# Patient Record
Sex: FEMALE | Race: WHITE | NOT HISPANIC OR LATINO | URBAN - METROPOLITAN AREA
[De-identification: names, ages, dates, MRNs, and addresses within clinical notes are randomized per-mention and may not be internally consistent; named-entity substitution may affect disease eponyms.]

---

## 2018-01-29 PROBLEM — Z00.00 ENCOUNTER FOR PREVENTIVE HEALTH EXAMINATION: Status: ACTIVE | Noted: 2018-01-29

## 2019-09-27 ENCOUNTER — INPATIENT (INPATIENT)
Facility: HOSPITAL | Age: 69
LOS: 3 days | Discharge: HOME | DRG: 71 | End: 2019-10-01
Attending: PSYCHIATRY & NEUROLOGY | Admitting: PSYCHIATRY & NEUROLOGY
Payer: MEDICARE

## 2019-09-27 VITALS
HEART RATE: 71 BPM | SYSTOLIC BLOOD PRESSURE: 126 MMHG | RESPIRATION RATE: 19 BRPM | DIASTOLIC BLOOD PRESSURE: 78 MMHG | OXYGEN SATURATION: 96 %

## 2019-09-27 DIAGNOSIS — I63.9 CEREBRAL INFARCTION, UNSPECIFIED: ICD-10-CM

## 2019-09-27 LAB
ALBUMIN SERPL ELPH-MCNC: 3.8 G/DL — SIGNIFICANT CHANGE UP (ref 3.3–5)
ALP SERPL-CCNC: 41 U/L — SIGNIFICANT CHANGE UP (ref 40–120)
ALT FLD-CCNC: 16 U/L — SIGNIFICANT CHANGE UP (ref 10–45)
ANION GAP SERPL CALC-SCNC: 13 MMOL/L — SIGNIFICANT CHANGE UP (ref 5–17)
APTT BLD: 29.5 SEC — SIGNIFICANT CHANGE UP (ref 27.5–36.3)
AST SERPL-CCNC: 19 U/L — SIGNIFICANT CHANGE UP (ref 10–40)
BASOPHILS # BLD AUTO: 0 K/UL — SIGNIFICANT CHANGE UP (ref 0–0.2)
BASOPHILS NFR BLD AUTO: 0.2 % — SIGNIFICANT CHANGE UP (ref 0–2)
BILIRUB SERPL-MCNC: 0.4 MG/DL — SIGNIFICANT CHANGE UP (ref 0.2–1.2)
BUN SERPL-MCNC: 20 MG/DL — SIGNIFICANT CHANGE UP (ref 7–23)
CALCIUM SERPL-MCNC: 10.5 MG/DL — SIGNIFICANT CHANGE UP (ref 8.4–10.5)
CHLORIDE SERPL-SCNC: 102 MMOL/L — SIGNIFICANT CHANGE UP (ref 96–108)
CO2 SERPL-SCNC: 23 MMOL/L — SIGNIFICANT CHANGE UP (ref 22–31)
CREAT SERPL-MCNC: 0.74 MG/DL — SIGNIFICANT CHANGE UP (ref 0.5–1.3)
EOSINOPHIL # BLD AUTO: 0.3 K/UL — SIGNIFICANT CHANGE UP (ref 0–0.5)
EOSINOPHIL NFR BLD AUTO: 3.6 % — SIGNIFICANT CHANGE UP (ref 0–6)
GLUCOSE SERPL-MCNC: 120 MG/DL — HIGH (ref 70–99)
HCT VFR BLD CALC: 41.4 % — SIGNIFICANT CHANGE UP (ref 34.5–45)
HGB BLD-MCNC: 13.3 G/DL — SIGNIFICANT CHANGE UP (ref 11.5–15.5)
INR BLD: 1.03 RATIO — SIGNIFICANT CHANGE UP (ref 0.88–1.16)
LYMPHOCYTES # BLD AUTO: 2.2 K/UL — SIGNIFICANT CHANGE UP (ref 1–3.3)
LYMPHOCYTES # BLD AUTO: 28.3 % — SIGNIFICANT CHANGE UP (ref 13–44)
MCHC RBC-ENTMCNC: 32.2 GM/DL — SIGNIFICANT CHANGE UP (ref 32–36)
MCHC RBC-ENTMCNC: 32.9 PG — SIGNIFICANT CHANGE UP (ref 27–34)
MCV RBC AUTO: 102 FL — HIGH (ref 80–100)
MONOCYTES # BLD AUTO: 1.1 K/UL — HIGH (ref 0–0.9)
MONOCYTES NFR BLD AUTO: 14.8 % — HIGH (ref 2–14)
NEUTROPHILS # BLD AUTO: 4 K/UL — SIGNIFICANT CHANGE UP (ref 1.8–7.4)
NEUTROPHILS NFR BLD AUTO: 53 % — SIGNIFICANT CHANGE UP (ref 43–77)
PLATELET # BLD AUTO: 225 K/UL — SIGNIFICANT CHANGE UP (ref 150–400)
POTASSIUM SERPL-MCNC: 4.7 MMOL/L — SIGNIFICANT CHANGE UP (ref 3.5–5.3)
POTASSIUM SERPL-SCNC: 4.7 MMOL/L — SIGNIFICANT CHANGE UP (ref 3.5–5.3)
PROT SERPL-MCNC: 6.9 G/DL — SIGNIFICANT CHANGE UP (ref 6–8.3)
PROTHROM AB SERPL-ACNC: 11.8 SEC — SIGNIFICANT CHANGE UP (ref 10–12.9)
RBC # BLD: 4.05 M/UL — SIGNIFICANT CHANGE UP (ref 3.8–5.2)
RBC # FLD: 11.9 % — SIGNIFICANT CHANGE UP (ref 10.3–14.5)
SODIUM SERPL-SCNC: 138 MMOL/L — SIGNIFICANT CHANGE UP (ref 135–145)
WBC # BLD: 7.6 K/UL — SIGNIFICANT CHANGE UP (ref 3.8–10.5)
WBC # FLD AUTO: 7.6 K/UL — SIGNIFICANT CHANGE UP (ref 3.8–10.5)

## 2019-09-27 PROCEDURE — 70498 CT ANGIOGRAPHY NECK: CPT | Mod: 26

## 2019-09-27 PROCEDURE — 99291 CRITICAL CARE FIRST HOUR: CPT

## 2019-09-27 PROCEDURE — 70496 CT ANGIOGRAPHY HEAD: CPT | Mod: 26

## 2019-09-27 PROCEDURE — 93010 ELECTROCARDIOGRAM REPORT: CPT

## 2019-09-27 RX ORDER — ZOLPIDEM TARTRATE 10 MG/1
5 TABLET ORAL AT BEDTIME
Refills: 0 | Status: DISCONTINUED | OUTPATIENT
Start: 2019-09-27 | End: 2019-10-01

## 2019-09-27 RX ORDER — ASPIRIN/CALCIUM CARB/MAGNESIUM 324 MG
300 TABLET ORAL DAILY
Refills: 0 | Status: DISCONTINUED | OUTPATIENT
Start: 2019-09-27 | End: 2019-09-27

## 2019-09-27 RX ORDER — DEXTROSE 50 % IN WATER 50 %
15 SYRINGE (ML) INTRAVENOUS ONCE
Refills: 0 | Status: DISCONTINUED | OUTPATIENT
Start: 2019-09-27 | End: 2019-10-01

## 2019-09-27 RX ORDER — RISPERIDONE 4 MG/1
0.25 TABLET ORAL ONCE
Refills: 0 | Status: COMPLETED | OUTPATIENT
Start: 2019-09-27 | End: 2019-09-27

## 2019-09-27 RX ORDER — FLUTICASONE PROPIONATE 50 MCG
1 SPRAY, SUSPENSION NASAL ONCE
Refills: 0 | Status: DISCONTINUED | OUTPATIENT
Start: 2019-09-27 | End: 2019-09-27

## 2019-09-27 RX ORDER — ATORVASTATIN CALCIUM 80 MG/1
80 TABLET, FILM COATED ORAL AT BEDTIME
Refills: 0 | Status: DISCONTINUED | OUTPATIENT
Start: 2019-09-27 | End: 2019-10-01

## 2019-09-27 RX ORDER — SIMVASTATIN 20 MG/1
40 TABLET, FILM COATED ORAL AT BEDTIME
Refills: 0 | Status: DISCONTINUED | OUTPATIENT
Start: 2019-09-27 | End: 2019-09-30

## 2019-09-27 RX ORDER — ENOXAPARIN SODIUM 100 MG/ML
40 INJECTION SUBCUTANEOUS ONCE
Refills: 0 | Status: COMPLETED | OUTPATIENT
Start: 2019-09-27 | End: 2019-09-27

## 2019-09-27 RX ORDER — CLOPIDOGREL BISULFATE 75 MG/1
75 TABLET, FILM COATED ORAL ONCE
Refills: 0 | Status: COMPLETED | OUTPATIENT
Start: 2019-09-27 | End: 2019-09-27

## 2019-09-27 RX ORDER — DOCUSATE SODIUM 100 MG
100 CAPSULE ORAL DAILY
Refills: 0 | Status: DISCONTINUED | OUTPATIENT
Start: 2019-09-27 | End: 2019-10-01

## 2019-09-27 RX ORDER — TRAZODONE HCL 50 MG
2 TABLET ORAL
Qty: 0 | Refills: 0 | DISCHARGE

## 2019-09-27 RX ORDER — ASPIRIN/CALCIUM CARB/MAGNESIUM 324 MG
81 TABLET ORAL DAILY
Refills: 0 | Status: DISCONTINUED | OUTPATIENT
Start: 2019-09-27 | End: 2019-09-27

## 2019-09-27 RX ORDER — SENNA PLUS 8.6 MG/1
1 TABLET ORAL
Qty: 0 | Refills: 0 | DISCHARGE

## 2019-09-27 RX ORDER — ASPIRIN/CALCIUM CARB/MAGNESIUM 324 MG
81 TABLET ORAL DAILY
Refills: 0 | Status: DISCONTINUED | OUTPATIENT
Start: 2019-09-27 | End: 2019-10-01

## 2019-09-27 RX ORDER — TRAZODONE HCL 50 MG
100 TABLET ORAL ONCE
Refills: 0 | Status: COMPLETED | OUTPATIENT
Start: 2019-09-27 | End: 2019-09-27

## 2019-09-27 RX ORDER — GLUCAGON INJECTION, SOLUTION 0.5 MG/.1ML
1 INJECTION, SOLUTION SUBCUTANEOUS ONCE
Refills: 0 | Status: DISCONTINUED | OUTPATIENT
Start: 2019-09-27 | End: 2019-10-01

## 2019-09-27 RX ORDER — DEXTROSE 50 % IN WATER 50 %
12.5 SYRINGE (ML) INTRAVENOUS ONCE
Refills: 0 | Status: DISCONTINUED | OUTPATIENT
Start: 2019-09-27 | End: 2019-10-01

## 2019-09-27 RX ORDER — FLUTICASONE PROPIONATE 50 MCG
1 SPRAY, SUSPENSION NASAL ONCE
Refills: 0 | Status: COMPLETED | OUTPATIENT
Start: 2019-09-27 | End: 2019-09-27

## 2019-09-27 RX ORDER — SENNA PLUS 8.6 MG/1
1 TABLET ORAL ONCE
Refills: 0 | Status: COMPLETED | OUTPATIENT
Start: 2019-09-27 | End: 2019-09-27

## 2019-09-27 RX ORDER — DIVALPROEX SODIUM 500 MG/1
125 TABLET, DELAYED RELEASE ORAL ONCE
Refills: 0 | Status: COMPLETED | OUTPATIENT
Start: 2019-09-27 | End: 2019-09-27

## 2019-09-27 RX ORDER — DOCUSATE SODIUM 100 MG
100 CAPSULE ORAL DAILY
Refills: 0 | Status: DISCONTINUED | OUTPATIENT
Start: 2019-09-27 | End: 2019-09-27

## 2019-09-27 RX ORDER — SODIUM CHLORIDE 9 MG/ML
1000 INJECTION, SOLUTION INTRAVENOUS
Refills: 0 | Status: DISCONTINUED | OUTPATIENT
Start: 2019-09-27 | End: 2019-10-01

## 2019-09-27 RX ADMIN — Medication 0.5 MILLIGRAM(S): at 22:32

## 2019-09-27 RX ADMIN — CLOPIDOGREL BISULFATE 75 MILLIGRAM(S): 75 TABLET, FILM COATED ORAL at 22:33

## 2019-09-27 RX ADMIN — DIVALPROEX SODIUM 125 MILLIGRAM(S): 500 TABLET, DELAYED RELEASE ORAL at 22:37

## 2019-09-27 RX ADMIN — ATORVASTATIN CALCIUM 80 MILLIGRAM(S): 80 TABLET, FILM COATED ORAL at 22:47

## 2019-09-27 RX ADMIN — RISPERIDONE 0.25 MILLIGRAM(S): 4 TABLET ORAL at 22:37

## 2019-09-27 RX ADMIN — Medication 100 MILLIGRAM(S): at 22:37

## 2019-09-27 RX ADMIN — SIMVASTATIN 40 MILLIGRAM(S): 20 TABLET, FILM COATED ORAL at 22:33

## 2019-09-27 RX ADMIN — Medication 81 MILLIGRAM(S): at 22:47

## 2019-09-27 RX ADMIN — ZOLPIDEM TARTRATE 5 MILLIGRAM(S): 10 TABLET ORAL at 22:34

## 2019-09-27 RX ADMIN — ENOXAPARIN SODIUM 40 MILLIGRAM(S): 100 INJECTION SUBCUTANEOUS at 22:34

## 2019-09-27 NOTE — ED ADULT TRIAGE NOTE - CHIEF COMPLAINT QUOTE
rt sided weakness, slurred speech last seen normal from 1230pm. Pt transferred from [facility] rt sided weakness, slurred speech last seen normal @ 1230pm. Pt transferred from [facility]

## 2019-09-27 NOTE — ED ADULT NURSE NOTE - CHPI ED NUR SYMPTOMS NEG
no vomiting/no loss of consciousness/no nausea/no numbness/no blurred vision/no dizziness/no fever/no confusion/no change in level of consciousness

## 2019-09-27 NOTE — ED ADULT NURSE NOTE - NSIMPLEMENTINTERV_GEN_ALL_ED
Implemented All Fall Risk Interventions:  [place] to call system. Call bell, personal items and telephone within reach. Instruct patient to call for assistance. Room bathroom lighting operational. Non-slip footwear when patient is off stretcher. Physically safe environment: no spills, clutter or unnecessary equipment. Stretcher in lowest position, wheels locked, appropriate side rails in place. Provide visual cue, wrist band, yellow gown, etc. Monitor gait and stability. Monitor for mental status changes and reorient to person, place, and time. Review medications for side effects contributing to fall risk. Reinforce activity limits and safety measures with patient and family.

## 2019-09-27 NOTE — ED PROVIDER NOTE - PROGRESS NOTE DETAILS
Elizabeth Goldberger PGY-3: neuro called that disks from OSH only w non-con CTH so request another CTA head and neck

## 2019-09-27 NOTE — H&P ADULT - HISTORY OF PRESENT ILLNESS
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 hemiparesis, HTN, HLD, Alcoholism c/b Korsakoff's Dementia, presents with complaints of R. hemiplegia. Patient was noted to have hemiplegia of RUE RLE today at 12pm, LKN. At that time, CTH showed NAD, CTA shows L. M1 stenosis unchanged from prior exam. Patient transfered to ICU before being transfered to [facility] for higher level of stroke management.   At [facility] ED, patient presents with R. nasolabial flattening only, NIHSS 1. VSS, labs grossly WNL.   Patient currently denies fevers, chills, ns, cp, sob, abd pain, n/v/d/c, or changes to weight or senses.

## 2019-09-27 NOTE — ED ADULT NURSE NOTE - CHIEF COMPLAINT QUOTE
rt sided weakness, slurred speech last seen normal @ 1230pm. Pt transferred from [facility]

## 2019-09-27 NOTE — ED PROVIDER NOTE - CRITICAL CARE PROVIDED
conducted a detailed discussion of DNR status/interpretation of diagnostic studies/consultation with other physicians/documentation/additional history taking/direct patient care (not related to procedure)

## 2019-09-27 NOTE — ED PROVIDER NOTE - OBJECTIVE STATEMENT
69f w hx HTN, HLD, ETOH abuse, Korsakoff's, L MCA stroke on 9/17 treated w tPA w improved sx and only slight residual r-sided weakness, xfer'd today from [facility] for recurrence of severe right-sided weakness and slurred speech. Pt was admitted to [facility], in MICU; no intervention given for these new sx. Decision was made to xfer to [facility] for higher level neuro. She is currently AOx2 (does not know year). Feels her speech is still abnormal but significantly improved from earlier in addition to weakness on right. She denies h/a, visual changes or other complaints.

## 2019-09-27 NOTE — ED PROVIDER NOTE - CLINICAL SUMMARY MEDICAL DECISION MAKING FREE TEXT BOX
69f w hx HTN, HLD, ETOH abuse, Korsakoff's, L MCA stroke on 9/17 treated w tPA w improved sx and only slight residual r-sided weakness, xfer'd today from [facility] for recurrence of severe right-sided weakness and slurred speech. Pt appears well NAD. Exam w slight RUE weakness and slurred speech, otherwise nonfocal. Will recheck labs, d/w neuro, likely admit

## 2019-09-27 NOTE — ED PROVIDER NOTE - ATTENDING CONTRIBUTION TO CARE
69 y F hx of HTN, HLD, alcohol dependence, Korsakoff's, CVA on 9/17 transfer for stroke like sx earlier today. Per paperwork from outside hospital, pt presented with stroke like sx on 9/17 which included R hemiplegia, received tPA and was DC to rehab 9/23 w 4/5 weakness throughout, no other deficit. Then today around 1230 noted to have flaccid paralysis RUE and RLE and R facial droop. Was called as code stroke and taken to CT where her sx began to resolve. Pt tnr to ICU and decsion to TNR to [facility] for more advanced stroke care. Code stroke activate don pt arrival. Pt with no discernable focal weakness. A&O. Speech clear and fluent. Mild loss of R nasolabial fold. NIH1. Pt arrived with disc w CT/CTA hH/N from today which showed no change from prior imaging, does have M1/2 stenosis. Neuro states not tPA candidate at present, no need for further imaging currently. Continue to monitor for neuro changes. BP monitoring, Dispo TBD by neuro.

## 2019-09-27 NOTE — ED PROVIDER NOTE - NEUROLOGICAL MOTOR DORSIFLEXION RIGHT
r  strength 4/5 relative to 5/5 on left, full strength LEs r  strength 4/5 relative to 5/5 on left, however no pronoar drift BL  full strength LEs

## 2019-09-27 NOTE — ED ADULT NURSE NOTE - OBJECTIVE STATEMENT
69F comes to ED transfer from [facility] for stroke eval. Patient had CVA (left MCA) on 9/17 for which she received TPA. She went to rehab and has residual right sided weakness. Today at rehab, she had acute episode of worsening right sided weakness with aphasia. She did not get TPA at [facility]. Patient is a&ox2 - disoriented to time. States "I felt that my weakness got worse". Code stroke was called for patient but neurology MD states he does not need further imaging, and patient is now outside window for TPA. Patient alert, in no distress. Denies SOB/chest pain/N/V/D/dizziness/abdominal pain/fever/chills/numbness/tingling. On exam, right facial droop noted, no drift in any extremities, all 4 extremities strong, abdomen soft, sensation intact all 4 extremities. To remain on cardiac monitor. Bedrails up for safety .

## 2019-09-27 NOTE — H&P ADULT - ATTENDING COMMENTS
I agree with the above stated with  the following modifications:  Patient seen and examined today no focal deficit on examination.  CT of the head done at [facility] reviewed by me and no major area of hypodensity identified other that bilateral basal ganglia and left insular chronic ischemic changes. Changes of small vessel disease along with age related atrophy evident. Head and neck angiography pertinent for multiple intracranial atherosclerosis. Left M2 superior division area of stenosis noticed.    Impression:  Symptomatic intracranial atherosclerosis in the form of hypoperfusion vs artery to artery embolism.     Plan:  1. Unclear if patient on ASA and Plavix?? Needs ASA and Plavix for at least 3 months then discontinue Plavix and keep ASA. If she becomes again symptomatic considerations for intracranial stent can be discuss.  2. PRU upon 48 hrs of Plavix therapy  3. Lipid panel LDL 82 continue home dose statin  4. HgA1C 6.3 keep tight glycemic control  5. Pharmacological DVT prophylaxis  6. Permissive HTN systolic up to 220 then after 48-72 hrs gradual normotension with cautious observation for recurrence of symptoms  7. No need for further imaging   8. Dysphagia passed  9. PT/OT evaluation  10. Anticipated prompt discharge

## 2019-09-27 NOTE — CONSULT NOTE ADULT - SUBJECTIVE AND OBJECTIVE BOX
Neurology  Consult Note  09-27-19    Name:  LEANNA BRAMBILA; 69y (1950)    Reason for Admission:     Chief Complaint:  R. 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 hemiparesis, HTN, HLD, Alcoholism c/b Korsakoff's Dementia, presents with complaints of R. hemiplegia. Patient was noted to have hemiplegia of RUE RLE today at 12pm, LKN. At that time, CTH showed NAD, CTA shows L. M1 stenosis unchanged from prior exam. Patient transfered to ICU before being transfered to [facility] for higher level of stroke management.   At [facility] ED, patient presents with R. nasolabial flattening only, NIHSS 1. VSS, labs grossly WNL.   Patient currently denies fevers, chills, ns, cp, sob, abd pain, n/v/d/c, or changes to weight or senses.     Review of Systems:  As states in HPI.    PMHx:   Alcohol dependence  Hyperlipemia  HTN (hypertension)  Depression    PSuHx:   Depression  Hyperlipemia  HTN (hypertension)    Medications:  MEDICATIONS  (STANDING):    MEDICATIONS  (PRN):    Vitals:  T(C): --  HR: 71 (09-27-19 @ 16:41) (71 - 71)  BP: 126/78 (09-27-19 @ 16:41) (126/78 - 126/78)  RR: 19 (09-27-19 @ 16:41) (19 - 19)  SpO2: 96% (09-27-19 @ 16:41) (96% - 96%)    Labs:                        13.3   7.6   )-----------( 225      ( 27 Sep 2019 17:01 )             41.4     09-27    138  |  102  |  20  ----------------------------<  120<H>  4.7   |  23  |  0.74    Ca    10.5      27 Sep 2019 17:01    TPro  6.9  /  Alb  3.8  /  TBili  0.4  /  DBili  x   /  AST  19  /  ALT  16  /  AlkPhos  41  09-27    CAPILLARY BLOOD GLUCOSE      POCT Blood Glucose.: 111 mg/dL (27 Sep 2019 16:40)    LIVER FUNCTIONS - ( 27 Sep 2019 17:01 )  Alb: 3.8 g/dL / Pro: 6.9 g/dL / ALK PHOS: 41 U/L / ALT: 16 U/L / AST: 19 U/L / GGT: x             PT/INR - ( 27 Sep 2019 17:01 )   PT: 11.8 sec;   INR: 1.03 ratio         PTT - ( 27 Sep 2019 17:01 )  PTT:29.5 sec    PHYSICAL EXAMINATION:  General: Well-developed, well nourished, in no acute distress.  Eyes: Conjunctiva and sclera clear.  Neurologic:  - Mental Status:  Alert, awake, oriented to person, place, and time; Speech is fluent with intact naming, repetition, and comprehension;   - Cranial Nerves II-XII:    II:  Visual fields are full to confrontation; Pupils are equal, round, and reactive to light;  III, IV, VI:  Extraocular movements are intact without nystagmus.  V:  Facial sensation is intact in the V1-V3 distribution bilaterally.  VII: R UMN lower facial palsy w/o correction on smile  VIII:  Hearing is intact to finger rub.  IX, X:  Uvula is midline and soft palate rises symmetrically  XI:  Head turning and shoulder shrug are intact.  XII:  Tongue protudes in the midline.  - Motor:  Strength is 5/5 throughout.  Bilateral tremulousness. There is no pronator drift.  Normal muscle bulk and tone throughout.  - Reflexes:  2+ and symmetric at the biceps, triceps, brachioradialis, knees, and ankles.  Plantar responses flexor.  - Sensory:  Intact throughout to vibration, and joint-position, light touch, pin prick.  - Coordination:  Finger-nose-finger and heel-knee-shin intact without dysmetria.  Rapid alternating hand movements intact.    Radiology:  CTA Pending

## 2019-09-27 NOTE — H&P ADULT - NSHPPHYSICALEXAM_GEN_ALL_CORE
PHYSICAL EXAMINATION:  General: Well-developed, well nourished, in no acute distress.  Eyes: Conjunctiva and sclera clear.  Neurologic:  - Mental Status:  Alert, awake, oriented to person, place, and time; Speech is fluent with intact naming, repetition, and comprehension;   - Cranial Nerves II-XII:    II:  Visual fields are full to confrontation; Pupils are equal, round, and reactive to light;  III, IV, VI:  Extraocular movements are intact without nystagmus.  V:  Facial sensation is intact in the V1-V3 distribution bilaterally.  VII: R UMN lower facial palsy w/o correction on smile  VIII:  Hearing is intact to finger rub.  IX, X:  Uvula is midline and soft palate rises symmetrically  XI:  Head turning and shoulder shrug are intact.  XII:  Tongue protrudes in the midline.  - Motor:  Strength is 5/5 throughout.  Bilateral tremulousness. There is no pronator drift.  Normal muscle bulk and tone throughout.  - Reflexes:  2+ and symmetric at the biceps, triceps, brachioradialis, knees, and ankles.  Plantar responses flexor.  - Sensory:  Intact throughout to vibration, and joint-position, light touch, pin prick.  - Coordination:  Finger-nose-finger and heel-knee-shin intact without dysmetria.  Rapid alternating hand movements intact.

## 2019-09-28 LAB
CHOLEST SERPL-MCNC: 164 MG/DL — SIGNIFICANT CHANGE UP (ref 10–199)
ESTIMATED AVERAGE GLUCOSE: 134 MG/DL — HIGH (ref 68–114)
GLUCOSE BLDC GLUCOMTR-MCNC: 116 MG/DL — HIGH (ref 70–99)
GLUCOSE BLDC GLUCOMTR-MCNC: 131 MG/DL — HIGH (ref 70–99)
GLUCOSE BLDC GLUCOMTR-MCNC: 133 MG/DL — HIGH (ref 70–99)
GLUCOSE BLDC GLUCOMTR-MCNC: 141 MG/DL — HIGH (ref 70–99)
HBA1C BLD-MCNC: 6.3 % — HIGH (ref 4–5.6)
HBA1C BLD-MCNC: 6.3 % — HIGH (ref 4–5.6)
HDLC SERPL-MCNC: 58 MG/DL — SIGNIFICANT CHANGE UP
LIPID PNL WITH DIRECT LDL SERPL: 82 MG/DL — SIGNIFICANT CHANGE UP
TOTAL CHOLESTEROL/HDL RATIO MEASUREMENT: 2.8 RATIO — LOW (ref 3.3–7.1)
TRIGL SERPL-MCNC: 119 MG/DL — SIGNIFICANT CHANGE UP (ref 10–149)

## 2019-09-28 PROCEDURE — 99223 1ST HOSP IP/OBS HIGH 75: CPT

## 2019-09-28 RX ORDER — CLOPIDOGREL BISULFATE 75 MG/1
75 TABLET, FILM COATED ORAL DAILY
Refills: 0 | Status: DISCONTINUED | OUTPATIENT
Start: 2019-09-28 | End: 2019-10-01

## 2019-09-28 RX ORDER — ENOXAPARIN SODIUM 100 MG/ML
40 INJECTION SUBCUTANEOUS DAILY
Refills: 0 | Status: DISCONTINUED | OUTPATIENT
Start: 2019-09-28 | End: 2019-10-01

## 2019-09-28 RX ORDER — INSULIN LISPRO 100/ML
VIAL (ML) SUBCUTANEOUS
Refills: 0 | Status: DISCONTINUED | OUTPATIENT
Start: 2019-09-28 | End: 2019-10-01

## 2019-09-28 RX ORDER — ACETAMINOPHEN 500 MG
650 TABLET ORAL EVERY 6 HOURS
Refills: 0 | Status: DISCONTINUED | OUTPATIENT
Start: 2019-09-28 | End: 2019-10-01

## 2019-09-28 RX ORDER — INSULIN LISPRO 100/ML
VIAL (ML) SUBCUTANEOUS AT BEDTIME
Refills: 0 | Status: DISCONTINUED | OUTPATIENT
Start: 2019-09-28 | End: 2019-10-01

## 2019-09-28 RX ADMIN — ZOLPIDEM TARTRATE 5 MILLIGRAM(S): 10 TABLET ORAL at 21:02

## 2019-09-28 RX ADMIN — SIMVASTATIN 40 MILLIGRAM(S): 20 TABLET, FILM COATED ORAL at 21:02

## 2019-09-28 RX ADMIN — Medication 650 MILLIGRAM(S): at 17:14

## 2019-09-28 RX ADMIN — CLOPIDOGREL BISULFATE 75 MILLIGRAM(S): 75 TABLET, FILM COATED ORAL at 17:17

## 2019-09-28 RX ADMIN — ENOXAPARIN SODIUM 40 MILLIGRAM(S): 100 INJECTION SUBCUTANEOUS at 12:59

## 2019-09-28 RX ADMIN — Medication 650 MILLIGRAM(S): at 16:45

## 2019-09-28 RX ADMIN — ATORVASTATIN CALCIUM 80 MILLIGRAM(S): 80 TABLET, FILM COATED ORAL at 21:02

## 2019-09-28 RX ADMIN — Medication 81 MILLIGRAM(S): at 13:00

## 2019-09-28 NOTE — OCCUPATIONAL THERAPY INITIAL EVALUATION ADULT - PERTINENT HX OF CURRENT PROBLEM, REHAB EVAL
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 Pt noted to have hemiplegia of RUE/RLE today at 12pm, LKN. At that time, CTH showed NAD, CTA shows L M1 stenosis unchanged from prior exam. Pt transferred to ICU before being transferred to [facility] for higher level of stroke mgmt. (cont below)

## 2019-09-28 NOTE — PHYSICAL THERAPY INITIAL EVALUATION ADULT - PRECAUTIONS/LIMITATIONS, REHAB EVAL
CTH 9/27: No acute ICH, mass effect, or shift of the midline structures. Small age indeterminate infarct within the left corona radiata extending to the L internal capsule. Additional chronic R frontal lobe infarct and chronic microvascular disease. CTA BRAIN 9/27: Multifocal areas of moderate to severe stenosis involving the R PCA. Additional areas of stenoses involving the R CLIARE. Mild stenosis involving the origin of the L superior terminal branch of the L MCA at the MCA bifurcation. CTA NECK 9/27: No focal stenosis, major vessel occlusion, aneurysm or dissection of the bilateral vertebral, common carotid, & ICAs. Ascending aortic aneurysm measuring up to 4.7cm./fall precautions

## 2019-09-28 NOTE — PHYSICAL THERAPY INITIAL EVALUATION ADULT - PERTINENT HX OF CURRENT PROBLEM, REHAB EVAL
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 presents with c/o R hemiplegia. Pt noted to have hemiplegia of RUE/RLE today at 12pm, LKN, CTH: NAD, CTA shows L M1 stenosis unchanged from prior exam. Pt transfered to ICU & then to [facility]. At [facility] ED, NIHSS 1. +Recrudescence due to remote L MCA territory infarct. CTA demonstrates intracranial stenosis, but no intervention recs.

## 2019-09-28 NOTE — SPEECH LANGUAGE PATHOLOGY EVALUATION - SLP PERTINENT HISTORY OF CURRENT PROBLEM
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 hemiparesis, HTN, HLD, Alcoholism c/b Korsakoff's Dementia, presents with complaints of R. hemiplegia. Patient was noted to have hemiplegia of RUE RLE today at 12pm, LKN. At that time, CTH showed NAD, CTA shows L. M1 stenosis unchanged from prior exam. Patient transfered to ICU before being transfered to [facility] for higher level of stroke management.

## 2019-09-28 NOTE — SPEECH LANGUAGE PATHOLOGY EVALUATION - SLP DIAGNOSIS
Pt presents with evidence of a mild-mod dysarthria.  Speech marked by hoarse, raspy vocal quality, imprecise articulatory precision with reduced respiratory support with an average of 4-6 words per breath group. Overall fair speech intelligibility. Pt became somewhat resistant to evaluation half-way through and stated "I just don't want to do this anymore" accompanied by turning over in bed. Suspect higher level cognitive deficits present marked by reduced insight into deficits. Ongoing assessment suggested to evaluate cognitive skills.

## 2019-09-28 NOTE — SPEECH LANGUAGE PATHOLOGY EVALUATION - COMMENTS
Reason for Admission: L. MCA infarct recrudescence.  CTA Head 9/27: CTA BRAIN:Multifocal areas of moderate to severe stenosis involving the right   posterior cerebral artery. Additional areas of stenoses involving the right anterior cerebral   artery. Please see discussion the body of the report.Mild stenosis involving the origin of the left superior terminal branch of the left MCA at the MCA bifurcation.  CTA NECK:No focal stenosis, major vessel occlusion, aneurysm or dissection of the bilateral vertebral, common carotid, and internal carotid arteries.Ascending aortic aneurysm measuring up to 4.7 cm. t presents with evidence of a mild-mod dysarthria.  Speech marked by hoarse, raspy vocal quality, imprecise articulatory precision with reduced respiratory support with an average of 4-6 words per breath group. Overall fair speech intelligibility. OT for cognition Pt refused to participate Unable to fully assess cognition due to refusal to participate. Pt noted with poor pragmatic skills, evidenced by turning over in bed and stating "we're done - I don't want to do this" while SLP was talking. Pt refused

## 2019-09-28 NOTE — SPEECH LANGUAGE PATHOLOGY EVALUATION - SLP GENERAL OBSERVATIONS
Pt encountered bedside, AA&Ox4. Pt able to follow simple commands and answer yes/no questions. Pt reported "I just had a great lunch of tuna fish, cheese and a roll"

## 2019-09-28 NOTE — OCCUPATIONAL THERAPY INITIAL EVALUATION ADULT - PLANNED THERAPY INTERVENTIONS, OT EVAL
balance training/cognitive, visual perceptual/neuromuscular re-education/ADL retraining/transfer training/fine motor coordination training/parent/caregiver training.../strengthening Full Thickness Lip Wedge Repair (Flap) Text: Given the location of the defect and the proximity to free margins a full thickness wedge repair was deemed most appropriate.  Using a sterile surgical marker, the appropriate repair was drawn incorporating the defect and placing the expected incisions perpendicular to the vermilion border.  The vermilion border was also meticulously outlined to ensure appropriate reapproximation during the repair.  The area thus outlined was incised through and through with a #15 scalpel blade.  The muscularis and dermis were reaproximated with deep sutures following hemostasis. Care was taken to realign the vermilion border before proceeding with the superficial closure.  Once the vermilion was realigned the superfical and mucosal closure was finished.

## 2019-09-28 NOTE — OCCUPATIONAL THERAPY INITIAL EVALUATION ADULT - LEVEL OF INDEPENDENCE, REHAB EVAL
Pt being discharged at this time. Discharge instructions reviewed with mom. Reviewed to call MD regarding pts status. Reviewed to monitor for signs and symptoms of infection as well as intake and output. Reviewed how to care for gastrostomy as well as administering medications and feedings. Mom comfortable with gastrostomy and demonstrated understanding. Reviewed medication list, dose, route and frequency. Follow-up appointment scheduled. All questions answered, mom verbalized understanding. Awaiting carepoint delivery at this time. Safety maintained.    NT-patient received in chair

## 2019-09-28 NOTE — PHYSICAL THERAPY INITIAL EVALUATION ADULT - ADDITIONAL COMMENTS
lives in assisted living facility, no stairs, pt ambulated independent prior to last hospital admission (~1wk ago) and at the rehab center was using wheelchair. patient right hand dominant

## 2019-09-28 NOTE — OCCUPATIONAL THERAPY INITIAL EVALUATION ADULT - ADL RETRAINING, OT EVAL
Goals: Pt will perform LB dressing with supervision within 2 weeks. Pt will perform toileting with supervision within 2 weeks.

## 2019-09-28 NOTE — PHYSICAL THERAPY INITIAL EVALUATION ADULT - IMPAIRMENTS CONTRIBUTING TO GAIT DEVIATIONS, PT EVAL
decreased strength/required assistance with maneuvering rolling walker especially during turns/impaired balance

## 2019-09-28 NOTE — SPEECH LANGUAGE PATHOLOGY EVALUATION - YES/NO QUESTIONS: COMPLEX
no/Patient requested to terminate paragraph length comprehension questions and stated "I just really don't want to do this"

## 2019-09-28 NOTE — OCCUPATIONAL THERAPY INITIAL EVALUATION ADULT - ADDITIONAL COMMENTS
CTA Head 9/27: CTA BRAIN:Multifocal areas of moderate to severe stenosis involving the right   posterior cerebral artery. Additional areas of stenoses involving the right anterior cerebral   artery. Please see discussion the body of the report.Mild stenosis involving the origin of the left superior terminal branch of the left MCA at the MCA bifurcation.  CTA NECK:No focal stenosis, major vessel occlusion, aneurysm or dissection of the bilateral vertebral, common carotid, and internal carotid arteries.Ascending aortic aneurysm measuring up to 4.7 cm.

## 2019-09-29 LAB
ANION GAP SERPL CALC-SCNC: 12 MMOL/L — SIGNIFICANT CHANGE UP (ref 5–17)
BUN SERPL-MCNC: 14 MG/DL — SIGNIFICANT CHANGE UP (ref 7–23)
CALCIUM SERPL-MCNC: 10 MG/DL — SIGNIFICANT CHANGE UP (ref 8.4–10.5)
CHLORIDE SERPL-SCNC: 103 MMOL/L — SIGNIFICANT CHANGE UP (ref 96–108)
CO2 SERPL-SCNC: 24 MMOL/L — SIGNIFICANT CHANGE UP (ref 22–31)
CREAT SERPL-MCNC: 0.6 MG/DL — SIGNIFICANT CHANGE UP (ref 0.5–1.3)
GLUCOSE BLDC GLUCOMTR-MCNC: 138 MG/DL — HIGH (ref 70–99)
GLUCOSE BLDC GLUCOMTR-MCNC: 144 MG/DL — HIGH (ref 70–99)
GLUCOSE BLDC GLUCOMTR-MCNC: 150 MG/DL — HIGH (ref 70–99)
GLUCOSE BLDC GLUCOMTR-MCNC: 174 MG/DL — HIGH (ref 70–99)
GLUCOSE SERPL-MCNC: 154 MG/DL — HIGH (ref 70–99)
HBA1C BLD-MCNC: 6.3 % — HIGH (ref 4–5.6)
HCT VFR BLD CALC: 42.4 % — SIGNIFICANT CHANGE UP (ref 34.5–45)
HGB BLD-MCNC: 13.8 G/DL — SIGNIFICANT CHANGE UP (ref 11.5–15.5)
MCHC RBC-ENTMCNC: 32.5 GM/DL — SIGNIFICANT CHANGE UP (ref 32–36)
MCHC RBC-ENTMCNC: 32.9 PG — SIGNIFICANT CHANGE UP (ref 27–34)
MCV RBC AUTO: 101.2 FL — HIGH (ref 80–100)
PA ADP PRP-ACNC: 103 PRU — LOW (ref 194–417)
PLATELET # BLD AUTO: 217 K/UL — SIGNIFICANT CHANGE UP (ref 150–400)
POTASSIUM SERPL-MCNC: 4.2 MMOL/L — SIGNIFICANT CHANGE UP (ref 3.5–5.3)
POTASSIUM SERPL-SCNC: 4.2 MMOL/L — SIGNIFICANT CHANGE UP (ref 3.5–5.3)
RBC # BLD: 4.19 M/UL — SIGNIFICANT CHANGE UP (ref 3.8–5.2)
RBC # FLD: 12.5 % — SIGNIFICANT CHANGE UP (ref 10.3–14.5)
SODIUM SERPL-SCNC: 139 MMOL/L — SIGNIFICANT CHANGE UP (ref 135–145)
WBC # BLD: 6.99 K/UL — SIGNIFICANT CHANGE UP (ref 3.8–10.5)
WBC # FLD AUTO: 6.99 K/UL — SIGNIFICANT CHANGE UP (ref 3.8–10.5)

## 2019-09-29 PROCEDURE — 99233 SBSQ HOSP IP/OBS HIGH 50: CPT

## 2019-09-29 RX ORDER — LISINOPRIL 2.5 MG/1
20 TABLET ORAL DAILY
Refills: 0 | Status: DISCONTINUED | OUTPATIENT
Start: 2019-09-29 | End: 2019-09-30

## 2019-09-29 RX ORDER — TRAZODONE HCL 50 MG
100 TABLET ORAL AT BEDTIME
Refills: 0 | Status: DISCONTINUED | OUTPATIENT
Start: 2019-09-29 | End: 2019-10-01

## 2019-09-29 RX ORDER — RISPERIDONE 4 MG/1
0.25 TABLET ORAL DAILY
Refills: 0 | Status: DISCONTINUED | OUTPATIENT
Start: 2019-09-29 | End: 2019-10-01

## 2019-09-29 RX ORDER — DIVALPROEX SODIUM 500 MG/1
500 TABLET, DELAYED RELEASE ORAL
Refills: 0 | Status: DISCONTINUED | OUTPATIENT
Start: 2019-09-29 | End: 2019-10-01

## 2019-09-29 RX ADMIN — ENOXAPARIN SODIUM 40 MILLIGRAM(S): 100 INJECTION SUBCUTANEOUS at 12:49

## 2019-09-29 RX ADMIN — Medication 0.5 MILLIGRAM(S): at 18:40

## 2019-09-29 RX ADMIN — Medication 1: at 12:50

## 2019-09-29 RX ADMIN — LISINOPRIL 20 MILLIGRAM(S): 2.5 TABLET ORAL at 12:49

## 2019-09-29 RX ADMIN — RISPERIDONE 0.25 MILLIGRAM(S): 4 TABLET ORAL at 18:41

## 2019-09-29 RX ADMIN — DIVALPROEX SODIUM 500 MILLIGRAM(S): 500 TABLET, DELAYED RELEASE ORAL at 18:40

## 2019-09-29 RX ADMIN — Medication 100 MILLIGRAM(S): at 21:26

## 2019-09-29 RX ADMIN — Medication 81 MILLIGRAM(S): at 12:49

## 2019-09-29 RX ADMIN — Medication 100 MILLIGRAM(S): at 12:49

## 2019-09-29 RX ADMIN — SIMVASTATIN 40 MILLIGRAM(S): 20 TABLET, FILM COATED ORAL at 21:26

## 2019-09-29 RX ADMIN — ZOLPIDEM TARTRATE 5 MILLIGRAM(S): 10 TABLET ORAL at 21:26

## 2019-09-29 RX ADMIN — CLOPIDOGREL BISULFATE 75 MILLIGRAM(S): 75 TABLET, FILM COATED ORAL at 12:49

## 2019-09-29 RX ADMIN — ATORVASTATIN CALCIUM 80 MILLIGRAM(S): 80 TABLET, FILM COATED ORAL at 21:26

## 2019-09-29 NOTE — PROGRESS NOTE ADULT - SUBJECTIVE AND OBJECTIVE BOX
THE PATIENT WAS SEEN AND EXAMINED BY ME WITH THE HOUSESTAFF AND STROKE TEAM DURING MORNING ROUNDS.   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 hemiparesis, HTN, HLD, Alcoholism c/b Korsakoff's Dementia, presented to the ED with complaints of R. hemiplegia. Patient was noted to have hemiplegia of RUE RLE on the day of admission at 12pm, LKN. At that time, CTH showed NAD, CTA shows L. M1 stenosis unchanged from prior exam. Patient transferred to ICU before being transferred to [facility] for higher level of stroke management. At [facility] ED, patient presents with R. nasolabial flattening only, NIHSS 1. VSS, labs grossly WNL. Patient currently denies fevers, chills, ns, cp, sob, abd pain, n/v/d/c, or changes to weight or senses.    SUBJECTIVE: No events overnight. No new neurologic complaints.     acetaminophen   Tablet .. 650 milliGRAM(s) Oral every 6 hours PRN  aspirin  chewable 81 milliGRAM(s) Oral daily  atorvastatin 80 milliGRAM(s) Oral at bedtime  clopidogrel Tablet 75 milliGRAM(s) Oral daily  dextrose 40% Gel 15 Gram(s) Oral once PRN  dextrose 5%. 1000 milliLiter(s) IV Continuous <Continuous>  dextrose 50% Injectable 12.5 Gram(s) IV Push once  docusate sodium 100 milliGRAM(s) Oral daily  enoxaparin Injectable 40 milliGRAM(s) SubCutaneous daily  glucagon  Injectable 1 milliGRAM(s) IntraMuscular once PRN  insulin lispro (HumaLOG) corrective regimen sliding scale   SubCutaneous three times a day before meals  insulin lispro (HumaLOG) corrective regimen sliding scale   SubCutaneous at bedtime  simvastatin 40 milliGRAM(s) Oral at bedtime  zolpidem 5 milliGRAM(s) Oral at bedtime PRN  zolpidem 5 milliGRAM(s) Oral at bedtime PRN    PHYSICAL EXAM:   Vital Signs Last 24 Hrs  T(C): 36.7 (29 Sep 2019 04:45), Max: 36.7 (28 Sep 2019 08:39)  T(F): 98 (29 Sep 2019 04:45), Max: 98.1 (28 Sep 2019 08:39)  HR: 80 (29 Sep 2019 04:45) (71 - 94)  BP: 160/81 (29 Sep 2019 04:45) (140/72 - 174/78)  RR: 19 (29 Sep 2019 04:45) (19 - 20)  SpO2: 97% (29 Sep 2019 04:45) (94% - 97%)    General: No acute distress  HEENT: EOM intact  Abdomen: Soft, nontender, nondistended   Extremities: No edema    NEUROLOGICAL EXAM:  Mental status: Awake, alert, oriented to person, place, and time, speech is fluent with intact naming and repetition  Cranial Nerves: R UMN lower facial palsy w/o correction on smily, no nystagmus, no dysarthria, tongue midline  Motor exam: Normal tone, strength 5/5 throughout   Sensation: Intact to light touch   Coordination/ Gait: No dysmetria, gait deferred     LABS:                        13.3   7.6   )-----------( 225      ( 27 Sep 2019 17:01 )             41.4    09-29    139  |  103  |  14  ----------------------------<  154<H>  4.2   |  24  |  0.60    Ca    10.0      29 Sep 2019 06:07    TPro  6.9  /  Alb  3.8  /  TBili  0.4  /  DBili  x   /  AST  19  /  ALT  16  /  AlkPhos  41  09-27  PT/INR - ( 27 Sep 2019 17:01 )   PT: 11.8 sec;   INR: 1.03 ratio         PTT - ( 27 Sep 2019 17:01 )  PTT:29.5 sec  Hemoglobin A1C, Whole Blood: 6.3 % (09-28 @ 11:34)  Hemoglobin A1C, Whole Blood: 6.3 % (09-28 @ 11:10)      IMAGING: Reviewed by me.     CT head No Cont (9/27/19): No acute intracranial hemorrhage, mass effect, or shift of the midline structures.2. Small age indeterminate infarct within the left corona radiata extending to the left internal capsule.3. Additional chronic right frontal lobe infarct and chronic microvascular disease.    CTA BRAIN w/ IV Cont (9/27/19):Multifocal areas of moderate to severe stenosis involving the right posterior cerebral artery.2. Additional areas of stenoses involving the right anterior cerebral artery. Please see discussion the body of the report.3. Mild stenosis involving the origin of the left superior terminal branch of the left MCA at the MCA bifurcation.    CTA NECK w/ IV cont (9/27/19):No focal stenosis, major vessel occlusion, aneurysm or dissection of the bilateral vertebral, common carotid, and internal carotid arteries.2. Ascending aortic aneurysm measuring up to 4.7 cm. THE PATIENT WAS SEEN AND EXAMINED BY ME WITH THE HOUSESTAFF AND STROKE TEAM DURING MORNING ROUNDS.   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 hemiparesis, HTN, HLD, Alcoholism c/b Korsakoff's Dementia, presented to the ED with complaints of R. hemiplegia. Patient was noted to have hemiplegia of RUE RLE on the day of admission at 12pm, LKN. At that time, CTH showed NAD, CTA shows L. M1 stenosis unchanged from prior exam. Patient transferred to ICU before being transferred to [facility] for higher level of stroke management. At [facility] ED, patient presents with R. nasolabial flattening only, NIHSS 1. VSS, labs grossly WNL. Patient currently denies fevers, chills, ns, cp, sob, abd pain, n/v/d/c, or changes to weight or senses.    SUBJECTIVE: No events overnight. No new neurologic complaints.     acetaminophen   Tablet .. 650 milliGRAM(s) Oral every 6 hours PRN  aspirin  chewable 81 milliGRAM(s) Oral daily  atorvastatin 80 milliGRAM(s) Oral at bedtime  clopidogrel Tablet 75 milliGRAM(s) Oral daily  dextrose 40% Gel 15 Gram(s) Oral once PRN  dextrose 5%. 1000 milliLiter(s) IV Continuous <Continuous>  dextrose 50% Injectable 12.5 Gram(s) IV Push once  docusate sodium 100 milliGRAM(s) Oral daily  enoxaparin Injectable 40 milliGRAM(s) SubCutaneous daily  glucagon  Injectable 1 milliGRAM(s) IntraMuscular once PRN  insulin lispro (HumaLOG) corrective regimen sliding scale   SubCutaneous three times a day before meals  insulin lispro (HumaLOG) corrective regimen sliding scale   SubCutaneous at bedtime  simvastatin 40 milliGRAM(s) Oral at bedtime  zolpidem 5 milliGRAM(s) Oral at bedtime PRN  zolpidem 5 milliGRAM(s) Oral at bedtime PRN    PHYSICAL EXAM:   Vital Signs Last 24 Hrs  T(C): 36.7 (29 Sep 2019 04:45), Max: 36.7 (28 Sep 2019 08:39)  T(F): 98 (29 Sep 2019 04:45), Max: 98.1 (28 Sep 2019 08:39)  HR: 80 (29 Sep 2019 04:45) (71 - 94)  BP: 160/81 (29 Sep 2019 04:45) (140/72 - 174/78)  RR: 19 (29 Sep 2019 04:45) (19 - 20)  SpO2: 97% (29 Sep 2019 04:45) (94% - 97%)    General: No acute distress  HEENT: EOM intact  Abdomen: Soft, nontender, nondistended   Extremities: No edema    NEUROLOGICAL EXAM:  Mental status: Awake, alert, oriented to person, place, and time, speech is fluent with intact naming and repetition  Cranial Nerves: R UMN lower facial palsy w/o correction on smile, no nystagmus, no dysarthria, tongue midline  Motor exam: Normal tone, strength 5/5 throughout   Sensation: Intact to light touch   Coordination/ Gait: No dysmetria, gait deferred     LABS:                        13.3   7.6   )-----------( 225      ( 27 Sep 2019 17:01 )             41.4    09-29    139  |  103  |  14  ----------------------------<  154<H>  4.2   |  24  |  0.60    Ca    10.0      29 Sep 2019 06:07    TPro  6.9  /  Alb  3.8  /  TBili  0.4  /  DBili  x   /  AST  19  /  ALT  16  /  AlkPhos  41  09-27  PT/INR - ( 27 Sep 2019 17:01 )   PT: 11.8 sec;   INR: 1.03 ratio         PTT - ( 27 Sep 2019 17:01 )  PTT:29.5 sec  Hemoglobin A1C, Whole Blood: 6.3 % (09-28 @ 11:34)  Hemoglobin A1C, Whole Blood: 6.3 % (09-28 @ 11:10)      IMAGING: Reviewed by me.     CT head No Cont (9/27/19): No acute intracranial hemorrhage, mass effect, or shift of the midline structures.2. Small age indeterminate infarct within the left corona radiata extending to the left internal capsule.3. Additional chronic right frontal lobe infarct and chronic microvascular disease.    CTA BRAIN w/ IV Cont (9/27/19):Multifocal areas of moderate to severe stenosis involving the right posterior cerebral artery.2. Additional areas of stenoses involving the right anterior cerebral artery. Please see discussion the body of the report.3. Mild stenosis involving the origin of the left superior terminal branch of the left MCA at the MCA bifurcation.    CTA NECK w/ IV cont (9/27/19):No focal stenosis, major vessel occlusion, aneurysm or dissection of the bilateral vertebral, common carotid, and internal carotid arteries.2. Ascending aortic aneurysm measuring up to 4.7 cm.

## 2019-09-29 NOTE — PROGRESS NOTE ADULT - ASSESSMENT
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 hemiparesis, HTN, HLD, Alcoholism c/b Korsakoff's Dementia, presented to the ED with complaints of R. hemiplegia. Patient was noted to have hemiplegia of RUE RLE on the day of admission at 12pm, LKN. At that time, CTH showed NAD, CTA shows L. M1 stenosis unchanged from prior exam. Patient transferred to ICU before being transferred to [facility] for higher level of stroke management. At [facility] ED, patient presents with R. nasolabial flattening only, NIHSS 1. VSS, labs grossly WNL.     Impression: Symptomatic intracranial atherosclerosis in the form of hypoperfusion vs artery to artery embolism.    NEURO: Continue monitoring for neurologic deterioration, permissive HTN to gradual normotension, titrate statin to LDL goal less than 70, MRI Brain w/o, results as noted above. Physical therapy/OT/ recommend AR.     ANTITHROMBOTIC THERAPY: Aspirin and Plavix for 3 months and then aspirin alone     PULMONARY: Protecting airway, saturating well     CARDIOVASCULAR: check TTE, cardiac monitoring without any recorded events.   SBP goal: Permissive HTN to gradual normotension     GASTROINTESTINAL: dysphagia screen passed, tolerating diet.   Diet: Regular     RENAL: BUN/Cr without acute change  Na Goal: 145-150, avoid rapid fluctuations gradual titration   Fowler: No    HEMATOLOGY: H/H without acute change, Platelets 225, no signs or symptoms of bleeding.   DVT ppx: LMWH    ID: afebrile, no leukocytosis. No signs or symptoms of infection.     OTHER: All questions and concerns addressed to patient at bedside.     DISPOSITION: Acute rehab once stabilized and workup is complete.     CORE MEASURES:   Admission NIHSS: 1  TPA: [] YES [x] NO   LDL/HDL: 82/58  Depression Screen: 0   Statin Therapy: y   Dysphagia Screen: [x] PASS [] FAIL  Smoking [] YES [x] NO   Afib [] YES [x] NO  Stroke Education [x] YES [] NO    Obtain screening lower extremity venous ultrasound in patients who meet 1 or more of the following criteria as patient is high risk for DVT/PE on admission:   [] History of DVT/PE  []Hypercoagulable states (Factor V Leiden, Cancer, OCP, etc. 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 hemiparesis, HTN, HLD, Alcoholism c/b Korsakoff's Dementia, presented to the ED with complaints of R. hemiplegia. Patient was noted to have hemiplegia of RUE RLE on the day of admission at 12pm, LKN. At that time, CTH showed NAD, CTA shows L. M1 stenosis unchanged from prior exam. Patient transferred to ICU before being transferred to [facility] for higher level of stroke management. At [facility] ED, patient presents with R. nasolabial flattening only, NIHSS 1. VSS, labs grossly WNL.     Impression: Symptomatic intracranial atherosclerosis in the form of hypoperfusion vs artery to artery embolism.    NEURO: Neurologically stable since admission. Continue monitoring for neurologic deterioration, permissive HTN to gradual normotension, titrate statin to LDL goal less than 70. Physical therapy/OT/ recommend AR.     ANTITHROMBOTIC THERAPY: Aspirin and Plavix for 3 months and then aspirin alone     PULMONARY: Protecting airway, saturating well     CARDIOVASCULAR: check TTE, cardiac monitoring without any recorded events.   SBP goal: Permissive HTN to gradual normotension     GASTROINTESTINAL: dysphagia screen passed, tolerating diet.   Diet: Regular     RENAL: BUN/Cr without acute change  Na Goal: 145-150, avoid rapid fluctuations gradual titration   Fowler: No    HEMATOLOGY: H/H without acute change, Platelets 217, no signs or symptoms of bleeding.   DVT ppx: LMWH    ID: afebrile, no leukocytosis. No signs or symptoms of infection.     OTHER: All questions and concerns addressed to patient at bedside. Spoke with brotherMadhu over the phone and explained plan including keeping her BP permissive and slowly bringing it down with a goal of 110-140 systolic, all questions answered.     DISPOSITION: Acute rehab once stabilized and workup is complete.     CORE MEASURES:   Admission NIHSS: 1  TPA: [] YES [x] NO   LDL/HDL: 82/58  Depression Screen: 0   Statin Therapy: y   Dysphagia Screen: [x] PASS [] FAIL  Smoking [] YES [x] NO   Afib [] YES [x] NO  Stroke Education [x] YES [] NO    Obtain screening lower extremity venous ultrasound in patients who meet 1 or more of the following criteria as patient is high risk for DVT/PE on admission:   [] History of DVT/PE  []Hypercoagulable states (Factor V Leiden, Cancer, OCP, etc. 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 hemiparesis, HTN, HLD, Alcoholism c/b Korsakoff's Dementia, presented to the ED with complaints of R. hemiplegia. Patient was noted to have hemiplegia of RUE RLE on the day of admission at 12pm, LKN. At that time, CTH showed NAD, CTA shows L. M1 stenosis unchanged from prior exam. Patient transferred to ICU before being transferred to [facility] for higher level of stroke management. At [facility] ED, patient presents with R. nasolabial flattening only, NIHSS 1. VSS, labs grossly WNL.     Impression: Symptomatic intracranial atherosclerosis in the form of hypoperfusion vs artery to artery embolism.    NEURO: Neurologically stable since admission. Continue monitoring for neurologic deterioration, permissive HTN to gradual normotension, titrate statin to LDL goal less than 70. Physical therapy/OT/ recommend AR.     ANTITHROMBOTIC THERAPY: Aspirin and Plavix for 3 months and then aspirin alone;      PULMONARY: Protecting airway, saturating well     CARDIOVASCULAR: check TTE can be done outpatient, cardiac monitoring without any recorded events.   SBP goal: Permissive HTN to gradual normotension     GASTROINTESTINAL: dysphagia screen passed, tolerating diet.   Diet: Regular     RENAL: BUN/Cr without acute change  Na Goal: 145-150, avoid rapid fluctuations gradual titration   Fowler: No    HEMATOLOGY: H/H without acute change, Platelets 217, no signs or symptoms of bleeding.   DVT ppx: LMWH    ID: afebrile, no leukocytosis. No signs or symptoms of infection.     OTHER: All questions and concerns addressed to patient at bedside. Spoke with brotherMadhu over the phone and explained plan including keeping her BP permissive and slowly bringing it down with a goal of 110-140 systolic, all questions answered.     DISPOSITION: Acute rehab once stabilized and workup is complete.     CORE MEASURES:   Admission NIHSS: 1  TPA: [] YES [x] NO   LDL/HDL: 82/58  Depression Screen: 0   Statin Therapy: y   Dysphagia Screen: [x] PASS [] FAIL  Smoking [] YES [x] NO   Afib [] YES [x] NO  Stroke Education [x] YES [] NO    Obtain screening lower extremity venous ultrasound in patients who meet 1 or more of the following criteria as patient is high risk for DVT/PE on admission:   [] History of DVT/PE  []Hypercoagulable states (Factor V Leiden, Cancer, OCP, etc. )  []Prolonged immobility (hemiplegia/hemiparesis/post operative or any other extended immobilization)  [] Transferred from outside facility (Rehab or Long term care)  [] Age </= to 50

## 2019-09-29 NOTE — PROVIDER CONTACT NOTE (OTHER) - SITUATION
Patient refusing intermittent pneumatic compression device
no headache/no confusion/no syncope/no difficulty walking/no generalized seizures/no focal seizures/no loss of consciousness/no hemiparesis/no transient paralysis/no tremors/no loss of sensation

## 2019-09-30 ENCOUNTER — TRANSCRIPTION ENCOUNTER (OUTPATIENT)
Age: 69
End: 2019-09-30

## 2019-09-30 LAB
ANION GAP SERPL CALC-SCNC: 13 MMOL/L — SIGNIFICANT CHANGE UP (ref 5–17)
BUN SERPL-MCNC: 13 MG/DL — SIGNIFICANT CHANGE UP (ref 7–23)
CALCIUM SERPL-MCNC: 9.6 MG/DL — SIGNIFICANT CHANGE UP (ref 8.4–10.5)
CHLORIDE SERPL-SCNC: 105 MMOL/L — SIGNIFICANT CHANGE UP (ref 96–108)
CO2 SERPL-SCNC: 23 MMOL/L — SIGNIFICANT CHANGE UP (ref 22–31)
CREAT SERPL-MCNC: 0.65 MG/DL — SIGNIFICANT CHANGE UP (ref 0.5–1.3)
GLUCOSE BLDC GLUCOMTR-MCNC: 115 MG/DL — HIGH (ref 70–99)
GLUCOSE BLDC GLUCOMTR-MCNC: 133 MG/DL — HIGH (ref 70–99)
GLUCOSE BLDC GLUCOMTR-MCNC: 134 MG/DL — HIGH (ref 70–99)
GLUCOSE BLDC GLUCOMTR-MCNC: 230 MG/DL — HIGH (ref 70–99)
GLUCOSE SERPL-MCNC: 150 MG/DL — HIGH (ref 70–99)
HCT VFR BLD CALC: 42.4 % — SIGNIFICANT CHANGE UP (ref 34.5–45)
HGB BLD-MCNC: 13.9 G/DL — SIGNIFICANT CHANGE UP (ref 11.5–15.5)
MCHC RBC-ENTMCNC: 32.8 GM/DL — SIGNIFICANT CHANGE UP (ref 32–36)
MCHC RBC-ENTMCNC: 33 PG — SIGNIFICANT CHANGE UP (ref 27–34)
MCV RBC AUTO: 100.7 FL — HIGH (ref 80–100)
PLATELET # BLD AUTO: 232 K/UL — SIGNIFICANT CHANGE UP (ref 150–400)
POTASSIUM SERPL-MCNC: 4 MMOL/L — SIGNIFICANT CHANGE UP (ref 3.5–5.3)
POTASSIUM SERPL-SCNC: 4 MMOL/L — SIGNIFICANT CHANGE UP (ref 3.5–5.3)
RBC # BLD: 4.21 M/UL — SIGNIFICANT CHANGE UP (ref 3.8–5.2)
RBC # FLD: 12.7 % — SIGNIFICANT CHANGE UP (ref 10.3–14.5)
SODIUM SERPL-SCNC: 141 MMOL/L — SIGNIFICANT CHANGE UP (ref 135–145)
WBC # BLD: 7.24 K/UL — SIGNIFICANT CHANGE UP (ref 3.8–10.5)
WBC # FLD AUTO: 7.24 K/UL — SIGNIFICANT CHANGE UP (ref 3.8–10.5)

## 2019-09-30 PROCEDURE — 99222 1ST HOSP IP/OBS MODERATE 55: CPT | Mod: GC

## 2019-09-30 PROCEDURE — 99233 SBSQ HOSP IP/OBS HIGH 50: CPT

## 2019-09-30 RX ORDER — LISINOPRIL 2.5 MG/1
40 TABLET ORAL DAILY
Refills: 0 | Status: DISCONTINUED | OUTPATIENT
Start: 2019-10-01 | End: 2019-10-01

## 2019-09-30 RX ADMIN — DIVALPROEX SODIUM 500 MILLIGRAM(S): 500 TABLET, DELAYED RELEASE ORAL at 05:46

## 2019-09-30 RX ADMIN — ENOXAPARIN SODIUM 40 MILLIGRAM(S): 100 INJECTION SUBCUTANEOUS at 12:25

## 2019-09-30 RX ADMIN — Medication 0.5 MILLIGRAM(S): at 12:25

## 2019-09-30 RX ADMIN — Medication 0.5 MILLIGRAM(S): at 23:21

## 2019-09-30 RX ADMIN — ZOLPIDEM TARTRATE 5 MILLIGRAM(S): 10 TABLET ORAL at 21:45

## 2019-09-30 RX ADMIN — DIVALPROEX SODIUM 500 MILLIGRAM(S): 500 TABLET, DELAYED RELEASE ORAL at 17:44

## 2019-09-30 RX ADMIN — Medication 100 MILLIGRAM(S): at 21:50

## 2019-09-30 RX ADMIN — RISPERIDONE 0.25 MILLIGRAM(S): 4 TABLET ORAL at 12:25

## 2019-09-30 RX ADMIN — Medication 0.5 MILLIGRAM(S): at 17:44

## 2019-09-30 RX ADMIN — ATORVASTATIN CALCIUM 80 MILLIGRAM(S): 80 TABLET, FILM COATED ORAL at 21:45

## 2019-09-30 RX ADMIN — CLOPIDOGREL BISULFATE 75 MILLIGRAM(S): 75 TABLET, FILM COATED ORAL at 12:25

## 2019-09-30 RX ADMIN — Medication 0.5 MILLIGRAM(S): at 05:46

## 2019-09-30 RX ADMIN — LISINOPRIL 20 MILLIGRAM(S): 2.5 TABLET ORAL at 05:46

## 2019-09-30 RX ADMIN — Medication 81 MILLIGRAM(S): at 12:25

## 2019-09-30 NOTE — CONSULT NOTE ADULT - SUBJECTIVE AND OBJECTIVE BOX
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 Patient was then transferred to acute inpatient rehab at [facility]. While on rehab patient developed worsening right sided weakness and hypotension. She was transferred to the ICU and then to [facility] for higher level of stroke management. At [facility] ED, patient presented with R. nasolabial flattening only. NIHSS was 1. CT head was negative for any acute intracranial pathology, however did show age indeterminate infarct within the left corona radiata extending to the left internal capsule and chronic right frontal lobe infarct and chronic microvascular disease. CTA brain showed areas of moderate to severe stenosis of the right PCA and right CLAIRE and mild stenosis of the left superior terminal branch of the left MCA at the MCA bifurcation. Patient was admitted to the stroke unit for close monitoring and further management.   Further history obtained from brother at bedside.       REVIEW OF SYSTEMS  Constitutional - No fever, No weight loss, + fatigue  HEENT - No eye pain, No visual disturbances, No difficulty hearing, No tinnitus,   Respiratory - No cough, No wheezing, No shortness of breath  Cardiovascular - No chest pain, No palpitations  Gastrointestinal - No abdominal pain, No nausea, No vomiting, No diarrhea, No constipation  Genitourinary - No dysuria, No frequency, No incontinence  Neurological - No headaches, No memory loss, +weakness, No numbness, + tremors  Skin - No itching, No rashes, No lesions   Musculoskeletal - No joint pain, No joint swelling, No muscle pain  Psychiatric - No depression, No anxiety    PAST MEDICAL & SURGICAL HISTORY  Alcohol dependence  Hyperlipemia  HTN (hypertension)  Depression      SOCIAL HISTORY  Smoking - Denied  EtOH - prior history of etoh abuse quit 10 years ago   Drugs - Denied    PRIOR FUNCTIONAL HISTORY  Ambulation: independent with RW  ADLs: requires assistance with bathing, dressing, meals    Previous Home Equipment: RW    HOME ENVIRONMENT:  Type of Home: assisted living facility  Stairs: 0  Living With: alone   Caregiver's availability: yes    Special Needs or Precautions:  Weight bearing status: WBAT    FAMILY HISTORY   reviewed and non contributory    RECENT LABS/IMAGING  CBC Full  -  ( 30 Sep 2019 08:18 )  WBC Count : 7.24 K/uL  RBC Count : 4.21 M/uL  Hemoglobin : 13.9 g/dL  Hematocrit : 42.4 %  Platelet Count - Automated : 232 K/uL  Mean Cell Volume : 100.7 fl  Mean Cell Hemoglobin : 33.0 pg  Mean Cell Hemoglobin Concentration : 32.8 gm/dL  Auto Neutrophil # : x  Auto Lymphocyte # : x  Auto Monocyte # : x  Auto Eosinophil # : x  Auto Basophil # : x  Auto Neutrophil % : x  Auto Lymphocyte % : x  Auto Monocyte % : x  Auto Eosinophil % : x  Auto Basophil % : x    09-30    141  |  105  |  13  ----------------------------<  150<H>  4.0   |  23  |  0.65    Ca    9.6      30 Sep 2019 06:30      IMAGING  < from: CT Angio Head w/ IV Cont (09.27.19 @ 18:17) >  CT head:  1. No acute intracranial hemorrhage, mass effect, or shift of the midline structures.  2. Small age indeterminate infarct within the left corona radiata extending to the left internal capsule.  3. Additional chronic right frontal lobe infarct and chronic microvascular disease.    CTA BRAIN:  1. Multifocal areas of moderate to severe stenosis involving the right posterior cerebral artery.  2. Additional areas of stenoses involving the right anterior cerebral artery. Please see discussion the body of the report.  3. Mild stenosis involving the origin of the left superior terminal branch of the left MCA at the MCA bifurcation.    CTA NECK:  1. No focal stenosis, major vessel occlusion, aneurysm or dissection of the bilateral vertebral, common carotid, and internal carotid arteries.  2. Ascending aortic aneurysm measuring up to 4.7 cm.      VITALS  T(C): 36.8 (09-30-19 @ 08:14), Max: 36.9 (09-29-19 @ 12:08)  HR: 83 (09-30-19 @ 08:14) (83 - 97)  BP: 191/80 (09-30-19 @ 08:14) (147/83 - 191/80)  RR: 18 (09-30-19 @ 08:14) (18 - 18)  SpO2: 97% (09-30-19 @ 08:14) (94% - 97%)  Wt(kg): --    ALLERGIES  No Known Allergies      MEDICATIONS   acetaminophen   Tablet .. 650 milliGRAM(s) Oral every 6 hours PRN  aspirin  chewable 81 milliGRAM(s) Oral daily  atorvastatin 80 milliGRAM(s) Oral at bedtime  clopidogrel Tablet 75 milliGRAM(s) Oral daily  dextrose 40% Gel 15 Gram(s) Oral once PRN  dextrose 5%. 1000 milliLiter(s) IV Continuous <Continuous>  dextrose 50% Injectable 12.5 Gram(s) IV Push once  diVALproex Sprinkle 500 milliGRAM(s) Oral two times a day  docusate sodium 100 milliGRAM(s) Oral daily  enoxaparin Injectable 40 milliGRAM(s) SubCutaneous daily  glucagon  Injectable 1 milliGRAM(s) IntraMuscular once PRN  insulin lispro (HumaLOG) corrective regimen sliding scale   SubCutaneous three times a day before meals  insulin lispro (HumaLOG) corrective regimen sliding scale   SubCutaneous at bedtime  lisinopril 20 milliGRAM(s) Oral daily  LORazepam     Tablet 0.5 milliGRAM(s) Oral four times a day  risperiDONE   Tablet 0.25 milliGRAM(s) Oral daily  traZODone 100 milliGRAM(s) Oral at bedtime  zolpidem 5 milliGRAM(s) Oral at bedtime PRN  zolpidem 5 milliGRAM(s) Oral at bedtime PRN      ----------------------------------------------------------------------------------------  PHYSICAL EXAM  Constitutional - NAD, Comfortable, sitting in chair at bedside  HEENT - NCAT, EOMI  Neck - Supple, No limited ROM  Chest - CTA bilaterally, No wheezing  Cardiovascular - RRR, S1S2,  Abdomen - BS+, Soft, NT, ND  Extremities - No C/C/E, No calf tenderness   Neurologic Exam -                    Cognitive - AAOx2 (person & place), NAD, follows commands approprietly     Communication - Fluent, mild dysarthria     Cranial Nerves - EOMI, facial sensation intact, no facial droop appreciated, tongue midline, hearing intact, shoulder shrug intact     Motor - No focal deficits                    LEFT    UE - ShAB 5/5, EF 5/5, EE 5/5, WE 5/5,  5/5                    RIGHT UE - ShAB 4+/5, EF 4/5, EE 4/5, WE 4/5,  4/5                    LEFT    LE - HF 3/5 (poor pt. effort),, KE 5/5, DF 5/5, PF 5/5                    RIGHT LE - HF 3/5 (poor pt. effort), KE 5/5, DF 5/5, PF 5/5        Sensory - Intact to LT     Reflexes - DTR Intact and equal     Coordination - FTN fair bilaterally, +tremor      Psychiatric - Mood stable, Affect WNL    CURRENT FUNCTIONAL STATUS  PT 9/28  Bed Mobility: Supine to Sit:     · Level of Murray  minimum assist (75% patients effort)    · Physical Assist/Nonphysical Assist  1 person assist; nonverbal cues (demo/gestures); verbal cues    · Assistive Device  bed rails      Bed Mobility Analysis:     · Impairments Contributing to Impaired Bed Mobility  impaired balance; cognition; decreased strength      Transfer: Sit to Stand:     · Level of [place]  minimum assist (75% patients effort)    · Physical Assist/Nonphysical Assist  1 person assist; nonverbal cues (demo/gestures); verbal cues    · Weight-Bearing Restrictions  full weight-bearing    · Assistive Device  rolling walker      Transfer: Stand to Sit:     · Level of Murray  minimum assist (75% patients effort)    · Physical Assist/Nonphysical Assist  1 person assist; nonverbal cues (demo/gestures); verbal cues    · Weight-Bearing Restrictions  full weight-bearing    · Assistive Device  rolling walker      Sit/Stand Transfer Safety Analysis:     · Transfer Safety Concerns Noted  decreased safety awareness; started to sit when chair was not directly behind pt    · Impairments Contributing to Impaired Transfers  impaired balance; cognition; decreased strength      Gait Skills:     · Level of Murray  minimum assist (75% patients effort)    · Physical Assist/Nonphysical Assist  1 person assist; nonverbal cues (demo/gestures); verbal cues    · Weight-Bearing Restrictions  full weight-bearing    · Assistive Device  rolling walker    · Gait Distance  16ftx2      Gait Analysis:     · Gait Deviations Noted  decreased angelina; decreased step length; veer to L    · Impairments Contributing to Gait Deviations  impaired balance; decreased strength; required assistance with maneuvering rolling walker especially during turns      Balance Skills Assessment:     · Sitting Balance: Static  fair plus     · Sitting Balance: Dynamic  fair balance     · Sit-to-Stand Balance  fair minus  with rolling walker     · Standing Balance: Static  fair balance  with rolling walker     · Standing Balance: Dynamic  fair minus  with rolling walker       Fine Motor Coordination:   {[id number]} Fine Motor Coordination Examination:    Fine Motor Coordination:   · Left Hand, Finger to Nose  normal performance     · Right Hand, Finger to Nose  mild impairment     · Fine Motor Coordination Tests  LUE finger to finger intact, RUE finger to finger mild impairment        OT 9/28  Fine Motor Coordination:   · Left Hand, Finger to Nose  mild impairment     · Right Hand, Finger to Nose  mild impairment     · Left Hand Thumb/Finger Opposition Skills  mild impairment     · Right Hand Thumb/Finger Opposition Skills  mild impairment     · Left Hand, Manipulation of Objects  mild impairment  +tremor noted     · Right Hand, Manipulation of Objects  mild impairment       Upper Body Dressing Training:     · Level of Murray  minimum assist (75% patients effort)    · Physical Assist/Nonphysical Assist  1 person assist      Lower Body Dressing Training:     · Level of Murray  moderate assist (50% patients effort)    · Physical Assist/Nonphysical Assist  1 person assist; nonverbal cues (demo/gestures); verbal cues      Grooming Training:     · Level of Murray  supervision    · Physical Assist/Nonphysical Assist  supervision; set-up required      Eating/Self-Feeding Training:     · Level of Murray  supervision    · Physical Assist/Nonphysical Assist  set-up required; supervision      ----------------------------------------------------------------------------------------  ASSESSMENT/PLAN    68 yo Female with functional deficits after acute CVA and symptomatic intracranial atherosclerosis in the form of hypoperfusion vs artery to artery embolism.    CVA with right sided weakness - as per neurology management, plavix, aspirin  HLD - lipitor  Insomnia - ambien, trazodone  Dementia/Mood disorder - ativan, risperdal, depakote  Pain - Tylenol  DVT PPX - SCDs, Lovenox  Rehab -   Continue bedside therapy as well as OOB throughout the day with mobilization by staff to maintain cardiopulmonary function and prevention of secondary complications related to debility.   PT- ROM, Bed Mobility, transfers, amb w AD, GCEs  OT- ADLs, energy conservation  SLP- Dysphagia eval and tx    Recommend ACUTE inpatient rehabilitation for the functional deficits consisting of 3 hours of therapy/day & 24 hour RN/daily PMR physician for comorbid medical management. Patient will be able to tolerate 3 hours a day.    Will continue to follow for ongoing rehab needs and recommendations. . Functional progress will determine ongoing rehab dispo recommendations, which may change. 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 Patient was then transferred to acute inpatient rehab at [facility]. While on rehab patient developed worsening right sided weakness and hypotension. She was transferred to the ICU and then to [facility] for higher level of stroke management. At [facility] ED, patient presented with R. nasolabial flattening only. NIHSS was 1. CT head was negative for any acute intracranial pathology, however did show age indeterminate infarct within the left corona radiata extending to the left internal capsule and chronic right frontal lobe infarct and chronic microvascular disease. CTA brain showed areas of moderate to severe stenosis of the right PCA and right CLAIRE and mild stenosis of the left superior terminal branch of the left MCA at the MCA bifurcation. Patient was admitted to the stroke unit for close monitoring and further management.   Further history obtained from brother at bedside.       REVIEW OF SYSTEMS  Constitutional - No fever, No weight loss, + fatigue  HEENT - No eye pain, No visual disturbances, No difficulty hearing, No tinnitus,   Respiratory - No cough, No wheezing, No shortness of breath  Cardiovascular - No chest pain, No palpitations  Gastrointestinal - No abdominal pain, No nausea, No vomiting, No diarrhea, No constipation  Genitourinary - No dysuria, No frequency, No incontinence  Neurological - No headaches, No memory loss, +weakness, No numbness, + tremors  Skin - No itching, No rashes, No lesions   Musculoskeletal - No joint pain, No joint swelling, No muscle pain  Psychiatric - No depression, No anxiety    PAST MEDICAL & SURGICAL HISTORY  Alcohol dependence  Hyperlipemia  HTN (hypertension)  Depression      SOCIAL HISTORY  Smoking - Denied  EtOH - prior history of etoh abuse quit 10 years ago   Drugs - Denied    PRIOR FUNCTIONAL HISTORY  Ambulation: independent with RW  ADLs: requires assistance with bathing, dressing, meals    Previous Home Equipment: RW    HOME ENVIRONMENT:  Type of Home: assisted living facility  Stairs: 0  Living With: alone   Caregiver's availability: yes    Special Needs or Precautions:  Weight bearing status: WBAT    FAMILY HISTORY   reviewed and non contributory    RECENT LABS/IMAGING  CBC Full  -  ( 30 Sep 2019 08:18 )  WBC Count : 7.24 K/uL  RBC Count : 4.21 M/uL  Hemoglobin : 13.9 g/dL  Hematocrit : 42.4 %  Platelet Count - Automated : 232 K/uL  Mean Cell Volume : 100.7 fl  Mean Cell Hemoglobin : 33.0 pg  Mean Cell Hemoglobin Concentration : 32.8 gm/dL  Auto Neutrophil # : x  Auto Lymphocyte # : x  Auto Monocyte # : x  Auto Eosinophil # : x  Auto Basophil # : x  Auto Neutrophil % : x  Auto Lymphocyte % : x  Auto Monocyte % : x  Auto Eosinophil % : x  Auto Basophil % : x    09-30    141  |  105  |  13  ----------------------------<  150<H>  4.0   |  23  |  0.65    Ca    9.6      30 Sep 2019 06:30      IMAGING  < from: CT Angio Head w/ IV Cont (09.27.19 @ 18:17) >  CT head:  1. No acute intracranial hemorrhage, mass effect, or shift of the midline structures.  2. Small age indeterminate infarct within the left corona radiata extending to the left internal capsule.  3. Additional chronic right frontal lobe infarct and chronic microvascular disease.    CTA BRAIN:  1. Multifocal areas of moderate to severe stenosis involving the right posterior cerebral artery.  2. Additional areas of stenoses involving the right anterior cerebral artery. Please see discussion the body of the report.  3. Mild stenosis involving the origin of the left superior terminal branch of the left MCA at the MCA bifurcation.    CTA NECK:  1. No focal stenosis, major vessel occlusion, aneurysm or dissection of the bilateral vertebral, common carotid, and internal carotid arteries.  2. Ascending aortic aneurysm measuring up to 4.7 cm.      VITALS  T(C): 36.8 (09-30-19 @ 08:14), Max: 36.9 (09-29-19 @ 12:08)  HR: 83 (09-30-19 @ 08:14) (83 - 97)  BP: 191/80 (09-30-19 @ 08:14) (147/83 - 191/80)  RR: 18 (09-30-19 @ 08:14) (18 - 18)  SpO2: 97% (09-30-19 @ 08:14) (94% - 97%)  Wt(kg): --    ALLERGIES  No Known Allergies      MEDICATIONS   acetaminophen   Tablet .. 650 milliGRAM(s) Oral every 6 hours PRN  aspirin  chewable 81 milliGRAM(s) Oral daily  atorvastatin 80 milliGRAM(s) Oral at bedtime  clopidogrel Tablet 75 milliGRAM(s) Oral daily  dextrose 40% Gel 15 Gram(s) Oral once PRN  dextrose 5%. 1000 milliLiter(s) IV Continuous <Continuous>  dextrose 50% Injectable 12.5 Gram(s) IV Push once  diVALproex Sprinkle 500 milliGRAM(s) Oral two times a day  docusate sodium 100 milliGRAM(s) Oral daily  enoxaparin Injectable 40 milliGRAM(s) SubCutaneous daily  glucagon  Injectable 1 milliGRAM(s) IntraMuscular once PRN  insulin lispro (HumaLOG) corrective regimen sliding scale   SubCutaneous three times a day before meals  insulin lispro (HumaLOG) corrective regimen sliding scale   SubCutaneous at bedtime  lisinopril 20 milliGRAM(s) Oral daily  LORazepam     Tablet 0.5 milliGRAM(s) Oral four times a day  risperiDONE   Tablet 0.25 milliGRAM(s) Oral daily  traZODone 100 milliGRAM(s) Oral at bedtime  zolpidem 5 milliGRAM(s) Oral at bedtime PRN  zolpidem 5 milliGRAM(s) Oral at bedtime PRN      ----------------------------------------------------------------------------------------  PHYSICAL EXAM  Constitutional - NAD, Comfortable, sitting in chair at bedside  HEENT - NCAT, EOMI  Neck - Supple, No limited ROM  Chest - CTA bilaterally, No wheezing  Cardiovascular - RRR, S1S2,  Abdomen - BS+, Soft, NT, ND  Extremities - No C/C/E, No calf tenderness   Neurologic Exam -                    Cognitive - AAOx2 (person & place), NAD, follows commands approprietly     Communication - Fluent, mild dysarthria     Cranial Nerves - EOMI, facial sensation intact, no facial droop appreciated, tongue midline, hearing intact, shoulder shrug intact     Motor - No focal deficits                    LEFT    UE - ShAB 5/5, EF 5/5, EE 5/5, WE 5/5,  5/5                    RIGHT UE - ShAB 4+/5, EF 4/5, EE 4/5, WE 4/5,  4/5                    LEFT    LE - HF 3/5 (poor pt. effort),, KE 5/5, DF 5/5, PF 5/5                    RIGHT LE - HF 3/5 (poor pt. effort), KE 5/5, DF 5/5, PF 5/5        Sensory - Intact to LT     Reflexes - DTR Intact and equal     Coordination - FTN fair bilaterally, +tremor      Psychiatric - Mood stable, Affect WNL    CURRENT FUNCTIONAL STATUS  PT 9/28  Bed Mobility: Supine to Sit:     · Level of [place]  minimum assist (75% patients effort)    · Physical Assist/Nonphysical Assist  1 person assist; nonverbal cues (demo/gestures); verbal cues    · Assistive Device  bed rails      Bed Mobility Analysis:     · Impairments Contributing to Impaired Bed Mobility  impaired balance; cognition; decreased strength      Transfer: Sit to Stand:     · Level of [place]  minimum assist (75% patients effort)    · Physical Assist/Nonphysical Assist  1 person assist; nonverbal cues (demo/gestures); verbal cues    · Weight-Bearing Restrictions  full weight-bearing    · Assistive Device  rolling walker      Transfer: Stand to Sit:     · Level of [place]  minimum assist (75% patients effort)    · Physical Assist/Nonphysical Assist  1 person assist; nonverbal cues (demo/gestures); verbal cues    · Weight-Bearing Restrictions  full weight-bearing    · Assistive Device  rolling walker      Sit/Stand Transfer Safety Analysis:     · Transfer Safety Concerns Noted  decreased safety awareness; started to sit when chair was not directly behind pt    · Impairments Contributing to Impaired Transfers  impaired balance; cognition; decreased strength      Gait Skills:     · Level of [place]  minimum assist (75% patients effort)    · Physical Assist/Nonphysical Assist  1 person assist; nonverbal cues (demo/gestures); verbal cues    · Weight-Bearing Restrictions  full weight-bearing    · Assistive Device  rolling walker    · Gait Distance  16ftx2      Gait Analysis:     · Gait Deviations Noted  decreased angelina; decreased step length; veer to L    · Impairments Contributing to Gait Deviations  impaired balance; decreased strength; required assistance with maneuvering rolling walker especially during turns      Balance Skills Assessment:     · Sitting Balance: Static  fair plus     · Sitting Balance: Dynamic  fair balance     · Sit-to-Stand Balance  fair minus  with rolling walker     · Standing Balance: Static  fair balance  with rolling walker     · Standing Balance: Dynamic  fair minus  with rolling walker       Fine Motor Coordination:   {[id number]} Fine Motor Coordination Examination:    Fine Motor Coordination:   · Left Hand, Finger to Nose  normal performance     · Right Hand, Finger to Nose  mild impairment     · Fine Motor Coordination Tests  LUE finger to finger intact, RUE finger to finger mild impairment        OT 9/28  Fine Motor Coordination:   · Left Hand, Finger to Nose  mild impairment     · Right Hand, Finger to Nose  mild impairment     · Left Hand Thumb/Finger Opposition Skills  mild impairment     · Right Hand Thumb/Finger Opposition Skills  mild impairment     · Left Hand, Manipulation of Objects  mild impairment  +tremor noted     · Right Hand, Manipulation of Objects  mild impairment       Upper Body Dressing Training:     · Level of [place]  minimum assist (75% patients effort)    · Physical Assist/Nonphysical Assist  1 person assist      Lower Body Dressing Training:     · Level of [place]  moderate assist (50% patients effort)    · Physical Assist/Nonphysical Assist  1 person assist; nonverbal cues (demo/gestures); verbal cues      Grooming Training:     · Level of [place]  supervision    · Physical Assist/Nonphysical Assist  supervision; set-up required      Eating/Self-Feeding Training:     · Level of Oliver  supervision    · Physical Assist/Nonphysical Assist  set-up required; supervision      ----------------------------------------------------------------------------------------

## 2019-09-30 NOTE — PROGRESS NOTE ADULT - ASSESSMENT
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 hemiparesis, HTN, HLD, Alcoholism c/b Korsakoff's Dementia, presented to the ED with complaints of R. hemiplegia. Patient was noted to have hemiplegia of RUE RLE on the day of admission at 12pm, LKN. At that time, CT of the head done at [facility]   no major area of hypodensity identified other that bilateral basal ganglia and left insular chronic ischemic changes noted. Changes of small vessel disease along with age related atrophy evident. Head and neck angiography pertinent for multiple intracranial atherosclerosis. Left M2 superior division area of stenosis noticed.    Impression: Symptomatic intracranial atherosclerosis in the form of hypoperfusion vs artery to artery embolism.    NEURO: Neurologically stable since admission. Continue monitoring for neurologic deterioration, permissive HTN to gradual normotension as tolerated, titrate statin to LDL goal less than 70. Physical therapy/OT/ recommend AR.     ANTITHROMBOTIC THERAPY: Aspirin and Plavix for 3 months and then aspirin alone; P2Y12 103     PULMONARY: Protecting airway, saturating well     CARDIOVASCULAR: check TTE can be done outpatient, cardiac monitoring without any recorded events.   SBP goal: Permissive HTN to gradual normotension     GASTROINTESTINAL: dysphagia screen passed, tolerating diet.   Diet: Regular     RENAL: BUN/Cr without acute change, maintain adequate hydration   Na Goal: 145-150, avoid rapid fluctuations gradual titration   Fowler: No    HEMATOLOGY: H/H without acute change, Platelets 217, no signs or symptoms of bleeding.   DVT ppx: LMWH    ID: afebrile, no leukocytosis. No signs or symptoms of infection.     OTHER: All questions and concerns addressed to patient at bedside. Spoke with brother, Madhu over the phone and explained plan including keeping her BP permissive for now then slowly bringing it down with a goal of 110-140 systolic, all questions answered.     DISPOSITION: Acute rehab once stabilized and workup is complete.     CORE MEASURES:   Admission NIHSS: 1  TPA: [] YES [x] NO   LDL/HDL: 82/58  Depression Screen: 0   Statin Therapy: y   Dysphagia Screen: [x] PASS [] FAIL  Smoking [] YES [x] NO   Afib [] YES [x] NO  Stroke Education [x] YES [] NO    Obtain screening lower extremity venous ultrasound in patients who meet 1 or more of the following criteria as patient is high risk for DVT/PE on admission:   [] History of DVT/PE  []Hypercoagulable states (Factor V Leiden, Cancer, OCP, etc. 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 hemiparesis, HTN, HLD, Alcoholism c/b Korsakoff's Dementia, presented to the ED with complaints of R. hemiplegia. Patient was noted to have hemiplegia of RUE RLE on the day of admission at 12pm, LKN. At that time, CT of the head done at [facility]   no major area of hypodensity identified other that bilateral basal ganglia and left insular chronic ischemic changes noted. Changes of small vessel disease along with age related atrophy evident. Head and neck angiography pertinent for multiple intracranial atherosclerosis. Left M2 superior division area of stenosis noticed.    Impression: Symptomatic intracranial atherosclerosis in the form of hypoperfusion vs artery to artery embolism.    NEURO: Neurologically stable since admission. Continue monitoring for neurologic deterioration, permissive HTN to gradual normotension as tolerated, LDL 82 will discharge on home statin. Physical therapy/OT/ recommend AR.     ANTITHROMBOTIC THERAPY: Aspirin and Plavix for 3 months and then aspirin alone; P2Y12 103     PULMONARY: Protecting airway, saturating well on room air    CARDIOVASCULAR: check TTE can be done outpatient, cardiac monitoring without any recorded events.   SBP goal: Permissive HTN to gradual normotension     GASTROINTESTINAL: dysphagia screen passed, tolerating diet.   Diet: Regular     RENAL: BUN/Cr without acute change, maintain adequate hydration   Na Goal: 145-150, avoid rapid fluctuations gradual titration   Fowler: No    HEMATOLOGY: H/H without acute change, Platelets 232, no signs or symptoms of bleeding.   DVT ppx: LMWH    ID: afebrile, no leukocytosis. No signs or symptoms of infection.     OTHER: All questions and concerns addressed to patient at bedside. Spoke with brother in person today Madhu and explained plan including keeping her BP permissive for now then slowly bringing it down with a goal of 110-140 systolic, all questions answered.     DISPOSITION: Acute rehab once stabilized and workup is complete.     CORE MEASURES:   Admission NIHSS: 1  TPA: [] YES [x] NO   LDL/HDL: 82/58  Depression Screen: 0   Statin Therapy: y   Dysphagia Screen: [x] PASS [] FAIL  Smoking [] YES [x] NO   Afib [] YES [x] NO  Stroke Education [x] YES [] NO    Obtain screening lower extremity venous ultrasound in patients who meet 1 or more of the following criteria as patient is high risk for DVT/PE on admission:   [] History of DVT/PE  []Hypercoagulable states (Factor V Leiden, Cancer, OCP, etc. 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 hemiparesis, HTN, HLD, Alcoholism c/b Korsakoff's Dementia, presented to the ED with complaints of R. hemiplegia. Patient was noted to have hemiplegia of RUE RLE on the day of admission at 12pm, LKN. At that time, CT of the head done at [facility]   no major area of hypodensity identified other that bilateral basal ganglia and left insular chronic ischemic changes noted. Changes of small vessel disease along with age related atrophy evident. Head and neck angiography pertinent for multiple intracranial atherosclerosis. Left M2 superior division area of stenosis noticed.    Impression: Symptomatic intracranial atherosclerosis in the form of hypoperfusion vs artery to artery embolism.    NEURO: Neurologically stable since admission. Continue monitoring for neurologic deterioration, permissive HTN to gradual normotension as tolerated, LDL 82 will discharge on home statin. Physical therapy/OT/ recommend AR.     ANTITHROMBOTIC THERAPY: Aspirin and Plavix for 3 months and then aspirin alone; P2Y12 103     PULMONARY: Protecting airway, saturating well on room air    CARDIOVASCULAR: check TTE can be done outpatient, cardiac monitoring without any recorded events. Lisinopril 20mg restarted systolic 188 will continue medication titration  SBP goal: Permissive HTN to gradual normotension     GASTROINTESTINAL: dysphagia screen passed, tolerating diet.   Diet: Regular     RENAL: BUN/Cr without acute change, maintain adequate hydration   Na Goal: 145-150, avoid rapid fluctuations gradual titration   Fowler: No    HEMATOLOGY: H/H without acute change, Platelets 232, no signs or symptoms of bleeding.   DVT ppx: LMWH    ID: afebrile, no leukocytosis. No signs or symptoms of infection.     OTHER: All questions and concerns addressed to patient at bedside. Spoke with brother in person today Madhu and explained plan including keeping her BP permissive for now then slowly bringing it down with a goal of 110-140 systolic, all questions answered.     DISPOSITION: Acute rehab once stabilized and workup is complete.     CORE MEASURES:   Admission NIHSS: 1  TPA: [] YES [x] NO   LDL/HDL: 82/58  Depression Screen: 0   Statin Therapy: y   Dysphagia Screen: [x] PASS [] FAIL  Smoking [] YES [x] NO   Afib [] YES [x] NO  Stroke Education [x] YES [] NO    Obtain screening lower extremity venous ultrasound in patients who meet 1 or more of the following criteria as patient is high risk for DVT/PE on admission:   [] History of DVT/PE  []Hypercoagulable states (Factor V Leiden, Cancer, OCP, etc. )  []Prolonged immobility (hemiplegia/hemiparesis/post operative or any other extended immobilization)  [] Transferred from outside facility (Rehab or Long term care)  [] Age </= to 50

## 2019-09-30 NOTE — PROGRESS NOTE ADULT - SUBJECTIVE AND OBJECTIVE BOX
THE PATIENT WAS SEEN AND EXAMINED BY ME WITH THE HOUSESTAFF AND STROKE TEAM DURING MORNING ROUNDS.   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 hemiparesis, HTN, HLD, Alcoholism c/b Korsakoff's Dementia, presented to the ED with complaints of R. hemiplegia. Patient was noted to have hemiplegia of RUE RLE on the day of admission at 12pm, LKN. At that time, CTH showed NAD, CTA shows L. M1 stenosis unchanged from prior exam. Patient transferred to ICU before being transferred to [facility] for higher level of stroke management. At [facility] ED, patient presented with R. nasolabial flattening only, NIHSS 1. VSS, labs grossly WNL. Denied fevers, chills, ns, cp, sob, abd pain, n/v/d/c, or changes to weight or senses.    SUBJECTIVE: No events overnight.  No new neurologic complaints.      acetaminophen   Tablet .. 650 milliGRAM(s) Oral every 6 hours PRN  aspirin  chewable 81 milliGRAM(s) Oral daily  atorvastatin 80 milliGRAM(s) Oral at bedtime  clopidogrel Tablet 75 milliGRAM(s) Oral daily  dextrose 40% Gel 15 Gram(s) Oral once PRN  dextrose 5%. 1000 milliLiter(s) IV Continuous <Continuous>  dextrose 50% Injectable 12.5 Gram(s) IV Push once  diVALproex Sprinkle 500 milliGRAM(s) Oral two times a day  docusate sodium 100 milliGRAM(s) Oral daily  enoxaparin Injectable 40 milliGRAM(s) SubCutaneous daily  glucagon  Injectable 1 milliGRAM(s) IntraMuscular once PRN  insulin lispro (HumaLOG) corrective regimen sliding scale   SubCutaneous three times a day before meals  insulin lispro (HumaLOG) corrective regimen sliding scale   SubCutaneous at bedtime  lisinopril 20 milliGRAM(s) Oral daily  LORazepam     Tablet 0.5 milliGRAM(s) Oral four times a day  risperiDONE   Tablet 0.25 milliGRAM(s) Oral daily  simvastatin 40 milliGRAM(s) Oral at bedtime  traZODone 100 milliGRAM(s) Oral at bedtime  zolpidem 5 milliGRAM(s) Oral at bedtime PRN  zolpidem 5 milliGRAM(s) Oral at bedtime PRN      PHYSICAL EXAM:   Vital Signs Last 24 Hrs  T(C): 36.8 (30 Sep 2019 04:52), Max: 36.9 (29 Sep 2019 12:08)  T(F): 98.2 (30 Sep 2019 04:52), Max: 98.5 (29 Sep 2019 12:08)  HR: 84 (30 Sep 2019 04:52) (84 - 97)  BP: 147/83 (30 Sep 2019 04:52) (147/83 - 188/81)  BP(mean): --  RR: 18 (30 Sep 2019 04:52) (18 - 18)  SpO2: 95% (30 Sep 2019 04:52) (94% - 97%)    General: No acute distress  HEENT: EOM intact  Abdomen: Soft, nontender, nondistended   Extremities: No edema    NEUROLOGICAL EXAM:  Mental status: Awake, alert, oriented to person, place, and time, speech is fluent with intact naming and repetition  Cranial Nerves: R UMN lower facial palsy w/o correction on smile, no nystagmus, no dysarthria, tongue midline  Motor exam: Normal tone, strength 5/5 throughout   Sensation: Intact to light touch   Coordination/ Gait: No dysmetria, gait deferred   LABS:                        13.8   6.99  )-----------( 217      ( 29 Sep 2019 09:38 )             42.4    09-29    139  |  103  |  14  ----------------------------<  154<H>  4.2   |  24  |  0.60    Ca    10.0      29 Sep 2019 06:07      Hemoglobin A1C, Whole Blood: 6.3 % (09-29 @ 09:38)  Hemoglobin A1C, Whole Blood: 6.3 % (09-28 @ 11:34)  Hemoglobin A1C, Whole Blood: 6.3 % (09-28 @ 11:10)      IMAGING: Reviewed by me.   CT head No Cont (9/27/19): No acute intracranial hemorrhage, mass effect, or shift of the midline structures.2. Small age indeterminate infarct within the left corona radiata extending to the left internal capsule.3. Additional chronic right frontal lobe infarct and chronic microvascular disease.    CTA BRAIN w/ IV Cont (9/27/19):Multifocal areas of moderate to severe stenosis involving the right posterior cerebral artery.2. Additional areas of stenoses involving the right anterior cerebral artery. Please see discussion the body of the report.3. Mild stenosis involving the origin of the left superior terminal branch of the left MCA at the MCA bifurcation.    CTA NECK w/ IV cont (9/27/19):No focal stenosis, major vessel occlusion, aneurysm or dissection of the bilateral vertebral, common carotid, and internal carotid arteries.2. Ascending aortic aneurysm measuring up to 4.7 cm. THE PATIENT WAS SEEN AND EXAMINED BY ME WITH THE HOUSESTAFF AND STROKE TEAM DURING MORNING ROUNDS.   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 hemiparesis, HTN, HLD, Alcoholism c/b Korsakoff's Dementia, presented to the ED with complaints of R. hemiplegia. Patient was noted to have hemiplegia of RUE RLE on the day of admission at 12pm, LKN. At that time, CTH showed NAD, CTA shows L. M1 stenosis unchanged from prior exam. Patient transferred to ICU before being transferred to [facility] for higher level of stroke management. At [facility] ED, patient presented with R. nasolabial flattening only, NIHSS 1. VSS, labs grossly WNL. Denied fevers, chills, ns, cp, sob, abd pain, n/v/d/c, or changes to weight or senses.    SUBJECTIVE: No events overnight.  No new neurologic complaints.      acetaminophen   Tablet .. 650 milliGRAM(s) Oral every 6 hours PRN  aspirin  chewable 81 milliGRAM(s) Oral daily  atorvastatin 80 milliGRAM(s) Oral at bedtime  clopidogrel Tablet 75 milliGRAM(s) Oral daily  dextrose 40% Gel 15 Gram(s) Oral once PRN  dextrose 5%. 1000 milliLiter(s) IV Continuous <Continuous>  dextrose 50% Injectable 12.5 Gram(s) IV Push once  diVALproex Sprinkle 500 milliGRAM(s) Oral two times a day  docusate sodium 100 milliGRAM(s) Oral daily  enoxaparin Injectable 40 milliGRAM(s) SubCutaneous daily  glucagon  Injectable 1 milliGRAM(s) IntraMuscular once PRN  insulin lispro (HumaLOG) corrective regimen sliding scale   SubCutaneous three times a day before meals  insulin lispro (HumaLOG) corrective regimen sliding scale   SubCutaneous at bedtime  lisinopril 20 milliGRAM(s) Oral daily  LORazepam     Tablet 0.5 milliGRAM(s) Oral four times a day  risperiDONE   Tablet 0.25 milliGRAM(s) Oral daily  simvastatin 40 milliGRAM(s) Oral at bedtime  traZODone 100 milliGRAM(s) Oral at bedtime  zolpidem 5 milliGRAM(s) Oral at bedtime PRN  zolpidem 5 milliGRAM(s) Oral at bedtime PRN      PHYSICAL EXAM:   Vital Signs Last 24 Hrs  T(C): 36.8 (30 Sep 2019 04:52), Max: 36.9 (29 Sep 2019 12:08)  T(F): 98.2 (30 Sep 2019 04:52), Max: 98.5 (29 Sep 2019 12:08)  HR: 84 (30 Sep 2019 04:52) (84 - 97)  BP: 147/83 (30 Sep 2019 04:52) (147/83 - 188/81)  BP(mean): --  RR: 18 (30 Sep 2019 04:52) (18 - 18)  SpO2: 95% (30 Sep 2019 04:52) (94% - 97%)        General: No acute distress  HEENT: EOM intact  Abdomen: Soft, nontender, nondistended   Extremities: No edema    NEUROLOGICAL EXAM:  Mental status: Awake, alert, oriented to person, place, and time, speech is fluent with intact naming and repetition  Cranial Nerves: minimal Right nasolabial fold flattening, no nystagmus, no dysarthria, tongue midline  Motor exam: Normal tone, RUE/RLE hemiparesis strength 4/5, LUE/LLE spontaneous movement 5/5 strength  Sensation: Intact to light touch   Coordination/ Gait: No dysmetria, gait deferred   LABS:                        13.9   7.24  )-----------( 232      ( 30 Sep 2019 08:18 )             42.4    09-30    141  |  105  |  13  ----------------------------<  133<H>  4.0   |  23  |  0.65    Ca    9.6      30 Sep 2019 06:30      Hemoglobin A1C, Whole Blood: 6.3 % (09-29 @ 09:38)  Hemoglobin A1C, Whole Blood: 6.3 % (09-28 @ 11:34)  Hemoglobin A1C, Whole Blood: 6.3 % (09-28 @ 11:10)      IMAGING: Reviewed by me.   CT head No Cont (9/27/19): No acute intracranial hemorrhage, mass effect, or shift of the midline structures.2. Small age indeterminate infarct within the left corona radiata extending to the left internal capsule.3. Additional chronic right frontal lobe infarct and chronic microvascular disease.    CTA BRAIN w/ IV Cont (9/27/19):Multifocal areas of moderate to severe stenosis involving the right posterior cerebral artery.2. Additional areas of stenoses involving the right anterior cerebral artery. Please see discussion the body of the report.3. Mild stenosis involving the origin of the left superior terminal branch of the left MCA at the MCA bifurcation.    CTA NECK w/ IV cont (9/27/19):No focal stenosis, major vessel occlusion, aneurysm or dissection of the bilateral vertebral, common carotid, and internal carotid arteries.2. Ascending aortic aneurysm measuring up to 4.7 cm.

## 2019-09-30 NOTE — DISCHARGE NOTE PROVIDER - NSDCQMSTROKERISK_NEU_ALL_CORE
High blood pressure/History of a stroke or TIA High blood pressure/History of a stroke or TIA/Diabetes

## 2019-09-30 NOTE — CONSULT NOTE ADULT - ASSESSMENT
Possible recrudescence. L. M1 stenosis; however, may be symptomatic. Must evaluate for current ischemia.     Impression:    Recrudescence vs. L. MCA territory ischemia.     Plan:  [] Aggressively monitor for hypotension. permissive HTN for 24 hours up to 220/110  [] IVF to maintain BP  [] telemetry monitoring    Medications  [] ASA 81 qD  [] Atorvastatin 80 qD  [] DVT ppx  [] Continue home medications    Imaging   [] CTA H/N Now to evaluate stenosis.  [] STAT repeat CTH if change in neuro status    Labs   [] Lipid panel  [] HbA1C    Follow-up  [] f/u outpatient with Екатерина Schneider Stroke NP, within 1 week of discharge.    - Please email patient contact information to [email or web address].    - [facility] [place] @ [address] [place] [address], [address] [place]; [phone]
ASSESSMENT    70 yo Female with functional deficits after acute CVA and symptomatic intracranial atherosclerosis in the form of hypoperfusion vs artery to artery embolism.    CVA with right sided weakness - as per neurology management, plavix, aspirin  HLD - lipitor  Insomnia - ambien, trazodone  Dementia/Mood disorder - ativan, risperdal, depakote  Pain - Tylenol  DVT PPX - SCDs, Lovenox  Rehab -   Continue bedside therapy as well as OOB throughout the day with mobilization by staff to maintain cardiopulmonary function and prevention of secondary complications related to debility.   PT- ROM, Bed Mobility, transfers, amb w AD, GCEs  OT- ADLs, energy conservation  SLP- Dysphagia eval and tx    Recommend ACUTE inpatient rehabilitation for the functional deficits consisting of 3 hours of therapy/day & 24 hour RN/daily PMR physician for comorbid medical management. Patient will be able to tolerate 3 hours a day.    Will continue to follow for ongoing rehab needs and recommendations. . Functional progress will determine ongoing rehab dispo recommendations, which may change.

## 2019-09-30 NOTE — CONSULT NOTE ADULT - ATTENDING COMMENTS
Pt can return to [facility], as per pt preference, to resume acute rehab once she is medically stable.

## 2019-09-30 NOTE — DISCHARGE NOTE PROVIDER - HOSPITAL COURSE
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 hemiparesis, HTN, HLD, Alcoholism c/b Korsakoff's Dementia, presented with complaints of R. hemiplegia. Patient was noted to have hemiplegia of RUE RLE on the day of admission. At that time, CTH showed NAD, CTA shows L. M1 stenosis unchanged from prior exam. Patient transferred to ICU before being transferred to [facility] for higher level of stroke management.     At [facility] ED, patient presented with R. nasolabial flattening only, NIHSS 1. VSS, labs grossly WNL.         Impression:    Symptomatic intracranial atherosclerosis in the form of hypoperfusion vs artery to artery embolism.         CT head No Cont (9/27/19): No acute intracranial hemorrhage, mass effect, or shift of the midline structures.2. Small age indeterminate infarct within the left corona radiata extending to the left internal capsule.3. Additional chronic right frontal lobe infarct and chronic microvascular disease.    CTA BRAIN w/ IV Cont (9/27/19):Multifocal areas of moderate to severe stenosis involving the right posterior cerebral artery.2. Additional areas of stenoses involving the right anterior cerebral artery. Please see discussion the body of the report.3. Mild stenosis involving the origin of the left superior terminal branch of the left MCA at the MCA bifurcation.    CTA NECK w/ IV cont (9/27/19):No focal stenosis, major vessel occlusion, aneurysm or dissection of the bilateral vertebral, common carotid, and internal carotid arteries.2. Ascending aortic aneurysm measuring up to 4.7 cm.            Discharged on ASA and Plavix for at least 3 months then discontinue Plavix and keep ASA. If she becomes again symptomatic considerations for intracranial stent can be discussed.        Lipid panel LDL 82 continue statin.        HgA1C 6.3 keep tight glycemic control.        Gradual normotension with cautious observation for recurrence of symptoms, goal -140.        PT/OT recommended AR, patient is stable for discharge.  Will follow up with stroke neruologist in 1 week. 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 hemiparesis, HTN, HLD, Alcoholism c/b Korsakoff's Dementia, presented with complaints of R. hemiplegia. Patient was noted to have hemiplegia of RUE RLE on the day of admission. At that time, CTH showed NAD, CTA shows L. M1 stenosis unchanged from prior exam. Patient transferred to ICU before being transferred to [facility] for higher level of stroke management.     At [facility] ED, patient presented with R. nasolabial flattening only, NIHSS 1. VSS, labs grossly WNL.         Impression:    Symptomatic intracranial atherosclerosis in the form of hypoperfusion vs artery to artery embolism.         CT head No Cont (9/27/19): No acute intracranial hemorrhage, mass effect, or shift of the midline structures.2. Small age indeterminate infarct within the left corona radiata extending to the left internal capsule.3. Additional chronic right frontal lobe infarct and chronic microvascular disease.    CTA BRAIN w/ IV Cont (9/27/19):Multifocal areas of moderate to severe stenosis involving the right posterior cerebral artery.2. Additional areas of stenoses involving the right anterior cerebral artery. Please see discussion the body of the report.3. Mild stenosis involving the origin of the left superior terminal branch of the left MCA at the MCA bifurcation.    CTA NECK w/ IV cont (9/27/19):No focal stenosis, major vessel occlusion, aneurysm or dissection of the bilateral vertebral, common carotid, and internal carotid arteries.2. Ascending aortic aneurysm measuring up to 4.7 cm.        Discharged on ASA and Plavix for 3 months then discontinue Plavix and keep ASA. If she becomes again symptomatic considerations for intracranial stent can be discussed.        Lipid panel LDL 82 continue statin.        HgA1C 6.3 keep tight glycemic control.        Gradual normotension with cautious observation for recurrence of symptoms, goal -140.        PT/OT recommended AR, patient is stable for discharge.  Will follow up with stroke neruologist in 1 week.

## 2019-09-30 NOTE — DISCHARGE NOTE PROVIDER - NSDCCPCAREPLAN_GEN_ALL_CORE_FT
PRINCIPAL DISCHARGE DIAGNOSIS  Diagnosis: Stroke  Assessment and Plan of Treatment: Please follow up with neurologist in 1 week. Continue taking medications as prescribed. Monitor your blood pressure. Reduce fat, cholesterol and salt in your diet. Increase intake of fruits and vegetables. Limit alcohol to minimum and do not smoke. You may be at risk for falling, make changes to your home to help you walk easier. Keep up to date on vaccinations.  If you experience any symptoms of facial drooping, slurred speech, arm or leg weakness, severe headache, vision changes or any worsening symptoms, notify provider immediatley and return to ER. PRINCIPAL DISCHARGE DIAGNOSIS  Diagnosis: Stroke  Assessment and Plan of Treatment: Please follow up with neurologist in 1 week. Continue taking medications as prescribed. Monitor your blood pressure. Reduce fat, cholesterol and salt in your diet. Increase intake of fruits and vegetables. Limit alcohol to minimum and do not smoke. You may be at risk for falling, make changes to your home to help you walk easier. Keep up to date on vaccinations.  If you experience any symptoms of facial drooping, slurred speech, arm or leg weakness, severe headache, vision changes or any worsening symptoms, notify provider immediatley and return to ER.      SECONDARY DISCHARGE DIAGNOSES  Diagnosis: Diabetes mellitus  Assessment and Plan of Treatment:

## 2019-09-30 NOTE — DISCHARGE NOTE PROVIDER - CARE PROVIDER_API CALL
Ragini Maloney)  Neurology; Vascular Neurology  [address] [place] [id number]  Phone: [phone]  Fax: [phone]  Follow Up Time:

## 2019-10-01 ENCOUNTER — TRANSCRIPTION ENCOUNTER (OUTPATIENT)
Age: 69
End: 2019-10-01

## 2019-10-01 VITALS
HEART RATE: 96 BPM | TEMPERATURE: 98 F | SYSTOLIC BLOOD PRESSURE: 166 MMHG | RESPIRATION RATE: 18 BRPM | OXYGEN SATURATION: 98 % | DIASTOLIC BLOOD PRESSURE: 90 MMHG

## 2019-10-01 LAB
ANION GAP SERPL CALC-SCNC: 12 MMOL/L — SIGNIFICANT CHANGE UP (ref 5–17)
BUN SERPL-MCNC: 16 MG/DL — SIGNIFICANT CHANGE UP (ref 7–23)
CALCIUM SERPL-MCNC: 9.3 MG/DL — SIGNIFICANT CHANGE UP (ref 8.4–10.5)
CHLORIDE SERPL-SCNC: 104 MMOL/L — SIGNIFICANT CHANGE UP (ref 96–108)
CO2 SERPL-SCNC: 22 MMOL/L — SIGNIFICANT CHANGE UP (ref 22–31)
CREAT SERPL-MCNC: 0.58 MG/DL — SIGNIFICANT CHANGE UP (ref 0.5–1.3)
GLUCOSE BLDC GLUCOMTR-MCNC: 152 MG/DL — HIGH (ref 70–99)
GLUCOSE BLDC GLUCOMTR-MCNC: 179 MG/DL — HIGH (ref 70–99)
GLUCOSE BLDC GLUCOMTR-MCNC: 239 MG/DL — HIGH (ref 70–99)
GLUCOSE SERPL-MCNC: 145 MG/DL — HIGH (ref 70–99)
HCT VFR BLD CALC: 41.7 % — SIGNIFICANT CHANGE UP (ref 34.5–45)
HGB BLD-MCNC: 13.4 G/DL — SIGNIFICANT CHANGE UP (ref 11.5–15.5)
MCHC RBC-ENTMCNC: 32.1 GM/DL — SIGNIFICANT CHANGE UP (ref 32–36)
MCHC RBC-ENTMCNC: 32.4 PG — SIGNIFICANT CHANGE UP (ref 27–34)
MCV RBC AUTO: 101 FL — HIGH (ref 80–100)
PLATELET # BLD AUTO: 226 K/UL — SIGNIFICANT CHANGE UP (ref 150–400)
POTASSIUM SERPL-MCNC: 3.9 MMOL/L — SIGNIFICANT CHANGE UP (ref 3.5–5.3)
POTASSIUM SERPL-SCNC: 3.9 MMOL/L — SIGNIFICANT CHANGE UP (ref 3.5–5.3)
RBC # BLD: 4.13 M/UL — SIGNIFICANT CHANGE UP (ref 3.8–5.2)
RBC # FLD: 12.7 % — SIGNIFICANT CHANGE UP (ref 10.3–14.5)
SODIUM SERPL-SCNC: 138 MMOL/L — SIGNIFICANT CHANGE UP (ref 135–145)
WBC # BLD: 6.72 K/UL — SIGNIFICANT CHANGE UP (ref 3.8–10.5)
WBC # FLD AUTO: 6.72 K/UL — SIGNIFICANT CHANGE UP (ref 3.8–10.5)

## 2019-10-01 PROCEDURE — 99233 SBSQ HOSP IP/OBS HIGH 50: CPT

## 2019-10-01 PROCEDURE — 99238 HOSP IP/OBS DSCHRG MGMT 30/<: CPT

## 2019-10-01 RX ORDER — ACETAMINOPHEN 500 MG
2 TABLET ORAL
Qty: 0 | Refills: 0 | DISCHARGE

## 2019-10-01 RX ORDER — ATORVASTATIN CALCIUM 80 MG/1
1 TABLET, FILM COATED ORAL
Qty: 0 | Refills: 0 | DISCHARGE
Start: 2019-10-01

## 2019-10-01 RX ORDER — HYDRALAZINE HCL 50 MG
1 TABLET ORAL
Qty: 0 | Refills: 0 | DISCHARGE
Start: 2019-10-01

## 2019-10-01 RX ORDER — HYDRALAZINE HCL 50 MG
10 TABLET ORAL
Refills: 0 | Status: DISCONTINUED | OUTPATIENT
Start: 2019-10-01 | End: 2019-10-01

## 2019-10-01 RX ORDER — LISINOPRIL 2.5 MG/1
1 TABLET ORAL
Qty: 0 | Refills: 0 | DISCHARGE
Start: 2019-10-01

## 2019-10-01 RX ORDER — CLOPIDOGREL BISULFATE 75 MG/1
1 TABLET, FILM COATED ORAL
Qty: 0 | Refills: 0 | DISCHARGE
Start: 2019-10-01

## 2019-10-01 RX ORDER — ASPIRIN/CALCIUM CARB/MAGNESIUM 324 MG
1 TABLET ORAL
Qty: 0 | Refills: 0 | DISCHARGE

## 2019-10-01 RX ORDER — LISINOPRIL 2.5 MG/1
1 TABLET ORAL
Qty: 0 | Refills: 0 | DISCHARGE

## 2019-10-01 RX ORDER — SIMVASTATIN 20 MG/1
1 TABLET, FILM COATED ORAL
Qty: 0 | Refills: 0 | DISCHARGE

## 2019-10-01 RX ADMIN — Medication 2: at 13:15

## 2019-10-01 RX ADMIN — DIVALPROEX SODIUM 500 MILLIGRAM(S): 500 TABLET, DELAYED RELEASE ORAL at 05:34

## 2019-10-01 RX ADMIN — CLOPIDOGREL BISULFATE 75 MILLIGRAM(S): 75 TABLET, FILM COATED ORAL at 11:22

## 2019-10-01 RX ADMIN — ENOXAPARIN SODIUM 40 MILLIGRAM(S): 100 INJECTION SUBCUTANEOUS at 11:22

## 2019-10-01 RX ADMIN — Medication 100 MILLIGRAM(S): at 11:22

## 2019-10-01 RX ADMIN — RISPERIDONE 0.25 MILLIGRAM(S): 4 TABLET ORAL at 11:22

## 2019-10-01 RX ADMIN — LISINOPRIL 40 MILLIGRAM(S): 2.5 TABLET ORAL at 05:34

## 2019-10-01 RX ADMIN — Medication 0.5 MILLIGRAM(S): at 05:34

## 2019-10-01 RX ADMIN — Medication 81 MILLIGRAM(S): at 11:22

## 2019-10-01 RX ADMIN — Medication 1: at 06:22

## 2019-10-01 RX ADMIN — Medication 0.5 MILLIGRAM(S): at 11:22

## 2019-10-01 NOTE — PROGRESS NOTE ADULT - SUBJECTIVE AND OBJECTIVE BOX
THE PATIENT WAS SEEN AND EXAMINED BY ME WITH THE HOUSESTAFF AND STROKE TEAM DURING MORNING ROUNDS.   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 hemiparesis, HTN, HLD, Alcoholism c/b Korsakoff's Dementia, presented to the ED with complaints of R. hemiplegia. Patient was noted to have hemiplegia of RUE RLE on the day of admission at 12pm, LKN. At that time, CTH showed NAD, CTA shows L. M1 stenosis unchanged from prior exam. Patient transferred to ICU before being transferred to [facility] for higher level of stroke management. At [facility] ED, patient presented with R. nasolabial flattening only, NIHSS 1. VSS, labs grossly WNL. Denied fevers, chills, ns, cp, sob, abd pain, n/v/d/c, or changes to weight or senses.      SUBJECTIVE: No events overnight.  No new neurologic complaints.      acetaminophen   Tablet .. 650 milliGRAM(s) Oral every 6 hours PRN  aspirin  chewable 81 milliGRAM(s) Oral daily  atorvastatin 80 milliGRAM(s) Oral at bedtime  clopidogrel Tablet 75 milliGRAM(s) Oral daily  dextrose 40% Gel 15 Gram(s) Oral once PRN  dextrose 5%. 1000 milliLiter(s) IV Continuous <Continuous>  dextrose 50% Injectable 12.5 Gram(s) IV Push once  diVALproex Sprinkle 500 milliGRAM(s) Oral two times a day  docusate sodium 100 milliGRAM(s) Oral daily  enoxaparin Injectable 40 milliGRAM(s) SubCutaneous daily  glucagon  Injectable 1 milliGRAM(s) IntraMuscular once PRN  insulin lispro (HumaLOG) corrective regimen sliding scale   SubCutaneous three times a day before meals  insulin lispro (HumaLOG) corrective regimen sliding scale   SubCutaneous at bedtime  lisinopril 40 milliGRAM(s) Oral daily  LORazepam     Tablet 0.5 milliGRAM(s) Oral four times a day  risperiDONE   Tablet 0.25 milliGRAM(s) Oral daily  traZODone 100 milliGRAM(s) Oral at bedtime  zolpidem 5 milliGRAM(s) Oral at bedtime PRN  zolpidem 5 milliGRAM(s) Oral at bedtime PRN      PHYSICAL EXAM:   Vital Signs Last 24 Hrs  T(C): 36.7 (01 Oct 2019 07:36), Max: 37 (30 Sep 2019 12:57)  T(F): 98 (01 Oct 2019 07:36), Max: 98.6 (30 Sep 2019 12:57)  HR: 91 (01 Oct 2019 07:36) (78 - 102)  BP: 173/91 (01 Oct 2019 07:36) (149/76 - 188/81)  BP(mean): --  RR: 20 (01 Oct 2019 07:36) (18 - 20)  SpO2: 98% (01 Oct 2019 07:36) (95% - 98%)    General: No acute distress  HEENT: EOM intact  Abdomen: Soft, nontender, nondistended   Extremities: No edema    NEUROLOGICAL EXAM:  Mental status: Awake, alert, oriented to person, place, and time, speech is fluent with intact naming and repetition  Cranial Nerves: minimal Right nasolabial fold flattening, no nystagmus, no dysarthria, tongue midline  Motor exam: Normal tone, RUE/RLE hemiparesis strength 4/5, LUE/LLE spontaneous movement 5/5 strength  Sensation: Intact to light touch   Coordination/ Gait: No dysmetria, gait deferred     LABS:                        13.9   7.24  )-----------( 232      ( 30 Sep 2019 08:18 )             42.4    10-01    138  |  104  |  16  ----------------------------<  145<H>  3.9   |  22  |  0.58    Ca    9.3      01 Oct 2019 06:33      Hemoglobin A1C, Whole Blood: 6.3 % (09-29 @ 09:38)  Hemoglobin A1C, Whole Blood: 6.3 % (09-28 @ 11:34)  Hemoglobin A1C, Whole Blood: 6.3 % (09-28 @ 11:10)      IMAGING: Reviewed by me.   CT head No Cont (9/27/19): No acute intracranial hemorrhage, mass effect, or shift of the midline structures.2. Small age indeterminate infarct within the left corona radiata extending to the left internal capsule.3. Additional chronic right frontal lobe infarct and chronic microvascular disease.    CTA BRAIN w/ IV Cont (9/27/19):Multifocal areas of moderate to severe stenosis involving the right posterior cerebral artery.2. Additional areas of stenoses involving the right anterior cerebral artery. Please see discussion the body of the report.3. Mild stenosis involving the origin of the left superior terminal branch of the left MCA at the MCA bifurcation.    CTA NECK w/ IV cont (9/27/19):No focal stenosis, major vessel occlusion, aneurysm or dissection of the bilateral vertebral, common carotid, and internal carotid arteries.2. Ascending aortic aneurysm measuring up to 4.7 cm. THE PATIENT WAS SEEN AND EXAMINED BY ME WITH THE HOUSESTAFF AND STROKE TEAM DURING MORNING ROUNDS.   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 hemiparesis, HTN, HLD, Alcoholism c/b Korsakoff's Dementia, presented to the ED with complaints of R. hemiplegia. Patient was noted to have hemiplegia of RUE RLE on the day of admission at 12pm, LKN. At that time, CTH showed NAD, CTA shows L. M1 stenosis unchanged from prior exam. Patient transferred to ICU before being transferred to [facility] for higher level of stroke management. At [facility] ED, patient presented with R. nasolabial flattening only, NIHSS 1. VSS, labs grossly WNL. Denied fevers, chills, ns, cp, sob, abd pain, n/v/d/c, or changes to weight or senses.      SUBJECTIVE: No events overnight.  No new neurologic complaints.      acetaminophen   Tablet .. 650 milliGRAM(s) Oral every 6 hours PRN  aspirin  chewable 81 milliGRAM(s) Oral daily  atorvastatin 80 milliGRAM(s) Oral at bedtime  clopidogrel Tablet 75 milliGRAM(s) Oral daily  dextrose 40% Gel 15 Gram(s) Oral once PRN  dextrose 5%. 1000 milliLiter(s) IV Continuous <Continuous>  dextrose 50% Injectable 12.5 Gram(s) IV Push once  diVALproex Sprinkle 500 milliGRAM(s) Oral two times a day  docusate sodium 100 milliGRAM(s) Oral daily  enoxaparin Injectable 40 milliGRAM(s) SubCutaneous daily  glucagon  Injectable 1 milliGRAM(s) IntraMuscular once PRN  insulin lispro (HumaLOG) corrective regimen sliding scale   SubCutaneous three times a day before meals  insulin lispro (HumaLOG) corrective regimen sliding scale   SubCutaneous at bedtime  lisinopril 40 milliGRAM(s) Oral daily  LORazepam     Tablet 0.5 milliGRAM(s) Oral four times a day  risperiDONE   Tablet 0.25 milliGRAM(s) Oral daily  traZODone 100 milliGRAM(s) Oral at bedtime  zolpidem 5 milliGRAM(s) Oral at bedtime PRN  zolpidem 5 milliGRAM(s) Oral at bedtime PRN      PHYSICAL EXAM:   Vital Signs Last 24 Hrs  T(C): 36.7 (01 Oct 2019 07:36), Max: 37 (30 Sep 2019 12:57)  T(F): 98 (01 Oct 2019 07:36), Max: 98.6 (30 Sep 2019 12:57)  HR: 91 (01 Oct 2019 07:36) (78 - 102)  BP: 173/91 (01 Oct 2019 07:36) (149/76 - 188/81)  BP(mean): --  RR: 20 (01 Oct 2019 07:36) (18 - 20)  SpO2: 98% (01 Oct 2019 07:36) (95% - 98%)    General: No acute distress  HEENT: EOM intact  Abdomen: Soft, nontender, nondistended   Extremities: No edema    NEUROLOGICAL EXAM:  Mental status: Awake, alert, oriented to person, place, and time, speech is fluent with intact naming and repetition, follows cross command  Cranial Nerves: no facial palsy, mild dysarthria, no nystagmus, tongue midline  Motor exam: Normal tone, no RUE drift, mild-moderate postural tremor LUE/RUE, RLE subtle drift, RUE/RLE hemiparesis, strength 4/5 LUE/LLE spontaneous movement 5/5 strength  Sensation: Intact to light touch   Coordination/ Gait: Kinetic tremor RUE/LUE, No dysmetria, gait deferred     LABS:                        13.9   7.24  )-----------( 232      ( 30 Sep 2019 08:18 )             42.4    10-01    138  |  104  |  16  ----------------------------<  145<H>  3.9   |  22  |  0.58    Ca    9.3      01 Oct 2019 06:33      Hemoglobin A1C, Whole Blood: 6.3 % (09-29 @ 09:38)  Hemoglobin A1C, Whole Blood: 6.3 % (09-28 @ 11:34)  Hemoglobin A1C, Whole Blood: 6.3 % (09-28 @ 11:10)      IMAGING: Reviewed by me.   CT head No Cont (9/27/19): No acute intracranial hemorrhage, mass effect, or shift of the midline structures.2. Small age indeterminate infarct within the left corona radiata extending to the left internal capsule.3. Additional chronic right frontal lobe infarct and chronic microvascular disease.    CTA BRAIN w/ IV Cont (9/27/19):Multifocal areas of moderate to severe stenosis involving the right posterior cerebral artery.2. Additional areas of stenoses involving the right anterior cerebral artery. Please see discussion the body of the report.3. Mild stenosis involving the origin of the left superior terminal branch of the left MCA at the MCA bifurcation.    CTA NECK w/ IV cont (9/27/19):No focal stenosis, major vessel occlusion, aneurysm or dissection of the bilateral vertebral, common carotid, and internal carotid arteries.2. Ascending aortic aneurysm measuring up to 4.7 cm. THE PATIENT WAS SEEN AND EXAMINED BY ME WITH THE HOUSESTAFF AND STROKE TEAM DURING MORNING ROUNDS.   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 from 9/23-9/27 w/ residual R. hemiparesis, HTN, HLD, Alcoholism c/b Korsakoff's Dementia, presented to the ED with complaints of R. hemiplegia. Patient was noted to have hemiplegia of RUE RLE on the day of admission at 12pm, LKN. At that time, CTH showed NAD, CTA shows L. M1 stenosis unchanged from prior exam. Patient transferred to ICU before being transferred to [facility] for higher level of stroke management. At [facility] ED, patient presented with R. nasolabial flattening only, NIHSS 1. VSS, labs grossly WNL. Denied fevers, chills, ns, cp, sob, abd pain, n/v/d/c, or changes to weight or senses.      SUBJECTIVE: No events overnight.  No new neurologic complaints.      acetaminophen   Tablet .. 650 milliGRAM(s) Oral every 6 hours PRN  aspirin  chewable 81 milliGRAM(s) Oral daily  atorvastatin 80 milliGRAM(s) Oral at bedtime  clopidogrel Tablet 75 milliGRAM(s) Oral daily  dextrose 40% Gel 15 Gram(s) Oral once PRN  dextrose 5%. 1000 milliLiter(s) IV Continuous <Continuous>  dextrose 50% Injectable 12.5 Gram(s) IV Push once  diVALproex Sprinkle 500 milliGRAM(s) Oral two times a day  docusate sodium 100 milliGRAM(s) Oral daily  enoxaparin Injectable 40 milliGRAM(s) SubCutaneous daily  glucagon  Injectable 1 milliGRAM(s) IntraMuscular once PRN  insulin lispro (HumaLOG) corrective regimen sliding scale   SubCutaneous three times a day before meals  insulin lispro (HumaLOG) corrective regimen sliding scale   SubCutaneous at bedtime  lisinopril 40 milliGRAM(s) Oral daily  LORazepam     Tablet 0.5 milliGRAM(s) Oral four times a day  risperiDONE   Tablet 0.25 milliGRAM(s) Oral daily  traZODone 100 milliGRAM(s) Oral at bedtime  zolpidem 5 milliGRAM(s) Oral at bedtime PRN  zolpidem 5 milliGRAM(s) Oral at bedtime PRN      PHYSICAL EXAM:   Vital Signs Last 24 Hrs  T(C): 36.7 (01 Oct 2019 07:36), Max: 37 (30 Sep 2019 12:57)  T(F): 98 (01 Oct 2019 07:36), Max: 98.6 (30 Sep 2019 12:57)  HR: 91 (01 Oct 2019 07:36) (78 - 102)  BP: 173/91 (01 Oct 2019 07:36) (149/76 - 188/81)  BP(mean): --  RR: 20 (01 Oct 2019 07:36) (18 - 20)  SpO2: 98% (01 Oct 2019 07:36) (95% - 98%)    General: No acute distress  HEENT: EOM intact  Abdomen: Soft, nontender, nondistended   Extremities: No edema    NEUROLOGICAL EXAM:  Mental status: Awake, alert, oriented to person, place, and time, speech is fluent with intact naming and repetition, follows cross command  Cranial Nerves: no facial palsy, mild dysarthria, no nystagmus, tongue midline  Motor exam: Normal tone, no RUE drift, mild-moderate postural tremor LUE/RUE, RLE subtle drift, RUE/RLE hemiparesis, strength 4/5 LUE/LLE spontaneous movement 5/5 strength  Sensation: Intact to light touch   Coordination/ Gait: Kinetic tremor RUE/LUE, No dysmetria, gait deferred     LABS:                        13.4  6.72  )-----------( 226      ( 01 Oct 2019 08:10 )             41.7    10-01    138  |  104  |  16  ----------------------------<  145<H>  3.9   |  22  |  0.58    Ca    9.3      01 Oct 2019 06:33      Hemoglobin A1C, Whole Blood: 6.3 % (09-29 @ 09:38)  Hemoglobin A1C, Whole Blood: 6.3 % (09-28 @ 11:34)  Hemoglobin A1C, Whole Blood: 6.3 % (09-28 @ 11:10)      IMAGING: Reviewed by me.   CT head No Cont (9/27/19): No acute intracranial hemorrhage, mass effect, or shift of the midline structures.2. Small age indeterminate infarct within the left corona radiata extending to the left internal capsule.3. Additional chronic right frontal lobe infarct and chronic microvascular disease.    CTA BRAIN w/ IV Cont (9/27/19):Multifocal areas of moderate to severe stenosis involving the right posterior cerebral artery.2. Additional areas of stenoses involving the right anterior cerebral artery. Please see discussion the body of the report.3. Mild stenosis involving the origin of the left superior terminal branch of the left MCA at the MCA bifurcation.    CTA NECK w/ IV cont (9/27/19):No focal stenosis, major vessel occlusion, aneurysm or dissection of the bilateral vertebral, common carotid, and internal carotid arteries.2. Ascending aortic aneurysm measuring up to 4.7 cm.

## 2019-10-01 NOTE — DISCHARGE NOTE NURSING/CASE MANAGEMENT/SOCIAL WORK - PATIENT PORTAL LINK FT
You can access the FollowMyHealth Patient Portal offered by [facility] by registering at the following website: [email or web address]. By joining [facility]’s FollowMyHealth portal, you will also be able to view your health information using other applications (apps) compatible with our system.

## 2019-10-01 NOTE — PROGRESS NOTE ADULT - ASSESSMENT
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 hemiparesis, HTN, HLD, Alcoholism c/b Korsakoff's Dementia, presented to the ED with complaints of R. hemiplegia. Patient was noted to have hemiplegia of RUE RLE on the day of admission at 12pm, LKN. At that time, CT of the head done at [facility]   no major area of hypodensity identified other that bilateral basal ganglia and left insular chronic ischemic changes noted. Changes of small vessel disease along with age related atrophy evident. Head and neck angiography pertinent for multiple intracranial atherosclerosis. Left M2 superior division area of stenosis noticed.    Impression: Symptomatic intracranial atherosclerosis in the form of hypoperfusion vs artery to artery embolism.    NEURO: Neurologically without acute change. Continue monitoring for neurologic deterioration, permissive HTN to gradual normotension as tolerated, LDL 82 will discharge on home statin. Physical therapy/OT/ recommend AR.   ANTITHROMBOTIC THERAPY: Aspirin and Plavix for 3 months and then aspirin alone; P2Y12 103     PULMONARY: Protecting airway, saturating well on room air    CARDIOVASCULAR: check TTE can be done outpatient, cardiac monitoring without any recorded events. Lisinopril 20mg restarted systolic 188 will continue medication titration  SBP goal:    140-170mmhg goal for now and gradual normotension there after     GASTROINTESTINAL: dysphagia screen passed, tolerating diet.   Diet: Regular     RENAL: BUN/Cr without acute change, maintain adequate hydration   Na Goal: 145-150, avoid rapid fluctuations gradual titration   Fowler: No    HEMATOLOGY: H/H without acute change, Platelets 232, no signs or symptoms of bleeding.   DVT ppx: LMWH    ID: afebrile, no leukocytosis. No signs or symptoms of infection.     OTHER: All questions and concerns addressed to patient at bedside. Spoke with brother in person   and explained plan including keeping her BP permissive for now then slowly bringing it down with a goal of 110-140 systolic over time, all questions answered and concerns addressed.     DISPOSITION: Acute rehab once stabilized and workup is complete.     CORE MEASURES:   Admission NIHSS: 1  TPA: [] YES [x] NO   LDL/HDL: 82/58  Depression Screen: 0   Statin Therapy: y   Dysphagia Screen: [x] PASS [] FAIL  Smoking [] YES [x] NO   Afib [] YES [x] NO  Stroke Education [x] YES [] NO    Obtain screening lower extremity venous ultrasound in patients who meet 1 or more of the following criteria as patient is high risk for DVT/PE on admission:   [] History of DVT/PE  []Hypercoagulable states (Factor V Leiden, Cancer, OCP, etc. 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 hemiparesis, HTN, HLD, Alcoholism c/b Korsakoff's Dementia, presented to the ED with complaints of R. hemiplegia. Patient was noted to have hemiplegia of RUE RLE on the day of admission at 12pm, LKN. At that time, CT of the head done at [facility]   no major area of hypodensity identified other that bilateral basal ganglia and left insular chronic ischemic changes noted. Changes of small vessel disease along with age related atrophy evident. Head and neck angiography pertinent for multiple intracranial atherosclerosis. Left M2 superior division area of stenosis noticed.    Impression: Symptomatic intracranial atherosclerosis in the form of hypoperfusion vs artery to artery embolism.    NEURO: Neurologically without acute change. Continue monitoring for neurologic deterioration, permissive HTN to gradual normotension as tolerated, LDL 82 will discharge on home statin. Physical therapy/OT/ recommend AR.   ANTITHROMBOTIC THERAPY: Aspirin and Plavix for 3 months and then aspirin alone; P2Y12 103     PULMONARY: Protecting airway, saturating well on room air    CARDIOVASCULAR: check TTE can be done outpatient, cardiac monitoring without any recorded events. Lisinopril 40mg restarted systolic 188 will continue medication titration  SBP goal: 140-170mmhg goal for now and gradual normotension there after     GASTROINTESTINAL: dysphagia screen passed, tolerating diet.   Diet: Regular     RENAL: BUN/Cr without acute change, maintain adequate hydration   Na Goal: 145-150, avoid rapid fluctuations gradual titration   Fowler: No    HEMATOLOGY: H/H without acute change, Platelets 232, no signs or symptoms of bleeding.   DVT ppx: LMWH    ID: afebrile, no leukocytosis. No signs or symptoms of infection.     OTHER: All questions and concerns addressed to patient at bedside. Spoke with brother in person   and explained plan including keeping her BP permissive for now then slowly bringing it down with a goal of 110-140 systolic over time, all questions answered and concerns addressed.     DISPOSITION: Acute rehab once stabilized and workup is complete.     CORE MEASURES:   Admission NIHSS: 1  TPA: [] YES [x] NO   LDL/HDL: 82/58  Depression Screen: 0   Statin Therapy: y   Dysphagia Screen: [x] PASS [] FAIL  Smoking [] YES [x] NO   Afib [] YES [x] NO  Stroke Education [x] YES [] NO    Obtain screening lower extremity venous ultrasound in patients who meet 1 or more of the following criteria as patient is high risk for DVT/PE on admission:   [] History of DVT/PE  []Hypercoagulable states (Factor V Leiden, Cancer, OCP, etc. 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 hemiparesis, HTN, HLD, Alcoholism c/b Korsakoff's Dementia, presented to the ED with complaints of R. hemiplegia. Patient was noted to have hemiplegia of RUE RLE on the day of admission at 12pm, LKN. At that time, CT of the head done at [facility]   no major area of hypodensity identified other that bilateral basal ganglia and left insular chronic ischemic changes noted. Changes of small vessel disease along with age related atrophy evident. Head and neck angiography pertinent for multiple intracranial atherosclerosis. Left M2 superior division area of stenosis noticed.    Impression: Symptomatic intracranial atherosclerosis in the form of hypoperfusion vs artery to artery embolism.    NEURO: Neurologically without acute change. Continue monitoring for neurologic deterioration, permissive HTN to gradual normotension as tolerated, LDL 82 will discharge on home statin. Physical therapy/OT/ recommend AR.   ANTITHROMBOTIC THERAPY: Aspirin and Plavix for 3 months and then aspirin alone; P2Y12 103     PULMONARY: Protecting airway, saturating well on room air    CARDIOVASCULAR: check TTE can be done outpatient, cardiac monitoring without any recorded events. Lisinopril 40mg restarted systolic 188 will continue medication titration  SBP goal: 140-170mmhg goal for now and gradual normotension there after     GASTROINTESTINAL: dysphagia screen passed, tolerating diet.   Diet: Regular     RENAL: BUN/Cr without acute change, maintain adequate hydration   Na Goal: 145-150, avoid rapid fluctuations gradual titration   Fowler: No    HEMATOLOGY: H/H without acute change, Platelets 226, no signs or symptoms of bleeding.   DVT ppx: LMWH    ID: afebrile, no leukocytosis. No signs or symptoms of infection.     OTHER: All questions and concerns addressed to patient at bedside. Spoke with brother in person   and explained plan including keeping her BP permissive for now then slowly bringing it down with a goal of 110-140 systolic over time, all questions answered and concerns addressed.     DISPOSITION: Acute rehab once stabilized and workup is complete.     CORE MEASURES:   Admission NIHSS: 1  TPA: [] YES [x] NO   LDL/HDL: 82/58  Depression Screen: 0   Statin Therapy: y   Dysphagia Screen: [x] PASS [] FAIL  Smoking [] YES [x] NO   Afib [] YES [x] NO  Stroke Education [x] YES [] NO    Obtain screening lower extremity venous ultrasound in patients who meet 1 or more of the following criteria as patient is high risk for DVT/PE on admission:   [] History of DVT/PE  []Hypercoagulable states (Factor V Leiden, Cancer, OCP, etc. )  []Prolonged immobility (hemiplegia/hemiparesis/post operative or any other extended immobilization)  [] Transferred from outside facility (Rehab or Long term care)  [] Age </= to 50

## 2019-10-01 NOTE — DISCHARGE NOTE NURSING/CASE MANAGEMENT/SOCIAL WORK - NSDCPEPTSTRK_GEN_ALL_CORE
Stroke education booklet/Stroke support groups for patients, families, and friends/Risk factors for stroke/Call 911 for stroke/Signs and symptoms of stroke/Prescribed medications/Stroke warning signs and symptoms/Need for follow up after discharge

## 2019-11-01 ENCOUNTER — EMERGENCY (EMERGENCY)
Facility: HOSPITAL | Age: 69
LOS: 1 days | Discharge: HOME | End: 2019-11-01
Attending: INTERNAL MEDICINE | Admitting: INTERNAL MEDICINE
Payer: MEDICARE

## 2019-11-01 VITALS
OXYGEN SATURATION: 96 % | HEART RATE: 104 BPM | TEMPERATURE: 98 F | DIASTOLIC BLOOD PRESSURE: 62 MMHG | SYSTOLIC BLOOD PRESSURE: 102 MMHG | RESPIRATION RATE: 16 BRPM

## 2019-11-01 VITALS
DIASTOLIC BLOOD PRESSURE: 87 MMHG | RESPIRATION RATE: 16 BRPM | OXYGEN SATURATION: 100 % | TEMPERATURE: 97 F | HEIGHT: 62 IN | HEART RATE: 108 BPM | WEIGHT: 138.89 LBS | SYSTOLIC BLOOD PRESSURE: 121 MMHG

## 2019-11-01 LAB
ALBUMIN SERPL ELPH-MCNC: 2.7 G/DL — LOW (ref 3.3–5)
ALP SERPL-CCNC: 44 U/L — SIGNIFICANT CHANGE UP (ref 40–120)
ALT FLD-CCNC: 24 U/L — SIGNIFICANT CHANGE UP (ref 10–45)
ANION GAP SERPL CALC-SCNC: 8 MMOL/L — SIGNIFICANT CHANGE UP (ref 5–17)
APTT BLD: 37.4 SEC — HIGH (ref 27.5–36.3)
AST SERPL-CCNC: 18 U/L — SIGNIFICANT CHANGE UP (ref 10–40)
BILIRUB SERPL-MCNC: 0.5 MG/DL — SIGNIFICANT CHANGE UP (ref 0.2–1.2)
BUN SERPL-MCNC: 15 MG/DL — SIGNIFICANT CHANGE UP (ref 7–23)
CALCIUM SERPL-MCNC: 8.8 MG/DL — SIGNIFICANT CHANGE UP (ref 8.4–10.5)
CHLORIDE SERPL-SCNC: 106 MMOL/L — SIGNIFICANT CHANGE UP (ref 96–108)
CO2 SERPL-SCNC: 27 MMOL/L — SIGNIFICANT CHANGE UP (ref 22–31)
CREAT SERPL-MCNC: 0.55 MG/DL — SIGNIFICANT CHANGE UP (ref 0.5–1.3)
GLUCOSE SERPL-MCNC: 163 MG/DL — HIGH (ref 70–99)
HCT VFR BLD CALC: 31.2 % — LOW (ref 34.5–45)
HGB BLD-MCNC: 10.3 G/DL — LOW (ref 11.5–15.5)
INR BLD: 1.07 RATIO — SIGNIFICANT CHANGE UP (ref 0.88–1.16)
MCHC RBC-ENTMCNC: 33 GM/DL — SIGNIFICANT CHANGE UP (ref 32–36)
MCHC RBC-ENTMCNC: 34.6 PG — HIGH (ref 27–34)
MCV RBC AUTO: 104.7 FL — HIGH (ref 80–100)
NRBC # BLD: 0 /100 WBCS — SIGNIFICANT CHANGE UP (ref 0–0)
PLATELET # BLD AUTO: 188 K/UL — SIGNIFICANT CHANGE UP (ref 150–400)
POTASSIUM SERPL-MCNC: 4 MMOL/L — SIGNIFICANT CHANGE UP (ref 3.5–5.3)
POTASSIUM SERPL-SCNC: 4 MMOL/L — SIGNIFICANT CHANGE UP (ref 3.5–5.3)
PROT SERPL-MCNC: 5.9 G/DL — LOW (ref 6–8.3)
PROTHROM AB SERPL-ACNC: 12 SEC — SIGNIFICANT CHANGE UP (ref 10–12.9)
RBC # BLD: 2.98 M/UL — LOW (ref 3.8–5.2)
RBC # FLD: 14.6 % — HIGH (ref 10.3–14.5)
SODIUM SERPL-SCNC: 141 MMOL/L — SIGNIFICANT CHANGE UP (ref 135–145)
TROPONIN I SERPL-MCNC: <.017 NG/ML — LOW (ref 0.02–0.06)
WBC # BLD: 8.64 K/UL — SIGNIFICANT CHANGE UP (ref 3.8–10.5)
WBC # FLD AUTO: 8.64 K/UL — SIGNIFICANT CHANGE UP (ref 3.8–10.5)

## 2019-11-01 PROCEDURE — 71045 X-RAY EXAM CHEST 1 VIEW: CPT | Mod: 26

## 2019-11-01 PROCEDURE — 71045 X-RAY EXAM CHEST 1 VIEW: CPT

## 2019-11-01 PROCEDURE — 80053 COMPREHEN METABOLIC PANEL: CPT

## 2019-11-01 PROCEDURE — 85027 COMPLETE CBC AUTOMATED: CPT

## 2019-11-01 PROCEDURE — 73552 X-RAY EXAM OF FEMUR 2/>: CPT | Mod: 26,RT

## 2019-11-01 PROCEDURE — 84484 ASSAY OF TROPONIN QUANT: CPT

## 2019-11-01 PROCEDURE — 85610 PROTHROMBIN TIME: CPT

## 2019-11-01 PROCEDURE — 93005 ELECTROCARDIOGRAM TRACING: CPT

## 2019-11-01 PROCEDURE — 93010 ELECTROCARDIOGRAM REPORT: CPT

## 2019-11-01 PROCEDURE — 85730 THROMBOPLASTIN TIME PARTIAL: CPT

## 2019-11-01 PROCEDURE — 72170 X-RAY EXAM OF PELVIS: CPT | Mod: 26

## 2019-11-01 PROCEDURE — 72170 X-RAY EXAM OF PELVIS: CPT

## 2019-11-01 PROCEDURE — 73552 X-RAY EXAM OF FEMUR 2/>: CPT

## 2019-11-01 PROCEDURE — 99284 EMERGENCY DEPT VISIT MOD MDM: CPT | Mod: 25

## 2019-11-01 PROCEDURE — 36415 COLL VENOUS BLD VENIPUNCTURE: CPT

## 2019-11-01 PROCEDURE — 99285 EMERGENCY DEPT VISIT HI MDM: CPT

## 2019-11-01 RX ORDER — SODIUM CHLORIDE 9 MG/ML
1000 INJECTION INTRAMUSCULAR; INTRAVENOUS; SUBCUTANEOUS
Refills: 0 | Status: DISCONTINUED | OUTPATIENT
Start: 2019-11-01 | End: 2019-11-16

## 2019-11-01 RX ADMIN — SODIUM CHLORIDE 1000 MILLILITER(S): 9 INJECTION INTRAMUSCULAR; INTRAVENOUS; SUBCUTANEOUS at 11:51

## 2019-11-01 NOTE — ED PROVIDER NOTE - PMH
Alcohol dependence    Aphasia    Atherosclerotic cardiovascular disease    Bipolar 1 disorder    Cerebral infarction    Dementia    Dementia    Diabetes type 2, controlled    Hyperlipidemia    Hypertension    Insomnia

## 2019-11-01 NOTE — ED PROVIDER NOTE - CARE PROVIDER_API CALL
Marvin Gibbs)  Orthopaedic Surgery  [address], [address]  [place], [place] [id number]  Phone: [phone]  Fax: [phone]  Follow Up Time:

## 2019-11-01 NOTE — ED PROVIDER NOTE - PHYSICAL EXAMINATION
+ ecchymosis , tenderness proximal thigh area , painless marry R hip neuro vs R LL intact + ecchymosis , tenderness proximal thigh area , painless rom R hip neuro vs R LL intact

## 2019-11-01 NOTE — ED PROVIDER NOTE - OBJECTIVE STATEMENT
fell pain swelling R proximal thigh fell pain swelling R proximal thigh on anticogulation for PE hx of stroke koroskoff psychosis . psychiatric condition

## 2019-11-01 NOTE — ED ADULT NURSE NOTE - CHIEF COMPLAINT QUOTE
Sent from [facility] for fall. As per staff, pt fell out of bed and helped herself back in, presents with bruising to right thigh and abdomen. Right leg splinted by EMS

## 2019-11-01 NOTE — ED ADULT NURSE NOTE - OBJECTIVE STATEMENT
Patient to the unit for Perjeta and Herceptin infusions  Tolerated both infusions without adverse reaction  Declined AVS   Aware of future appointment  Voices no questions or concerns at discharge  Pt arrives to Er brought in by ambulance from [facility]. As per ems, staff states pt fell out of bed earlier this morning and was able to put herself back into bed. Pt presents with hematoma to right thigh with a full right lower extremity splint placed by EMS and bruising to abdomen. Pt has old bruises to bilateral hands and lower abdomen. Pt is alert but disoriented, hx of dementia, denies pain at rest, c/o pain when right thigh is palpated. Pt denies any other injuries at this time.

## 2019-11-01 NOTE — ED PROVIDER NOTE - CARE PLAN
Dental abscess Principal Discharge DX:	Hematoma of thigh, initial encounter  Secondary Diagnosis:	Accidental fall, initial encounter

## 2019-11-01 NOTE — ED ADULT NURSE NOTE - NSIMPLEMENTINTERV_GEN_ALL_ED
Implemented All Fall Risk Interventions:  Peerless to call system. Call bell, personal items and telephone within reach. Instruct patient to call for assistance. Room bathroom lighting operational. Non-slip footwear when patient is off stretcher. Physically safe environment: no spills, clutter or unnecessary equipment. Stretcher in lowest position, wheels locked, appropriate side rails in place. Provide visual cue, wrist band, yellow gown, etc. Monitor gait and stability. Monitor for mental status changes and reorient to person, place, and time. Review medications for side effects contributing to fall risk. Reinforce activity limits and safety measures with patient and family.

## 2019-11-01 NOTE — ED PROVIDER NOTE - PATIENT PORTAL LINK FT
You can access the FollowMyHealth Patient Portal offered by [facility] by registering at the following website: [email or web address]. By joining [facility]’s FollowMyHealth portal, you will also be able to view your health information using other applications (apps) compatible with our system.

## 2019-11-01 NOTE — ED ADULT TRIAGE NOTE - CHIEF COMPLAINT QUOTE
Sent from [facility] for fall. As per staff, pt fell out of bed and helped herself back in, presents with bruising to right thigh and abdomen. Right leg splinted by EMS

## 2019-11-01 NOTE — ED PROVIDER NOTE - NSFOLLOWUPINSTRUCTIONS_ED_ALL_ED_FT
ice tylenol  Rest, drink plenty of fluids  Advance activity as tolerated  Continue all previously prescribed medications as directed  Follow up with your PMD 2-3 days- bring copies of your results  Return to the ER for worsening

## 2019-11-09 ENCOUNTER — INPATIENT (INPATIENT)
Facility: HOSPITAL | Age: 69
LOS: 4 days | Discharge: NURSING, REHAB OR LONG-TERM CARE | DRG: 604 | End: 2019-11-14
Attending: SURGERY | Admitting: SURGERY
Payer: MEDICARE

## 2019-11-09 VITALS
HEART RATE: 114 BPM | WEIGHT: 139.99 LBS | SYSTOLIC BLOOD PRESSURE: 140 MMHG | RESPIRATION RATE: 18 BRPM | OXYGEN SATURATION: 98 % | DIASTOLIC BLOOD PRESSURE: 82 MMHG | TEMPERATURE: 98 F | HEIGHT: 64 IN

## 2019-11-09 DIAGNOSIS — D62 ACUTE POSTHEMORRHAGIC ANEMIA: ICD-10-CM

## 2019-11-09 LAB
ACANTHOCYTES BLD QL SMEAR: SLIGHT — SIGNIFICANT CHANGE UP
ALBUMIN SERPL ELPH-MCNC: 3.5 G/DL — SIGNIFICANT CHANGE UP (ref 3.3–5)
ALP SERPL-CCNC: 40 U/L — SIGNIFICANT CHANGE UP (ref 40–120)
ALT FLD-CCNC: 16 U/L — SIGNIFICANT CHANGE UP (ref 10–45)
AMMONIA BLD-MCNC: 26 UMOL/L — SIGNIFICANT CHANGE UP (ref 11–55)
ANION GAP SERPL CALC-SCNC: 14 MMOL/L — SIGNIFICANT CHANGE UP (ref 5–17)
ANISOCYTOSIS BLD QL: SLIGHT — SIGNIFICANT CHANGE UP
APPEARANCE UR: CLEAR — SIGNIFICANT CHANGE UP
APTT BLD: 39.5 SEC — HIGH (ref 27.5–36.3)
AST SERPL-CCNC: 40 U/L — SIGNIFICANT CHANGE UP (ref 10–40)
BACTERIA # UR AUTO: NEGATIVE — SIGNIFICANT CHANGE UP
BASE EXCESS BLDV CALC-SCNC: 3.1 MMOL/L — HIGH (ref -2–2)
BASOPHILS # BLD AUTO: 0 K/UL — SIGNIFICANT CHANGE UP (ref 0–0.2)
BASOPHILS NFR BLD AUTO: 0 % — SIGNIFICANT CHANGE UP (ref 0–2)
BILIRUB SERPL-MCNC: 1.9 MG/DL — HIGH (ref 0.2–1.2)
BILIRUB UR-MCNC: NEGATIVE — SIGNIFICANT CHANGE UP
BLD GP AB SCN SERPL QL: NEGATIVE — SIGNIFICANT CHANGE UP
BUN SERPL-MCNC: 16 MG/DL — SIGNIFICANT CHANGE UP (ref 7–23)
CA-I SERPL-SCNC: 1.23 MMOL/L — SIGNIFICANT CHANGE UP (ref 1.12–1.3)
CALCIUM SERPL-MCNC: 9.8 MG/DL — SIGNIFICANT CHANGE UP (ref 8.4–10.5)
CHLORIDE BLDV-SCNC: 99 MMOL/L — SIGNIFICANT CHANGE UP (ref 96–108)
CHLORIDE SERPL-SCNC: 98 MMOL/L — SIGNIFICANT CHANGE UP (ref 96–108)
CO2 BLDV-SCNC: 30 MMOL/L — SIGNIFICANT CHANGE UP (ref 22–30)
CO2 SERPL-SCNC: 24 MMOL/L — SIGNIFICANT CHANGE UP (ref 22–31)
COLOR SPEC: YELLOW — SIGNIFICANT CHANGE UP
CREAT SERPL-MCNC: 0.46 MG/DL — LOW (ref 0.5–1.3)
DIFF PNL FLD: NEGATIVE — SIGNIFICANT CHANGE UP
ELLIPTOCYTES BLD QL SMEAR: SLIGHT — SIGNIFICANT CHANGE UP
EOSINOPHIL # BLD AUTO: 0.1 K/UL — SIGNIFICANT CHANGE UP (ref 0–0.5)
EOSINOPHIL NFR BLD AUTO: 1 % — SIGNIFICANT CHANGE UP (ref 0–6)
EPI CELLS # UR: 6 /HPF — HIGH
ETHANOL SERPL-MCNC: SIGNIFICANT CHANGE UP MG/DL (ref 0–10)
GAS PNL BLDV: 136 MMOL/L — SIGNIFICANT CHANGE UP (ref 135–145)
GAS PNL BLDV: SIGNIFICANT CHANGE UP
GLUCOSE BLDV-MCNC: 112 MG/DL — HIGH (ref 70–99)
GLUCOSE SERPL-MCNC: 132 MG/DL — HIGH (ref 70–99)
GLUCOSE UR QL: NEGATIVE — SIGNIFICANT CHANGE UP
HCO3 BLDV-SCNC: 28 MMOL/L — SIGNIFICANT CHANGE UP (ref 21–29)
HCT VFR BLD CALC: 21 % — CRITICAL LOW (ref 34.5–45)
HCT VFR BLD CALC: 24 % — LOW (ref 34.5–45)
HCT VFR BLD CALC: 24.4 % — LOW (ref 34.5–45)
HCT VFR BLD CALC: 25 % — LOW (ref 34.5–45)
HCT VFR BLDA CALC: 25 % — LOW (ref 39–50)
HGB BLD CALC-MCNC: 8.2 G/DL — LOW (ref 11.5–15.5)
HGB BLD-MCNC: 6.6 G/DL — CRITICAL LOW (ref 11.5–15.5)
HGB BLD-MCNC: 7.8 G/DL — LOW (ref 11.5–15.5)
HGB BLD-MCNC: 8 G/DL — LOW (ref 11.5–15.5)
HGB BLD-MCNC: 8.1 G/DL — LOW (ref 11.5–15.5)
HYALINE CASTS # UR AUTO: 2 /LPF — SIGNIFICANT CHANGE UP (ref 0–2)
HYPOCHROMIA BLD QL: SLIGHT — SIGNIFICANT CHANGE UP
INR BLD: 1 RATIO — SIGNIFICANT CHANGE UP (ref 0.88–1.16)
KETONES UR-MCNC: ABNORMAL
LACTATE BLDV-MCNC: 3.7 MMOL/L — HIGH (ref 0.7–2)
LEUKOCYTE ESTERASE UR-ACNC: NEGATIVE — SIGNIFICANT CHANGE UP
LIDOCAIN IGE QN: 23 U/L — SIGNIFICANT CHANGE UP (ref 7–60)
LYMPHOCYTES # BLD AUTO: 2.07 K/UL — SIGNIFICANT CHANGE UP (ref 1–3.3)
LYMPHOCYTES # BLD AUTO: 20 % — SIGNIFICANT CHANGE UP (ref 13–44)
MACROCYTES BLD QL: SIGNIFICANT CHANGE UP
MANUAL SMEAR VERIFICATION: SIGNIFICANT CHANGE UP
MCHC RBC-ENTMCNC: 31.4 GM/DL — LOW (ref 32–36)
MCHC RBC-ENTMCNC: 32 GM/DL — SIGNIFICANT CHANGE UP (ref 32–36)
MCHC RBC-ENTMCNC: 32.4 GM/DL — SIGNIFICANT CHANGE UP (ref 32–36)
MCHC RBC-ENTMCNC: 33.3 GM/DL — SIGNIFICANT CHANGE UP (ref 32–36)
MCHC RBC-ENTMCNC: 33.6 PG — SIGNIFICANT CHANGE UP (ref 27–34)
MCHC RBC-ENTMCNC: 34.5 PG — HIGH (ref 27–34)
MCHC RBC-ENTMCNC: 34.9 PG — HIGH (ref 27–34)
MCHC RBC-ENTMCNC: 35.5 PG — HIGH (ref 27–34)
MCV RBC AUTO: 104.8 FL — HIGH (ref 80–100)
MCV RBC AUTO: 105.2 FL — HIGH (ref 80–100)
MCV RBC AUTO: 106.4 FL — HIGH (ref 80–100)
MCV RBC AUTO: 112.9 FL — HIGH (ref 80–100)
METAMYELOCYTES # FLD: 1 % — HIGH (ref 0–0)
MONOCYTES # BLD AUTO: 1.04 K/UL — HIGH (ref 0–0.9)
MONOCYTES NFR BLD AUTO: 10 % — SIGNIFICANT CHANGE UP (ref 2–14)
NEUTROPHILS # BLD AUTO: 7.04 K/UL — SIGNIFICANT CHANGE UP (ref 1.8–7.4)
NEUTROPHILS NFR BLD AUTO: 64 % — SIGNIFICANT CHANGE UP (ref 43–77)
NEUTS BAND # BLD: 4 % — SIGNIFICANT CHANGE UP (ref 0–8)
NITRITE UR-MCNC: NEGATIVE — SIGNIFICANT CHANGE UP
NRBC # BLD: 1 /100 WBCS — HIGH (ref 0–0)
NRBC # BLD: 1 /100 — HIGH (ref 0–0)
NRBC # BLD: 2 /100 WBCS — HIGH (ref 0–0)
NRBC # BLD: 2 /100 WBCS — HIGH (ref 0–0)
PCO2 BLDV: 48 MMHG — SIGNIFICANT CHANGE UP (ref 35–50)
PH BLDV: 7.38 — SIGNIFICANT CHANGE UP (ref 7.35–7.45)
PH UR: 8.5 — HIGH (ref 5–8)
PLAT MORPH BLD: NORMAL — SIGNIFICANT CHANGE UP
PLATELET # BLD AUTO: 326 K/UL — SIGNIFICANT CHANGE UP (ref 150–400)
PLATELET # BLD AUTO: 330 K/UL — SIGNIFICANT CHANGE UP (ref 150–400)
PLATELET # BLD AUTO: 339 K/UL — SIGNIFICANT CHANGE UP (ref 150–400)
PLATELET # BLD AUTO: 365 K/UL — SIGNIFICANT CHANGE UP (ref 150–400)
PO2 BLDV: 37 MMHG — SIGNIFICANT CHANGE UP (ref 25–45)
POIKILOCYTOSIS BLD QL AUTO: SLIGHT — SIGNIFICANT CHANGE UP
POLYCHROMASIA BLD QL SMEAR: SIGNIFICANT CHANGE UP
POTASSIUM BLDV-SCNC: 3.7 MMOL/L — SIGNIFICANT CHANGE UP (ref 3.5–5.3)
POTASSIUM SERPL-MCNC: 4.4 MMOL/L — SIGNIFICANT CHANGE UP (ref 3.5–5.3)
POTASSIUM SERPL-SCNC: 4.4 MMOL/L — SIGNIFICANT CHANGE UP (ref 3.5–5.3)
PROT SERPL-MCNC: 6.5 G/DL — SIGNIFICANT CHANGE UP (ref 6–8.3)
PROT UR-MCNC: ABNORMAL
PROTHROM AB SERPL-ACNC: 11.4 SEC — SIGNIFICANT CHANGE UP (ref 10–12.9)
RBC # BLD: 1.86 M/UL — LOW (ref 3.8–5.2)
RBC # BLD: 2.29 M/UL — LOW (ref 3.8–5.2)
RBC # BLD: 2.32 M/UL — LOW (ref 3.8–5.2)
RBC # BLD: 2.35 M/UL — LOW (ref 3.8–5.2)
RBC # FLD: 19 % — HIGH (ref 10.3–14.5)
RBC # FLD: 21.4 % — HIGH (ref 10.3–14.5)
RBC # FLD: 21.5 % — HIGH (ref 10.3–14.5)
RBC # FLD: 21.9 % — HIGH (ref 10.3–14.5)
RBC BLD AUTO: ABNORMAL
RBC CASTS # UR COMP ASSIST: 5 /HPF — HIGH (ref 0–4)
RH IG SCN BLD-IMP: POSITIVE — SIGNIFICANT CHANGE UP
RH IG SCN BLD-IMP: POSITIVE — SIGNIFICANT CHANGE UP
SAO2 % BLDV: 64 % — LOW (ref 67–88)
SODIUM SERPL-SCNC: 136 MMOL/L — SIGNIFICANT CHANGE UP (ref 135–145)
SP GR SPEC: >1.05 (ref 1.01–1.02)
SPHEROCYTES BLD QL SMEAR: SLIGHT — SIGNIFICANT CHANGE UP
UROBILINOGEN FLD QL: ABNORMAL
VALPROATE SERPL-MCNC: 84 UG/ML — SIGNIFICANT CHANGE UP (ref 50–100)
WBC # BLD: 10.35 K/UL — SIGNIFICANT CHANGE UP (ref 3.8–10.5)
WBC # BLD: 10.36 K/UL — SIGNIFICANT CHANGE UP (ref 3.8–10.5)
WBC # BLD: 10.87 K/UL — HIGH (ref 3.8–10.5)
WBC # BLD: 9.96 K/UL — SIGNIFICANT CHANGE UP (ref 3.8–10.5)
WBC # FLD AUTO: 10.35 K/UL — SIGNIFICANT CHANGE UP (ref 3.8–10.5)
WBC # FLD AUTO: 10.36 K/UL — SIGNIFICANT CHANGE UP (ref 3.8–10.5)
WBC # FLD AUTO: 10.87 K/UL — HIGH (ref 3.8–10.5)
WBC # FLD AUTO: 9.96 K/UL — SIGNIFICANT CHANGE UP (ref 3.8–10.5)
WBC UR QL: 7 /HPF — HIGH (ref 0–5)

## 2019-11-09 PROCEDURE — 99291 CRITICAL CARE FIRST HOUR: CPT | Mod: GC

## 2019-11-09 PROCEDURE — 72170 X-RAY EXAM OF PELVIS: CPT | Mod: 26

## 2019-11-09 PROCEDURE — 71260 CT THORAX DX C+: CPT | Mod: 26

## 2019-11-09 PROCEDURE — 74177 CT ABD & PELVIS W/CONTRAST: CPT | Mod: 26

## 2019-11-09 PROCEDURE — 76705 ECHO EXAM OF ABDOMEN: CPT | Mod: 26

## 2019-11-09 PROCEDURE — 71045 X-RAY EXAM CHEST 1 VIEW: CPT | Mod: 26

## 2019-11-09 PROCEDURE — 73552 X-RAY EXAM OF FEMUR 2/>: CPT | Mod: 26,RT

## 2019-11-09 PROCEDURE — 70450 CT HEAD/BRAIN W/O DYE: CPT | Mod: 26

## 2019-11-09 PROCEDURE — 73706 CT ANGIO LWR EXTR W/O&W/DYE: CPT | Mod: 26,RT

## 2019-11-09 PROCEDURE — 93010 ELECTROCARDIOGRAM REPORT: CPT

## 2019-11-09 PROCEDURE — 72125 CT NECK SPINE W/O DYE: CPT | Mod: 26

## 2019-11-09 RX ORDER — PIPERACILLIN AND TAZOBACTAM 4; .5 G/20ML; G/20ML
3.38 INJECTION, POWDER, LYOPHILIZED, FOR SOLUTION INTRAVENOUS ONCE
Refills: 0 | Status: COMPLETED | OUTPATIENT
Start: 2019-11-09 | End: 2019-11-09

## 2019-11-09 RX ORDER — ACETAMINOPHEN 500 MG
2 TABLET ORAL
Qty: 0 | Refills: 0 | DISCHARGE

## 2019-11-09 RX ORDER — ATORVASTATIN CALCIUM 80 MG/1
80 TABLET, FILM COATED ORAL AT BEDTIME
Refills: 0 | Status: DISCONTINUED | OUTPATIENT
Start: 2019-11-09 | End: 2019-11-14

## 2019-11-09 RX ORDER — TETANUS TOXOID, REDUCED DIPHTHERIA TOXOID AND ACELLULAR PERTUSSIS VACCINE, ADSORBED 5; 2.5; 8; 8; 2.5 [IU]/.5ML; [IU]/.5ML; UG/.5ML; UG/.5ML; UG/.5ML
0.5 SUSPENSION INTRAMUSCULAR ONCE
Refills: 0 | Status: COMPLETED | OUTPATIENT
Start: 2019-11-09 | End: 2019-11-09

## 2019-11-09 RX ORDER — SITAGLIPTIN 50 MG/1
1 TABLET, FILM COATED ORAL
Qty: 0 | Refills: 0 | DISCHARGE

## 2019-11-09 RX ORDER — TETANUS TOXOID, REDUCED DIPHTHERIA TOXOID AND ACELLULAR PERTUSSIS VACCINE, ADSORBED 5; 2.5; 8; 8; 2.5 [IU]/.5ML; [IU]/.5ML; UG/.5ML; UG/.5ML; UG/.5ML
0.5 SUSPENSION INTRAMUSCULAR ONCE
Refills: 0 | Status: DISCONTINUED | OUTPATIENT
Start: 2019-11-09 | End: 2019-11-09

## 2019-11-09 RX ORDER — PROTAMINE SULFATE 10 MG/ML
25 AMPUL (ML) INTRAVENOUS ONCE
Refills: 0 | Status: COMPLETED | OUTPATIENT
Start: 2019-11-09 | End: 2019-11-09

## 2019-11-09 RX ORDER — DIVALPROEX SODIUM 500 MG/1
500 TABLET, DELAYED RELEASE ORAL DAILY
Refills: 0 | Status: DISCONTINUED | OUTPATIENT
Start: 2019-11-09 | End: 2019-11-09

## 2019-11-09 RX ORDER — ZOLPIDEM TARTRATE 10 MG/1
1 TABLET ORAL
Qty: 0 | Refills: 0 | DISCHARGE

## 2019-11-09 RX ORDER — ACETAMINOPHEN 500 MG
650 TABLET ORAL EVERY 6 HOURS
Refills: 0 | Status: DISCONTINUED | OUTPATIENT
Start: 2019-11-09 | End: 2019-11-14

## 2019-11-09 RX ORDER — LANOLIN ALCOHOL/MO/W.PET/CERES
1 CREAM (GRAM) TOPICAL
Qty: 0 | Refills: 0 | DISCHARGE

## 2019-11-09 RX ORDER — TRAZODONE HCL 50 MG
200 TABLET ORAL AT BEDTIME
Refills: 0 | Status: DISCONTINUED | OUTPATIENT
Start: 2019-11-09 | End: 2019-11-14

## 2019-11-09 RX ORDER — RISPERIDONE 4 MG/1
0.25 TABLET ORAL DAILY
Refills: 0 | Status: DISCONTINUED | OUTPATIENT
Start: 2019-11-09 | End: 2019-11-11

## 2019-11-09 RX ORDER — INFLUENZA VIRUS VACCINE 15; 15; 15; 15 UG/.5ML; UG/.5ML; UG/.5ML; UG/.5ML
0.5 SUSPENSION INTRAMUSCULAR ONCE
Refills: 0 | Status: DISCONTINUED | OUTPATIENT
Start: 2019-11-09 | End: 2019-11-14

## 2019-11-09 RX ORDER — DIVALPROEX SODIUM 500 MG/1
500 TABLET, DELAYED RELEASE ORAL DAILY
Refills: 0 | Status: DISCONTINUED | OUTPATIENT
Start: 2019-11-09 | End: 2019-11-14

## 2019-11-09 RX ORDER — SODIUM CHLORIDE 9 MG/ML
1000 INJECTION, SOLUTION INTRAVENOUS
Refills: 0 | Status: DISCONTINUED | OUTPATIENT
Start: 2019-11-09 | End: 2019-11-10

## 2019-11-09 RX ORDER — DIVALPROEX SODIUM 500 MG/1
4 TABLET, DELAYED RELEASE ORAL
Qty: 0 | Refills: 0 | DISCHARGE

## 2019-11-09 RX ORDER — SODIUM CHLORIDE 9 MG/ML
1000 INJECTION, SOLUTION INTRAVENOUS ONCE
Refills: 0 | Status: COMPLETED | OUTPATIENT
Start: 2019-11-09 | End: 2019-11-09

## 2019-11-09 RX ADMIN — SODIUM CHLORIDE 75 MILLILITER(S): 9 INJECTION, SOLUTION INTRAVENOUS at 22:08

## 2019-11-09 RX ADMIN — Medication 210 MILLIGRAM(S): at 17:08

## 2019-11-09 RX ADMIN — SODIUM CHLORIDE 1000 MILLILITER(S): 9 INJECTION, SOLUTION INTRAVENOUS at 17:04

## 2019-11-09 RX ADMIN — PIPERACILLIN AND TAZOBACTAM 200 GRAM(S): 4; .5 INJECTION, POWDER, LYOPHILIZED, FOR SOLUTION INTRAVENOUS at 17:05

## 2019-11-09 RX ADMIN — Medication 650 MILLIGRAM(S): at 22:17

## 2019-11-09 RX ADMIN — DIVALPROEX SODIUM 500 MILLIGRAM(S): 500 TABLET, DELAYED RELEASE ORAL at 22:07

## 2019-11-09 RX ADMIN — RISPERIDONE 0.25 MILLIGRAM(S): 4 TABLET ORAL at 22:07

## 2019-11-09 RX ADMIN — Medication 650 MILLIGRAM(S): at 23:00

## 2019-11-09 RX ADMIN — TETANUS TOXOID, REDUCED DIPHTHERIA TOXOID AND ACELLULAR PERTUSSIS VACCINE, ADSORBED 0.5 MILLILITER(S): 5; 2.5; 8; 8; 2.5 SUSPENSION INTRAMUSCULAR at 16:36

## 2019-11-09 RX ADMIN — Medication 200 MILLIGRAM(S): at 22:07

## 2019-11-09 NOTE — PATIENT PROFILE ADULT - NSPROHMSYMPCOND_GEN_A_NUR
+ splenic hemorrhage w/out blush on CTA, VSS, pt comfortable in stretcher, admitting to surgical service for monitoring and likely surgical management of recurrent splenic hemorrhage. cardiovascular/diabetes

## 2019-11-09 NOTE — CONSULT NOTE ADULT - ASSESSMENT
69F PMHx CVA on ASA, plavix, and Tlovenox BIBEMS from rehab facility after a suspected fall, initially brought to the main ED area but Level 2 called when she was hypoxic. Primary survey intact, GCS difficult to assess given baseline mental status of AXO x1. Secondary survey significant for left supraorbital edema and ecchymosis with superficial abrasion, RUQ abdominal tenderness, lower abdominal skin ecchymosis, RLE ecchymosis and swelling, RLE thigh firm, nontender, full ROM and full sensation 69F PMHx CVA on ASA, plavix, and Tlovenox BIBEMS from rehab facility after a suspected fall as level II trauma, imaging revealing right LE intramuscular hematoma     Neuro: AAOx1 at baseline   - monitor mental status   - Tylenol PRN for pain control  - c/w home Trazadone, divalproex, and risperidone     Resp:   - no active issues   - monitor O2 saturation     Cardiac:   - holding ASA and Plavix  - currently holding T. lovenox   - monitor vital signs     GI:   - NPO     Renal:   - monitor Is and Os  - IVF   - monitor electrolytes and replete     Heme:   - Q1 CBC for 3hrs   - trend lactate  - holding DVT ppx     ID:   - no active issues     Endo:   - FSG  - ISS     Dispo: SICU

## 2019-11-09 NOTE — H&P ADULT - HISTORY OF PRESENT ILLNESS
TRAUMA SURGERY - CONSULT NOTE  --------------------------------------------------------------------------------------------    TRAUMA ACTIVATION LEVEL: 2    MECHANISM OF INJURY:      [X] Blunt  	[] MVC	[X] Fall	[] Pedestrian Struck	[] Motorcycle accident      [] Penetrating  	[] Gun Shot Wound 		[] Stab Wound    GCS: 	E: 4	V: 4	M: 6    HPI:   69F PMHx CVA on ASA, plavix, and Tlovenox BIBEMS from rehab facility after a suspected fall, initially brought to the main ED area but Level 2 called when she was hypoxic. Primary survey intact, GCS difficult to assess given baseline mental status of AXO x1. Secondary survey significant for left supraorbital edema and ecchymosis with superficial abrasion, RUQ abdominal tenderness, lower abdominal skin ecchymosis, RLE ecchymosis and swelling, RLE thigh firm, nontender, full ROM and full sensation    Primary Survey:    A - airway intact  B - bilateral breath sounds and good chest rise  C - initial BP: 140/82 (11-09-19 @ 14:52) , HR: 114 (11-09-19 @ 14:52 , palpable pulses in all extremities  D - GCS 15 on arrival  Exposure obtained      Secondary Survey:   General: NAD  HEENT:  left supraorbital edema and ecchymosis, EOMI, PEERLA.  Neck: Soft, midline trachea, C collar in place  Chest: No chest wall tenderness.   Cardiac: S1, S2, RRR  Respiratory: Bilateral breath sounds, clear and equal bilaterally  Abdomen: Soft, non-distended, non-tender, no rebound, no guarding, no masses palpated  Hips/Groin: Normal appearing. Pelvis is stable, palpable femoral pulses bilaterally  Ext: palp radial b/l UE, RLE edema from ASIS to toes, RLE circumferential ecchymosis from ASIS to knee, nontender, no superifical abrasion, palp DP and PT in b/l LEs, motor and sensory grossly intact in all 4 extremities  Back: no TTP, no palpable runoff/stepoff/deformity  Rectal: No carlin blood     PAST MEDICAL & SURGICAL HISTORY:  Alcohol dependence  Hyperlipemia  HTN (hypertension)  Depression  Depression  Hyperlipemia  HTN (hypertension)    FAMILY HISTORY: not pertinent as reviewed with the patient and family    SOCIAL HISTORY: Came from Rehab center, Hx of Alcoholism    ALLERGIES: No Known Allergies      HOME MEDICATIONS: ASA, plavix, T lovenox, risperidone, trazadone, Insulin Kwikpen, Sitagliptin, melatonin

## 2019-11-09 NOTE — PROVIDER CONTACT NOTE (OTHER) - ASSESSMENT
Pt temp 100.4. Pt RLE warm to touch, firm, tender and has eechymosis. Pt abdomen distended, has eechymosis & soft to touch.

## 2019-11-09 NOTE — H&P ADULT - ASSESSMENT
ASSESSMENT  69F PMHx CVA on ASA, plavix, and Tlovenox BIBEMS from rehab facility after a suspected fall, initially brought to the main ED area but Level 2 called when she was hypoxic. Primary survey intact, GCS difficult to assess given baseline mental status of AXO x1. Secondary survey significant for left supraorbital edema and ecchymosis with superficial abrasion, RUQ abdominal tenderness, lower abdominal skin ecchymosis, RLE ecchymosis and swelling, RLE thigh firm, nontender, full ROM and full sensation  Found to have significant H/H drop from baseline 22 from 40 and lactic acidosis  Plan for CTA of RLE  Imaging showing:  No acute fractures  SubQ soft tissue contusion involving the right hip and proximal femur  Aneurysmal dilatation of ascending thoracic aorta, measuring up to 4.3 cm  Small complex cyst of Left ovary    PLAN  - Transfuse PRN  - Given drop in H/H, significant R thigh ecchymosis and edema, CTA of RLE  - Admit to ACS/Trauma service under Dr. Stewart   - Hold antiplatelets, anticoagulants  - SICU consultation  - Seen and examined with Dr. Stewart     Acute Care Surgery  [id number]

## 2019-11-09 NOTE — ED PROVIDER NOTE - OBJECTIVE STATEMENT
Attending Tresa Taylor: 70 y/o female with multiple medical issues including CVA, dementia, presenting after unwitnessed fall. reportedly found on the ground. per report normally oriented x1. is on lovenox. unclear history Attending Tresa Taylor: 68 y/o female with multiple medical issues including CVA, dementia, presenting after unwitnessed fall. reportedly found on the ground. per report normally oriented x1. is on lovenox for CVA as per paperwork. unclear history

## 2019-11-09 NOTE — CONSULT NOTE ADULT - SUBJECTIVE AND OBJECTIVE BOX
TRAUMA SURGERY - CONSULT NOTE  --------------------------------------------------------------------------------------------    TRAUMA ACTIVATION LEVEL: 2    MECHANISM OF INJURY:      [X] Blunt  	[] MVC	[X] Fall	[] Pedestrian Struck	[] Motorcycle accident      [] Penetrating  	[] Gun Shot Wound 		[] Stab Wound    GCS: 	E: 4	V: 5	M: 6    HPI:   69F PMHx CVA on ASA, plavix, and Tlovenox BIBEMS from rehab facility after a suspected fall, initially brought to the main ED area but Level 2 called when she was hypoxic. Primary survey intact, GCS difficult to assess given baseline mental status of AXO x1. Secondary survey significant for left supraorbital edema and ecchymosis with superficial abrasion, RUQ abdominal tenderness, lower abdominal skin ecchymosis, RLE ecchymosis and swelling, RLE thigh firm, nontender, full ROM and full sensation    Primary Survey:    A - airway intact  B - bilateral breath sounds and good chest rise  C - initial BP: 140/82 (11-09-19 @ 14:52) , HR: 114 (11-09-19 @ 14:52 , palpable pulses in all extremities  D - GCS 15 on arrival  Exposure obtained      Secondary Survey:   General: NAD  HEENT:  left supraorbital edema and ecchymosis, EOMI, PEERLA.  Neck: Soft, midline trachea, C collar in place  Chest: No chest wall tenderness.   Cardiac: S1, S2, RRR  Respiratory: Bilateral breath sounds, clear and equal bilaterally  Abdomen: Soft, non-distended, non-tender, no rebound, no guarding, no masses palpated  Hips/Groin: Normal appearing. Pelvis is stable, palpable femoral pulses bilaterally  Ext: palp radial b/l UE, RLE edema from ASIS to toes, RLE circumferential ecchymosis from ASIS to knee, nontender, no superifical abrasion, palp DP and PT in b/l LEs, motor and sensory grossly intact in all 4 extremities  Back: no TTP, no palpable runoff/stepoff/deformity  Rectal: No carlin blood     PAST MEDICAL & SURGICAL HISTORY:  Alcohol dependence  Hyperlipemia  HTN (hypertension)  Depression  Depression  Hyperlipemia  HTN (hypertension)    FAMILY HISTORY:    [] Family history not pertinent as reviewed with the patient and family    SOCIAL HISTORY: Came from     ALLERGIES: No Known Allergies      HOME MEDICATIONS: ASA, plavix, T lovenox    CURRENT MEDICATIONS  MEDICATIONS (STANDING):   MEDICATIONS (PRN):    --------------------------------------------------------------------------------------------    LABS                --------------------------------------------------------------------------------------------    MICROBIOLOGY      --------------------------------------------------------------------------------------------    IMAGING  ***    --------------------------------------------------------------------------------------------    ASSESSMENT: Patient is a 69y old f with ***    PLAN:  ***  -   -   -   -   - Patient seen/examined with attending.  - Plan to be discussed with Attending,      Acute [facility] Surgery  [id number] TRAUMA SURGERY - CONSULT NOTE  --------------------------------------------------------------------------------------------    TRAUMA ACTIVATION LEVEL: 2    MECHANISM OF INJURY:      [X] Blunt  	[] MVC	[X] Fall	[] Pedestrian Struck	[] Motorcycle accident      [] Penetrating  	[] Gun Shot Wound 		[] Stab Wound    GCS: 	E: 4	V: 4	M: 6    HPI:   69F PMHx CVA on ASA, plavix, and Tlovenox BIBEMS from rehab facility after a suspected fall, initially brought to the main ED area but Level 2 called when she was hypoxic. Primary survey intact, GCS difficult to assess given baseline mental status of AXO x1. Secondary survey significant for left supraorbital edema and ecchymosis with superficial abrasion, RUQ abdominal tenderness, lower abdominal skin ecchymosis, RLE ecchymosis and swelling, RLE thigh firm, nontender, full ROM and full sensation    Primary Survey:    A - airway intact  B - bilateral breath sounds and good chest rise  C - initial BP: 140/82 (11-09-19 @ 14:52) , HR: 114 (11-09-19 @ 14:52 , palpable pulses in all extremities  D - GCS 15 on arrival  Exposure obtained      Secondary Survey:   General: NAD  HEENT:  left supraorbital edema and ecchymosis, EOMI, PEERLA.  Neck: Soft, midline trachea, C collar in place  Chest: No chest wall tenderness.   Cardiac: S1, S2, RRR  Respiratory: Bilateral breath sounds, clear and equal bilaterally  Abdomen: Soft, non-distended, non-tender, no rebound, no guarding, no masses palpated  Hips/Groin: Normal appearing. Pelvis is stable, palpable femoral pulses bilaterally  Ext: palp radial b/l UE, RLE edema from ASIS to toes, RLE circumferential ecchymosis from ASIS to knee, nontender, no superifical abrasion, palp DP and PT in b/l LEs, motor and sensory grossly intact in all 4 extremities  Back: no TTP, no palpable runoff/stepoff/deformity  Rectal: No carlin blood     PAST MEDICAL & SURGICAL HISTORY:  Alcohol dependence  Hyperlipemia  HTN (hypertension)  Depression  Depression  Hyperlipemia  HTN (hypertension)    FAMILY HISTORY:    [] Family history not pertinent as reviewed with the patient and family    SOCIAL HISTORY: Came from     ALLERGIES: No Known Allergies      HOME MEDICATIONS: ASA, plavix, T lovenox    CURRENT MEDICATIONS  MEDICATIONS (STANDING):   MEDICATIONS (PRN):    --------------------------------------------------------------------------------------------    LABS                --------------------------------------------------------------------------------------------    MICROBIOLOGY      --------------------------------------------------------------------------------------------    IMAGING  ***    --------------------------------------------------------------------------------------------    ASSESSMENT: Patient is a 69y old f with ***    PLAN:  ***  -   -   -   -   - Patient seen/examined with attending.  - Plan to be discussed with Attending,      Acute [facility] Surgery  [id number] TRAUMA SURGERY - CONSULT NOTE  --------------------------------------------------------------------------------------------    TRAUMA ACTIVATION LEVEL: 2    MECHANISM OF INJURY:      [X] Blunt  	[] MVC	[X] Fall	[] Pedestrian Struck	[] Motorcycle accident      [] Penetrating  	[] Gun Shot Wound 		[] Stab Wound    GCS: 	E: 4	V: 4	M: 6    HPI:   69F PMHx CVA on ASA, plavix, and Tlovenox BIBEMS from rehab facility after a suspected fall, initially brought to the main ED area but Level 2 called when she was hypoxic. Primary survey intact, GCS difficult to assess given baseline mental status of AXO x1. Secondary survey significant for left supraorbital edema and ecchymosis with superficial abrasion, RUQ abdominal tenderness, lower abdominal skin ecchymosis, RLE ecchymosis and swelling, RLE thigh firm, nontender, full ROM and full sensation    Primary Survey:    A - airway intact  B - bilateral breath sounds and good chest rise  C - initial BP: 140/82 (11-09-19 @ 14:52) , HR: 114 (11-09-19 @ 14:52 , palpable pulses in all extremities  D - GCS 15 on arrival  Exposure obtained      Secondary Survey:   General: NAD  HEENT:  left supraorbital edema and ecchymosis, EOMI, PEERLA.  Neck: Soft, midline trachea, C collar in place  Chest: No chest wall tenderness.   Cardiac: S1, S2, RRR  Respiratory: Bilateral breath sounds, clear and equal bilaterally  Abdomen: Soft, non-distended, non-tender, no rebound, no guarding, no masses palpated  Hips/Groin: Normal appearing. Pelvis is stable, palpable femoral pulses bilaterally  Ext: palp radial b/l UE, RLE edema from ASIS to toes, RLE circumferential ecchymosis from ASIS to knee, nontender, no superifical abrasion, palp DP and PT in b/l LEs, motor and sensory grossly intact in all 4 extremities  Back: no TTP, no palpable runoff/stepoff/deformity  Rectal: No carlin blood     PAST MEDICAL & SURGICAL HISTORY:  Alcohol dependence  Hyperlipemia  HTN (hypertension)  Depression  Depression  Hyperlipemia  HTN (hypertension)    FAMILY HISTORY:    [] Family history not pertinent as reviewed with the patient and family    SOCIAL HISTORY: Came from     ALLERGIES: No Known Allergies      HOME MEDICATIONS: ASA, plavix, T lovenox    CURRENT MEDICATIONS  MEDICATIONS (STANDING):   MEDICATIONS (PRN):    --------------------------------------------------------------------------------------------    LABS                          6.6    10.35 )-----------( 365      ( 09 Nov 2019 15:35 )             21.0   11-09    136  |  98  |  16  ----------------------------<  132<H>  4.4   |  24  |  0.46<L>    Ca    9.8      09 Nov 2019 15:35    TPro  6.5  /  Alb  3.5  /  TBili  1.9<H>  /  DBili  x   /  AST  40  /  ALT  16  /  AlkPhos  40  11-09              --------------------------------------------------------------------------------------------  < from: CT Head No Cont (11.09.19 @ 16:05) >    FINDINGS:     CT BRAIN:     Limited evaluation secondary to motion and streak artifact.    No acute intracranial hemorrhage, mass effect or midline shift. No   displaced calvarial fracture.    The ventricles and sulci are within normal limits for the patient's age   without evidence of hydrocephalus. Age-indeterminate lacunar infarct   involving the left posterior thalamus, new since 9/27/2019 (series 2,   image 16). A chronic lacunar infarct within the left ventral thalamus   appears unchanged. Redemonstrated chronic infarcts inthe right frontal   lobe and left corona radiata.    Mild mucosal thickening of the bilateral maxillary sinuses. A few upper   molar teeth with roots extending into the inferior portion of the   bilateral maxillary sinuses. The visualized intraorbital compartments,   remaining paranasal sinuses and mastoid complexes are free of acute   disease.     A large left frontal scalp hematoma/laceration is noted.    CT CERVICAL SPINE:     Limited evaluation secondary to motion and streak artifact.    No acute fracture or subluxation. No prevertebral soft tissue swelling.    Straightening of the cervical lordosis. The vertebral body alignment is   grossly maintained.     There are moderate multilevel degenerative changes of the cervical spine,   represented by disc space narrowing, disc bulges, disc-osteophyte   complexes, ligamentum flavum hypertrophy and facet hypertrophy. These   findings contribute to multilevel neural foraminal and spinal canal   narrowing, the overall degree of which is not well-demonstrated on this   study.    The atlanto-dental and atlanto-occipital joints are maintained. Articular   facets and posterior elements alignment is maintained.     The partially imaged lung apices are clear.     IMPRESSION:     Limited evaluation secondary to motion and streak artifact.    CT BRAIN:     No acute intracranial hemorrhage, mass effect or midline shift.     No displaced calvarial fracture. Large left frontal scalp hematoma.    Age-indeterminate lacunar infarct involving the left thalamus, new since   9/27/2019. Redemonstrated chronic infarcts in the right frontal lobe and   left corona radiata.    CT CERVICAL SPINE:   No acute fracture or subluxation. Moderate multilevel cervical   spondylosis.            < end of copied text >  < from: CT Abdomen and Pelvis w/ IV Cont (11.09.19 @ 15:56) >  FINDINGS:    CHEST:     LUNGS AND LARGE AIRWAYS: Patent central airways. Mild bibasilar linear   and dependent atelectasis. Evaluationof the lung parenchyma is markedly   suboptimal due to persistent breathing artifacts.  PLEURA: No pleural effusion.  VESSELS: No evidence of vascular injury. There is dilatation of the   ascending thoracic aorta, measuring up to 4.3 cm in greatest diameter   (7:31).  HEART: Heart size is normal. No pericardial effusion. Aortic valvular and   mitral annular calcifications.  MEDIASTINUM AND SYBIL: No lymphadenopathy.  CHEST WALL AND LOWER NECK: Within normal limits.    ABDOMEN AND PELVIS:    LIVER:Within normal limits.  BILE DUCTS: Normal caliber.  GALLBLADDER: Within normal limits.  SPLEEN: Within normal limits.  PANCREAS: Within normal limits.  ADRENALS: Within normal limits.  KIDNEYS/URETERS: No hydronephrosis. Symmetric enhancement. Tiny left   renal hypodense focus that is too small to characterize.    BLADDER: Underdistended.  REPRODUCTIVE ORGANS: The uterus and the right ovary are normal for   patient's age. A small complex cyst in the left ovary. Bilateral   fat-containing inguinalhernias.    BOWEL: Small ampullary duodenal diverticulum. No bowel obstruction.   Sigmoid diverticulosis. Appendix is not visualized. No evidence of   inflammation in the pericecal region.  PERITONEUM: No ascites. No evidence of pneumoperitoneum.  VESSELS: No evidence of vascular injury. Atherosclerotic changes.  RETROPERITONEUM/LYMPH NODES: No lymphadenopathy.    ABDOMINAL WALL: Overall enlargement of the visualized right lower   extremity with subcutaneous fatty stranding at the level of right   proximal femur and hip.  BONES: No acute displaced fracture. Prior left rib deformities.    IMPRESSION:   Overall suboptimal evaluation due to persistent breathing artifacts.  No acute displaced fracture. Likely a subcutaneous soft tissue contusion   involving the right hip and proximal femur.  Aneurysmal dilatation of ascending thoracic aorta, measuring up to 4.3 cm.  Small complex cyst in the left ovary in this postmenopausal patient.   Pelvic ultrasound is advised for further evaluation on outpatient basis.

## 2019-11-09 NOTE — CHART NOTE - NSCHARTNOTEFT_GEN_A_CORE
tachycardia improved  lactic acidosis resolved  not requiring transfusion in SICU  -no evidence of ongoing bleeding from right thigh hematoma so will complete hemorrhage watch protocol

## 2019-11-09 NOTE — ED PROVIDER NOTE - PROGRESS NOTE DETAILS
Attending Tresa Taylor: pt found to be sig tachycardic and anemic. trauma scans neagtive reportedly for intra abdominal process. concern for possible bleeding to femur area. called for xray of femur. pt with low grade temp of 100. cultures sent and covered with abx. trauma consulted as pt made level 2 upon arrival Attending Tresa Taylor: d/w trauma, pt with sig anemia, will ct leg to further evaluate for active bleeding, ct and xr called to expedite will give blood products

## 2019-11-09 NOTE — ED ADULT NURSE NOTE - NSIMPLEMENTINTERV_GEN_ALL_ED
Implemented All Fall with Harm Risk Interventions:  [place] to call system. Call bell, personal items and telephone within reach. Instruct patient to call for assistance. Room bathroom lighting operational. Non-slip footwear when patient is off stretcher. Physically safe environment: no spills, clutter or unnecessary equipment. Stretcher in lowest position, wheels locked, appropriate side rails in place. Provide visual cue, wrist band, yellow gown, etc. Monitor gait and stability. Monitor for mental status changes and reorient to person, place, and time. Review medications for side effects contributing to fall risk. Reinforce activity limits and safety measures with patient and family. Provide visual clues: red socks.

## 2019-11-09 NOTE — CONSULT NOTE ADULT - ASSESSMENT
ASSESSMENT  69F PMHx CVA on ASA, plavix, and Tlovenox BIBEMS from rehab facility after a suspected fall, initially brought to the main ED area but Level 2 called when she was hypoxic. Primary survey intact, GCS difficult to assess given baseline mental status of AXO x1. Secondary survey significant for left supraorbital edema and ecchymosis with superficial abrasion, RUQ abdominal tenderness, lower abdominal skin ecchymosis, RLE ecchymosis and swelling, RLE thigh firm, nontender, full ROM and full sensation    PLAN  - CXR  - Trauma labs  - CT Head, Chest, C Spine, Abdomen and Pelvis  - Cont C collar  - Hold antiplatelets, anticoagulants  - Seen and examined with Dr. Douglas    Acute Care Surgery  [id number] ASSESSMENT  69F PMHx CVA on ASA, plavix, and Tlovenox BIBEMS from rehab facility after a suspected fall, initially brought to the main ED area but Level 2 called when she was hypoxic. Primary survey intact, GCS difficult to assess given baseline mental status of AXO x1. Secondary survey significant for left supraorbital edema and ecchymosis with superficial abrasion, RUQ abdominal tenderness, lower abdominal skin ecchymosis, RLE ecchymosis and swelling, RLE thigh firm, nontender, full ROM and full sensation    PLAN  - CXR  - Trauma labs  - CT Head, Chest, C Spine, Abdomen and Pelvis  - Cont C collar  - Xrays of pelvis and R femur  - Hold antiplatelets, anticoagulants  - Seen and examined with Dr. Douglas    Acute Care Surgery  [id number]

## 2019-11-09 NOTE — ED ADULT TRIAGE NOTE - HEART RATE (BEATS/MIN)
Throat Pain/Nasal Congestion





- HPI Summary


HPI Summary: 


Patient is a 22-year-old male who presents emergency department for ongoing 

sore throat.  Patient's mother notes that he was diagnosed with the flu about 2 

weeks ago started with a sore throat about a week ago.  Patient was seen at an 

outside facility and started on amoxicillin 2-3 days ago.  Patient's mother 

states that throat pain and swelling increased today patient presents for 

evaluation.  Patient is able to tolerate solids and liquids. No past medical 

hx. No associated fever, rash, Vomiting, diarrhea, urinary sxs. Symptoms are 

mild-moderate in severity. No current modifying factors.








- History of Current Complaint


Chief Complaint: EDThroatPain


Time Seen by Provider: 03/20/19 13:53


Hx Obtained From: Patient, Family/Caretaker





- Allergies/Home Medications


Allergies/Adverse Reactions: 


 Allergies











Allergy/AdvReac Type Severity Reaction Status Date / Time


 


No Known Allergies Allergy   Verified 03/21/19 07:47











Home Medications: 


 Home Medications





Amoxicillin 875 mg PO BID 03/20/19 [History Confirmed 03/20/19]


Venlafaxine HCl 75 mg PO DAILY 03/20/19 [History Confirmed 03/20/19]











PMH/Surg Hx/FS Hx/Imm Hx


Previously Healthy: Yes


Endocrine/Hematology History: 


   Denies: Hx Diabetes, Hx Thyroid Disease


Cardiovascular History: 


   Denies: Hx Hypertension


Respiratory History: 


   Denies: Hx Asthma, Hx Chronic Obstructive Pulmonary Disease (COPD)


GI History: 


   Denies: Hx Ulcer


Infectious Disease History: No


Infectious Disease History: 


   Denies: Hx Hepatitis, Hx Human Immunodeficiency Virus (HIV), Traveled 

Outside the [place] in Last 30 Days





- Family History


Known Family History: Positive: None, Non-Contributory





- Social History


Occupation: Employed Full-time


Lives: With Family


Alcohol Use: Occasionally


Substance Use Type: Reports: None


Smoking Status (MU): Never Smoked Tobacco


Have You Smoked in the Last Year: No





Review of Systems


Constitutional: Negative


Negative: Fever, Chills


Eyes: Negative


Positive: Sore Throat


Cardiovascular: Negative


Negative: Palpitations, Chest Pain


Respiratory: Negative


Negative: Shortness Of Breath, Cough


Positive: Abdominal Pain.  Negative: Vomiting, Diarrhea, Nausea


Genitourinary: Negative


Musculoskeletal: Negative


Skin: Negative


Neurological: Negative


All Other Systems Reviewed And Are Negative: Yes





Physical Exam


Triage Information Reviewed: Yes


Vital Signs On Initial Exam: 


 Initial Vitals











Temp Pulse Resp BP Pulse Ox


 


 99.6 F   84   16   130/88   98 


 


 03/20/19 11:23  03/20/19 11:23  03/20/19 11:23  03/20/19 11:23  03/20/19 11:23











Vital Signs Reviewed: Yes


Appearance: Positive: Well-Appearing - Pt. sitting up in bed in NAD. Appears 

anxious. Mother present.


Skin: Positive: Warm, Dry


Head/Face: Positive: Normal Head/Face Inspection


Eyes: Positive: Normal, EOMI


ENT: Positive: TMs normal, Other - Moderate, symmetric bilateral tonsilar edema 

with exudates. Uvula is midline without deviation or edema. Muffled voice 

noted. No drooling or pooling or secretions.


Neck: Positive: Supple, Nontender.  Negative: Nuchal Rigidity


Respiratory/Lung Sounds: Positive: Clear to Auscultation, Breath Sounds Present


Cardiovascular: Positive: Normal, RRR


Neurological: Positive: Normal, CN Intact II-III


Psychiatric: Positive: Affect/Mood Appropriate





Diagnostics





- Vital Signs


 Vital Signs











  Temp Pulse Resp BP Pulse Ox


 


 03/20/19 13:25  103.8 F  121  16  127/67  96


 


 03/20/19 11:23  99.6 F  84  16  130/88  98














- Laboratory


Result Diagrams: 


 03/20/19 14:16





 03/20/19 14:16


Lab Statement: Any lab studies that have been ordered have been reviewed, and 

results considered in the medical decision making process.





EENT Course/Dx





- Course


Course Of Treatment: Patient presenting for evaluation of ongoing sore throat 

and tonsillar swelling.  He is afebrile with stable vital signs.  Patient is 

tolerating secretions.  Based on his exam suspect mononucleosis.  Patient was 

started on IV fluids and given IV Toradol and Decadron.  Labs show a 

leukocytosis of 18.9 with elevated lymphocytes. CMP unremarkable other than 

elevated liver enzymes. Mono is positive. On re-exam pt. states he is feeling a 

bit better and feels it is easier to talk. Pt. attempted to drink water but 

states he is having a really hard time swallowing secondary to pain. Discussed 

with pt. and mother discharge home with some pain medication. Pt.'s mother is 

very concerned with pt. going home and is concerned he is not drinking much and 

is concerned swellng will get worse. Pt.'s mother requesting to consider 

admission. Hospitalist consulted on pt. and do not feel pt. meets admission 

criteria at this time. Pt. able to swallow ultram tab and is tolerating fluids. 

Will try magic swizzle. Plan to dc home with rx for magic swizzle, motrin and 

ultram. To dc amoxicillin. Close fu with PCP for re-eval and repeat lfts. To 

return to ER if sxs change or worsen.





- Differential Diagnoses


Differential Diagnoses: Epiglottitis, Laryngitis, Hilario's Angina, Otitis Media

, Periodontic Abscess, Pharyngitis





- Diagnoses


Provider Diagnoses: 


 CMV mononucleosis








Discharge





- Sign-Out/Discharge


Documenting (check all that apply): Patient Departure


Patient Received Moderate/Deep Sedation with Procedure: No





- Discharge Plan


Condition: Improved


Disposition: HOME


Prescriptions: 


Ibuprofen 800 mg PO TID #20 tablet


Magic Mouth Was-CORNELIUS/MAAL/LIDO* 5 ml SWISH SWAL QID #100 ml


traMADol TAB* [Ultram*] 50 mg PO Q6H PRN #12 tab MDD 4 tablets


 PRN Reason: Pain


Patient Education Materials:  Mononucleosis (ED)


Referrals: 


Pritesh Akhtar DO [Primary Care Provider] - 


Additional Instructions: 


Call PCP tomorrow for a close follow up appointment for recheck and repeat 

liver functions


Discontinue Amoxicillin 


Increase fluids


Take pain medication as directed


No contact sports until cleared by PCP


Return to ER if symptoms change or worsen





- Billing Disposition and Condition


Condition: IMPROVED


Disposition: Home





- Attestation Statements


Provider Attestation: 





I was available for consult. This patient was seen by the MICKEY. The patient was 

not presented to, seen by, or examined by me. -Reyna
[id number]

## 2019-11-09 NOTE — CONSULT NOTE ADULT - SUBJECTIVE AND OBJECTIVE BOX
SICU CONSULT NOTE    HPI  LEANNA BRAMBILA is a 69F PMHx CVA on ASA, plavix, and Tlovenox BIBEMS from rehab facility after a suspected fall, initially brought to the main ED area but Level 2 called when she was hypoxic. Primary survey intact, GCS difficult to assess given baseline mental status of AXO x1. Secondary survey significant for left supraorbital edema and ecchymosis with superficial abrasion, RUQ abdominal tenderness, lower abdominal skin ecchymosis, RLE ecchymosis and swelling, RLE thigh firm, nontender, full ROM and full sensation. Primary survey intact. Secondary survey revealing ecchymosis of right thigh, firm, denies any TTP. DP/PT pulses palpable. Patient found to have H/H 6.6/21 on arrival was given 1u pRBC in ED with appropriate response to 8.1/25. CTA demonstrating intramuscular hematoma, patient transferred to SICU for hemorrhage watch.     PAST MEDICAL HISTORY: Alcohol dependence  Hyperlipemia  HTN (hypertension)  Depression      PAST SURGICAL HISTORY: Depression  Hyperlipemia  HTN (hypertension)      FAMILY HISTORY: No pertinent family history in first degree relatives      SOCIAL HISTORY:  hx of ETOH abuse   CODE STATUS: full code     HOME MEDICATIONS:  · 	clopidogrel 75 mg oral tablet: 1 tab(s) orally once a day  · 	atorvastatin 80 mg oral tablet: 1 tab(s) orally once a day (at bedtime)  · 	Aspirin Enteric Coated 81 mg oral delayed release tablet: 2 tab(s) orally once a day  · 	divalproex sodium 250 mg oral tablet, extended release: 2 tab(s) orally once a day  · 	enoxaparin: 0.7 milliliter(s) injectable every 12 hours  · 	SITagliptin 100 mg oral tablet: 1 tab(s) orally once a day  · 	Melatonin 5 mg oral capsule: 1 cap(s) orally once a day (at bedtime)  · 	HumaLOG KwikPen 100 units/mL injectable solution:   · 	Colace 100 mg oral capsule: 1 cap(s) orally once a day  · 	Flonase 50 mcg/inh nasal spray: 1 spray(s) nasal once a day  · 	loratadine 10 mg oral tablet: 1 tab(s) orally once a day  · 	Metamucil: 1 packet(s) orally once a day  · 	risperiDONE 0.25 mg oral tablet: 1 tab(s) orally once a day  · 	Vitamin D3 5000 intl units oral capsule: 1 cap(s) orally once a day  · 	metFORMIN 1000 mg oral tablet: 1 tab(s) orally 2 times a day (with meals)  · 	LORazepam 0.5 mg oral tablet: 1 tab(s) orally 4 times a day  · 	Senna 8.6 mg oral tablet: 1 tab(s) orally once a day (at bedtime)  · 	traZODone 100 mg oral tablet: 2 tab(s) orally once a day (at bedtime)    ALLERGIES: No Known Allergies      VITAL SIGNS:  ICU Vital Signs Last 24 Hrs  T(C): 37.9 (09 Nov 2019 19:30), Max: 37.9 (09 Nov 2019 19:30)  T(F): 100.2 (09 Nov 2019 19:30), Max: 100.2 (09 Nov 2019 19:30)  HR: 111 (09 Nov 2019 19:30) (110 - 114)  BP: 161/82 (09 Nov 2019 19:30) (140/82 - 161/82)  BP(mean): 113 (09 Nov 2019 19:30) (113 - 115)  ABP: --  ABP(mean): --  RR: 35 (09 Nov 2019 19:30) (18 - 35)  SpO2: 94% (09 Nov 2019 19:30) (94% - 100%)      NEURO  Exam: AAOx1, alert, awake, following commands   acetaminophen   Tablet .. 650 milliGRAM(s) Oral every 6 hours PRN Mild Pain (1 - 3)      RESPIRATORY  Mechanical Ventilation:     Exam: CTABL, no chest wall tenderness       CARDIOVASCULAR  VBG - ( 09 Nov 2019 19:42 )  pH: 7.42  /  pCO2: 46    /  pO2: 24    / HCO3: 29    / Base Excess: 4.7   /  SaO2: 37     Lactate: 2.4              Exam: RRR  Cardiac Rhythm:  normal sinus rhythm     GI/NUTRITION  Exam: soft, NT, mildly distended, mid-abdomen with ecchymosis   Diet: NPO       GENITOURINARY/RENAL  lactated ringers. 1000 milliLiter(s) IV Continuous <Continuous>      Weight (kg): 69.7 (11-09 @ 19:15)  11-09    136  |  98  |  16  ----------------------------<  132<H>  4.4   |  24  |  0.46<L>    Ca    9.8      09 Nov 2019 15:35    TPro  6.5  /  Alb  3.5  /  TBili  1.9<H>  /  DBili  x   /  AST  40  /  ALT  16  /  AlkPhos  40  11-09    [ ] Fowler catheter, indication: urine output monitoring in critically ill patient    HEMATOLOGIC  [ ] VTE Prophylaxis:                          8.1    10.87 )-----------( 326      ( 09 Nov 2019 17:59 )             25.0     PT/INR - ( 09 Nov 2019 15:35 )   PT: 11.4 sec;   INR: 1.00 ratio         PTT - ( 09 Nov 2019 15:35 )  PTT:39.5 sec  Transfusion: [ ] PRBC	[ ] Platelets	[ ] FFP	[ ] Cryoprecipitate      INFECTIOUS DISEASES    RECENT CULTURES:      ENDOCRINE    CAPILLARY BLOOD GLUCOSE          PATIENT CARE ACCESS DEVICES:  [x ] Peripheral IV  [ ] Central Venous Line	[ ] R	[ ] L	[ ] IJ	[ ] Fem	[ ] SC	Placed:   [ ] Arterial Line		[ ] R	[ ] L	[ ] Fem	[ ] Rad	[ ] Ax	Placed:   [ ] PICC:					[ ] Mediport  [ ] Urinary Catheter, Date Placed:   [x] Necessity of urinary, arterial, and venous catheters discussed    OTHER MEDICATIONS:     IMAGING STUDIES:  < from: CT Head No Cont (11.09.19 @ 16:05) >    IMPRESSION:     Limited evaluation secondary to motion and streak artifact.    CT BRAIN:     No acute intracranial hemorrhage, mass effect or midline shift.     No displaced calvarial fracture. Large left frontal scalp hematoma.    Age-indeterminate lacunar infarct involving the left thalamus, new since   9/27/2019. Redemonstrated chronic infarcts in the right frontal lobe and   left corona radiata.    CT CERVICAL SPINE:   No acute fracture or subluxation. Moderate multilevel cervical   spondylosis.    < from: CT Abdomen and Pelvis w/ IV Cont (11.09.19 @ 15:56) >  IMPRESSION:   Overall suboptimal evaluation due to persistent breathing artifacts.  No acute displaced fracture. Likely a subcutaneous soft tissue contusion   involving the right hip and proximal femur.  Aneurysmal dilatation of ascending thoracic aorta, measuring up to 4.3 cm.  Small complex cyst in the left ovary in this postmenopausal patient.   Pelvic ultrasound is advised for further evaluation on outpatient basis.    < from: CT Angio Lower Extremity w/ IV Cont, Right (11.09.19 @ 18:35) >  PROCEDURE DATE:  11/09/2019      ******PRELIMINARY REPORT******    ******PRELIMINARY REPORT******              INTERPRETATION:  Intramuscular hematoma in the quadriceps measures   approximately 4 x 6 x 10 cm in AP, transverse and craniocaudad   dimensions.  No evidence of active arterial contrast extravasation.    The common femoral, superficial femoral and popliteal arteries are patent   without stenosis.    There is two-vessel runoff.  The anterior and posterior tibial arteries   are patent without stenosis.  The peroneal artery is small and only   visualized to level of the distal tibial metadiaphysis. SICU CONSULT NOTE    HPI  LEANNA BRAMBILA is a 69F PMHx CVA on ASA, plavix, and Tlovenox BIBEMS from rehab facility after a suspected fall, initially brought to the main ED area but Level 2 called when she was hypoxic. Primary survey intact, GCS difficult to assess given baseline mental status of AXO x1. Secondary survey significant for left supraorbital edema and ecchymosis with superficial abrasion, RUQ abdominal tenderness, lower abdominal skin ecchymosis, RLE ecchymosis and swelling, RLE thigh firm, nontender, full ROM and full sensation. Primary survey intact. Secondary survey revealing ecchymosis of right thigh, firm, denies any TTP. DP/PT pulses palpable. Patient found to have H/H 6.6/21 on arrival was given 1u pRBC in ED with appropriate response to 8.1/25. CTA of RLE demonstrating intramuscular hematoma, patient transferred to SICU for hemorrhage watch.     PAST MEDICAL HISTORY: Alcohol dependence  Hyperlipemia  HTN (hypertension)  Depression      PAST SURGICAL HISTORY: Depression  Hyperlipemia  HTN (hypertension)      FAMILY HISTORY: No pertinent family history in first degree relatives      SOCIAL HISTORY:  hx of ETOH abuse   CODE STATUS: full code     HOME MEDICATIONS:  · 	clopidogrel 75 mg oral tablet: 1 tab(s) orally once a day  · 	atorvastatin 80 mg oral tablet: 1 tab(s) orally once a day (at bedtime)  · 	Aspirin Enteric Coated 81 mg oral delayed release tablet: 2 tab(s) orally once a day  · 	divalproex sodium 250 mg oral tablet, extended release: 2 tab(s) orally once a day  · 	enoxaparin: 0.7 milliliter(s) injectable every 12 hours  · 	SITagliptin 100 mg oral tablet: 1 tab(s) orally once a day  · 	Melatonin 5 mg oral capsule: 1 cap(s) orally once a day (at bedtime)  · 	HumaLOG KwikPen 100 units/mL injectable solution:   · 	Colace 100 mg oral capsule: 1 cap(s) orally once a day  · 	Flonase 50 mcg/inh nasal spray: 1 spray(s) nasal once a day  · 	loratadine 10 mg oral tablet: 1 tab(s) orally once a day  · 	Metamucil: 1 packet(s) orally once a day  · 	risperiDONE 0.25 mg oral tablet: 1 tab(s) orally once a day  · 	Vitamin D3 5000 intl units oral capsule: 1 cap(s) orally once a day  · 	metFORMIN 1000 mg oral tablet: 1 tab(s) orally 2 times a day (with meals)  · 	LORazepam 0.5 mg oral tablet: 1 tab(s) orally 4 times a day  · 	Senna 8.6 mg oral tablet: 1 tab(s) orally once a day (at bedtime)  · 	traZODone 100 mg oral tablet: 2 tab(s) orally once a day (at bedtime)    ALLERGIES: No Known Allergies      VITAL SIGNS:  ICU Vital Signs Last 24 Hrs  T(C): 37.9 (09 Nov 2019 19:30), Max: 37.9 (09 Nov 2019 19:30)  T(F): 100.2 (09 Nov 2019 19:30), Max: 100.2 (09 Nov 2019 19:30)  HR: 111 (09 Nov 2019 19:30) (110 - 114)  BP: 161/82 (09 Nov 2019 19:30) (140/82 - 161/82)  BP(mean): 113 (09 Nov 2019 19:30) (113 - 115)  ABP: --  ABP(mean): --  RR: 35 (09 Nov 2019 19:30) (18 - 35)  SpO2: 94% (09 Nov 2019 19:30) (94% - 100%)      NEURO  Exam: AAOx1, alert, awake, following commands   acetaminophen   Tablet .. 650 milliGRAM(s) Oral every 6 hours PRN Mild Pain (1 - 3)      RESPIRATORY  Mechanical Ventilation:     Exam: CTABL, no chest wall tenderness       CARDIOVASCULAR  VBG - ( 09 Nov 2019 19:42 )  pH: 7.42  /  pCO2: 46    /  pO2: 24    / HCO3: 29    / Base Excess: 4.7   /  SaO2: 37     Lactate: 2.4              Exam: RRR  Cardiac Rhythm:  normal sinus rhythm     GI/NUTRITION  Exam: soft, NT, mildly distended, mid-abdomen with ecchymosis   Diet: NPO       GENITOURINARY/RENAL  lactated ringers. 1000 milliLiter(s) IV Continuous <Continuous>      Weight (kg): 69.7 (11-09 @ 19:15)  11-09    136  |  98  |  16  ----------------------------<  132<H>  4.4   |  24  |  0.46<L>    Ca    9.8      09 Nov 2019 15:35    TPro  6.5  /  Alb  3.5  /  TBili  1.9<H>  /  DBili  x   /  AST  40  /  ALT  16  /  AlkPhos  40  11-09    [ ] Fowler catheter, indication: urine output monitoring in critically ill patient    HEMATOLOGIC  [ ] VTE Prophylaxis:                          8.1    10.87 )-----------( 326      ( 09 Nov 2019 17:59 )             25.0     PT/INR - ( 09 Nov 2019 15:35 )   PT: 11.4 sec;   INR: 1.00 ratio         PTT - ( 09 Nov 2019 15:35 )  PTT:39.5 sec  Transfusion: [ ] PRBC	[ ] Platelets	[ ] FFP	[ ] Cryoprecipitate      INFECTIOUS DISEASES    RECENT CULTURES:      ENDOCRINE    CAPILLARY BLOOD GLUCOSE          PATIENT CARE ACCESS DEVICES:  [x ] Peripheral IV  [ ] Central Venous Line	[ ] R	[ ] L	[ ] IJ	[ ] Fem	[ ] SC	Placed:   [ ] Arterial Line		[ ] R	[ ] L	[ ] Fem	[ ] Rad	[ ] Ax	Placed:   [ ] PICC:					[ ] Mediport  [ ] Urinary Catheter, Date Placed:   [x] Necessity of urinary, arterial, and venous catheters discussed    OTHER MEDICATIONS:     IMAGING STUDIES:  < from: CT Head No Cont (11.09.19 @ 16:05) >    IMPRESSION:     Limited evaluation secondary to motion and streak artifact.    CT BRAIN:     No acute intracranial hemorrhage, mass effect or midline shift.     No displaced calvarial fracture. Large left frontal scalp hematoma.    Age-indeterminate lacunar infarct involving the left thalamus, new since   9/27/2019. Redemonstrated chronic infarcts in the right frontal lobe and   left corona radiata.    CT CERVICAL SPINE:   No acute fracture or subluxation. Moderate multilevel cervical   spondylosis.    < from: CT Abdomen and Pelvis w/ IV Cont (11.09.19 @ 15:56) >  IMPRESSION:   Overall suboptimal evaluation due to persistent breathing artifacts.  No acute displaced fracture. Likely a subcutaneous soft tissue contusion   involving the right hip and proximal femur.  Aneurysmal dilatation of ascending thoracic aorta, measuring up to 4.3 cm.  Small complex cyst in the left ovary in this postmenopausal patient.   Pelvic ultrasound is advised for further evaluation on outpatient basis.    < from: CT Angio Lower Extremity w/ IV Cont, Right (11.09.19 @ 18:35) >  PROCEDURE DATE:  11/09/2019      ******PRELIMINARY REPORT******    ******PRELIMINARY REPORT******              INTERPRETATION:  Intramuscular hematoma in the quadriceps measures   approximately 4 x 6 x 10 cm in AP, transverse and craniocaudad   dimensions.  No evidence of active arterial contrast extravasation.    The common femoral, superficial femoral and popliteal arteries are patent   without stenosis.    There is two-vessel runoff.  The anterior and posterior tibial arteries   are patent without stenosis.  The peroneal artery is small and only   visualized to level of the distal tibial metadiaphysis.

## 2019-11-09 NOTE — PROVIDER CONTACT NOTE (OTHER) - ACTION/TREATMENT ORDERED:
Pt assessed by SICU team. No intervention at this time. Continue to monitor temp, RLE, and abdomen. Notify provider for further changes.

## 2019-11-09 NOTE — ED PROVIDER NOTE - PHYSICAL EXAMINATION
Attending Tresa Taylor: Gen: ill appearing, heent: atrauamtic, eomi, perrla, mmm, op pink, conjucntiva paleuvula midline, neck; nttp, no nuchal rigidity, chest: nttp, no crepitus, cv: tachycardic, lungs: ctab, abd: soft,ecchymoes to abdomen with mild ttp, no peritoneal signs, +BS, no guarding, ext: sig ecchymoes to right leg neg homans, skin: paleh, neuro: awake and alert, following commands, speech clear, sensation and strength intact, no focal deficits

## 2019-11-09 NOTE — CHART NOTE - NSCHARTNOTEFT_GEN_A_CORE
admitted 9/27-10/1/2019 from [facility] rehab for left MCA CVA recrudescence. she had gotten TPA on 9/17/2019 at [facility] for left MCA embolic CVA.     BP stable, but tachycardic  she has fullness and a bruise of her right thigh. it is tender, but there is no deformity.  also has bruising across lower abdomen from therapeutic LMWH injections.  currently being transfused 2u RBC    HCO3 = 24  lactate = 3.7      hemorrhagic shock secondary to right thigh hematoma  -give protamine since she is on therapeutic LMWH  -CTA RLE  -admit to SICU for hemorrhage watch protocol    macrocytic anemia  -check B12, folate, iron studies    Critical Care Time - 35 minutes    Critical Care Services - ATLS re-evaluation, monitor vital signs, review radiologic studies upon acquisition, coordinate care with ER and SICU, documentation admitted 9/27-10/1/2019 from [facility] rehab for left MCA CVA recrudescence. she had gotten TPA on 9/17/2019 at [facility] for left MCA embolic CVA.     BP stable, but tachycardic  she has fullness and a bruise of her right thigh. it is tender, but there is no deformity.  also has bruising across lower abdomen from therapeutic LMWH injections.  currently being transfused 2u RBC    HCO3 = 24  lactate = 3.7      hemorrhagic shock secondary to right thigh hematoma  -give protamine since she is on therapeutic LMWH  -CTA RLE  -admit to SICU for hemorrhage watch protocol    macrocytic anemia  -check B12, folate, iron studies    I have personally provided 35 minutes of critical care time concurrently with the resident, excluding time spent on separate procedures and time spent teaching.  Critical Care Services - ATLS re-evaluation, monitor vital signs, review radiologic studies upon acquisition, coordinate care with ER and SICU, documentation admitted 9/27-10/1/2019 from [facility] rehab for left MCA CVA recrudescence. she had gotten TPA on 9/17/2019 at [facility] for left MCA embolic CVA.     BP stable, but tachycardic  she has fullness and a bruise of her right thigh. it is tender, but there is no deformity.  also has bruising across lower abdomen from therapeutic LMWH injections.  currently being transfused 2u RBC    HCO3 = 24  lactate = 3.7      hemorrhagic shock secondary to right thigh hematoma  -give protamine since she is on therapeutic LMWH  -CTA RLE  -admit to SICU for hemorrhage watch protocol    macrocytic anemia  -check B12, folate, iron studies    cervical spine clearance  -no injury on CT c-spine  -no c-spine tenderness or limit in full active ROM  -c-collar removed      I have personally provided 35 minutes of critical care time concurrently with the resident, excluding time spent on separate procedures and time spent teaching.  Critical Care Services - ATLS re-evaluation, monitor vital signs, review radiologic studies upon acquisition, coordinate care with ER and SICU, documentation

## 2019-11-09 NOTE — ED PROVIDER NOTE - CLINICAL SUMMARY MEDICAL DECISION MAKING FREE TEXT BOX
70 y/o female with multiple medical issues including CVA, dementia, presenting after unwitnessed fall with ecchymosis over lower abdomen (lovenox injections?) hematoma over L frontal area  (not actively bleeding) and R thigh ecchymosis. Level 2 trauma activated and patient was moved from room 35 to trauma D for further evaluation. Pan-scan, attempt lovenox reversal with protamine, maintain c-collar, labs, serial H/H. CTA of lower extremity. Re-assess. 70 y/o female with multiple medical issues including CVA, dementia, presenting after unwitnessed fall with ecchymosis over lower abdomen (lovenox injections?) hematoma over L frontal area  (not actively bleeding) and R thigh ecchymosis. Level 2 trauma activated and patient was moved from room 35 to trauma D for further evaluation. Pan-scan, attempt lovenox reversal with protamine, maintain c-collar, labs, serial H/H. CTA of lower extremity. Re-assess.  Attending Tresa Taylor: 70 y/o female with multiple medical issues on lovenox with h/o demential sent for report of unwitnissed fall. upon arrival pt found to be tachcyardic with stable BP. pale appearing. upon initial exam with diffuse ecchymoses to abdomen and swelling to right thigh pt moved to critical rooms for trauma evaluation. fast negative for free fluid. pt taken to CT scan which was negative for intra abdominal traumatic injury or head bleed. labs showed sig anemia. pt with swelling to right leg. on exam leg is swollen but soft. concern for active bleed. trauma notified and pt taken to cta. given PRBC. pt is on lovenox and given protamine for possible benefit of reversal. no evidence of femur fracture. plan for sicu for blood products and monitorig of leg to watch for developing compartment syndrome. plan to admit

## 2019-11-09 NOTE — ED ADULT NURSE REASSESSMENT NOTE - NS ED NURSE REASSESS COMMENT FT1
Straight cath performed as per MD order. 2 RN at bedside, sterile technique maintained, 50cc of carlie tinged urine output. Patient tolerated well.

## 2019-11-09 NOTE — H&P ADULT - NSHPLABSRESULTS_GEN_ALL_CORE
LABS                          6.6    10.35 )-----------( 365      ( 09 Nov 2019 15:35 )             21.0   11-09    136  |  98  |  16  ----------------------------<  132<H>  4.4   |  24  |  0.46<L>    Ca    9.8      09 Nov 2019 15:35    TPro  6.5  /  Alb  3.5  /  TBili  1.9<H>  /  DBili  x   /  AST  40  /  ALT  16  /  AlkPhos  40  11-09      CTH and C spine  IMPRESSION:     Limited evaluation secondary to motion and streak artifact.    CT BRAIN:     No acute intracranial hemorrhage, mass effect or midline shift.     No displaced calvarial fracture. Large left frontal scalp hematoma.    Age-indeterminate lacunar infarct involving the left thalamus, new since   9/27/2019. Redemonstrated chronic infarcts in the right frontal lobe and   left corona radiata.    CT CERVICAL SPINE:   No acute fracture or subluxation. Moderate multilevel cervical   spondylosis.    CTCAP  IMPRESSION:   Overall suboptimal evaluation due to persistent breathing artifacts.  No acute displaced fracture. Likely a subcutaneous soft tissue contusion   involving the right hip and proximal femur.  Aneurysmal dilatation of ascending thoracic aorta, measuring up to 4.3 cm.  Small complex cyst in the left ovary in this postmenopausal patient.   Pelvic ultrasound is advised for further evaluation on outpatient basis.

## 2019-11-10 LAB
ANION GAP SERPL CALC-SCNC: 9 MMOL/L — SIGNIFICANT CHANGE UP (ref 5–17)
APTT BLD: 26.5 SEC — LOW (ref 27.5–36.3)
BUN SERPL-MCNC: 10 MG/DL — SIGNIFICANT CHANGE UP (ref 7–23)
CALCIUM SERPL-MCNC: 9.1 MG/DL — SIGNIFICANT CHANGE UP (ref 8.4–10.5)
CHLORIDE SERPL-SCNC: 100 MMOL/L — SIGNIFICANT CHANGE UP (ref 96–108)
CO2 SERPL-SCNC: 25 MMOL/L — SIGNIFICANT CHANGE UP (ref 22–31)
CREAT SERPL-MCNC: 0.42 MG/DL — LOW (ref 0.5–1.3)
FOLATE SERPL-MCNC: >20 NG/ML — SIGNIFICANT CHANGE UP
GAS PNL BLDV: SIGNIFICANT CHANGE UP
GLUCOSE BLDC GLUCOMTR-MCNC: 113 MG/DL — HIGH (ref 70–99)
GLUCOSE BLDC GLUCOMTR-MCNC: 115 MG/DL — HIGH (ref 70–99)
GLUCOSE BLDC GLUCOMTR-MCNC: 130 MG/DL — HIGH (ref 70–99)
GLUCOSE BLDC GLUCOMTR-MCNC: 152 MG/DL — HIGH (ref 70–99)
GLUCOSE SERPL-MCNC: 87 MG/DL — SIGNIFICANT CHANGE UP (ref 70–99)
HCT VFR BLD CALC: 22.5 % — LOW (ref 34.5–45)
HCT VFR BLD CALC: 22.7 % — LOW (ref 34.5–45)
HCT VFR BLD CALC: 23.5 % — LOW (ref 34.5–45)
HGB BLD-MCNC: 7.4 G/DL — LOW (ref 11.5–15.5)
HGB BLD-MCNC: 7.5 G/DL — LOW (ref 11.5–15.5)
HGB BLD-MCNC: 7.7 G/DL — LOW (ref 11.5–15.5)
INR BLD: 1.02 RATIO — SIGNIFICANT CHANGE UP (ref 0.88–1.16)
IRON SATN MFR SERPL: 161 UG/DL — HIGH (ref 30–160)
IRON SATN MFR SERPL: 52 % — HIGH (ref 14–50)
MAGNESIUM SERPL-MCNC: 1.4 MG/DL — LOW (ref 1.6–2.6)
MCHC RBC-ENTMCNC: 32.8 GM/DL — SIGNIFICANT CHANGE UP (ref 32–36)
MCHC RBC-ENTMCNC: 32.9 GM/DL — SIGNIFICANT CHANGE UP (ref 32–36)
MCHC RBC-ENTMCNC: 33 GM/DL — SIGNIFICANT CHANGE UP (ref 32–36)
MCHC RBC-ENTMCNC: 34.2 PG — HIGH (ref 27–34)
MCHC RBC-ENTMCNC: 34.9 PG — HIGH (ref 27–34)
MCHC RBC-ENTMCNC: 35.1 PG — HIGH (ref 27–34)
MCV RBC AUTO: 104.4 FL — HIGH (ref 80–100)
MCV RBC AUTO: 105.6 FL — HIGH (ref 80–100)
MCV RBC AUTO: 106.6 FL — HIGH (ref 80–100)
NRBC # BLD: 1 /100 WBCS — HIGH (ref 0–0)
NRBC # BLD: 2 /100 WBCS — HIGH (ref 0–0)
NRBC # BLD: 2 /100 WBCS — HIGH (ref 0–0)
PHOSPHATE SERPL-MCNC: 3.7 MG/DL — SIGNIFICANT CHANGE UP (ref 2.5–4.5)
PLATELET # BLD AUTO: 309 K/UL — SIGNIFICANT CHANGE UP (ref 150–400)
PLATELET # BLD AUTO: 311 K/UL — SIGNIFICANT CHANGE UP (ref 150–400)
PLATELET # BLD AUTO: 319 K/UL — SIGNIFICANT CHANGE UP (ref 150–400)
POTASSIUM SERPL-MCNC: 4 MMOL/L — SIGNIFICANT CHANGE UP (ref 3.5–5.3)
POTASSIUM SERPL-SCNC: 4 MMOL/L — SIGNIFICANT CHANGE UP (ref 3.5–5.3)
PROTHROM AB SERPL-ACNC: 11.6 SEC — SIGNIFICANT CHANGE UP (ref 10–12.9)
RBC # BLD: 2.11 M/UL — LOW (ref 3.8–5.2)
RBC # BLD: 2.15 M/UL — LOW (ref 3.8–5.2)
RBC # BLD: 2.25 M/UL — LOW (ref 3.8–5.2)
RBC # FLD: 21.9 % — HIGH (ref 10.3–14.5)
RBC # FLD: 22 % — HIGH (ref 10.3–14.5)
RBC # FLD: 22.2 % — HIGH (ref 10.3–14.5)
SODIUM SERPL-SCNC: 134 MMOL/L — LOW (ref 135–145)
TIBC SERPL-MCNC: 310 UG/DL — SIGNIFICANT CHANGE UP (ref 220–430)
UIBC SERPL-MCNC: 149 UG/DL — SIGNIFICANT CHANGE UP (ref 110–370)
VIT B12 SERPL-MCNC: 485 PG/ML — SIGNIFICANT CHANGE UP (ref 232–1245)
WBC # BLD: 7 K/UL — SIGNIFICANT CHANGE UP (ref 3.8–10.5)
WBC # BLD: 8.17 K/UL — SIGNIFICANT CHANGE UP (ref 3.8–10.5)
WBC # BLD: 9.03 K/UL — SIGNIFICANT CHANGE UP (ref 3.8–10.5)
WBC # FLD AUTO: 7 K/UL — SIGNIFICANT CHANGE UP (ref 3.8–10.5)
WBC # FLD AUTO: 8.17 K/UL — SIGNIFICANT CHANGE UP (ref 3.8–10.5)
WBC # FLD AUTO: 9.03 K/UL — SIGNIFICANT CHANGE UP (ref 3.8–10.5)

## 2019-11-10 PROCEDURE — 71045 X-RAY EXAM CHEST 1 VIEW: CPT | Mod: 26

## 2019-11-10 PROCEDURE — 73700 CT LOWER EXTREMITY W/O DYE: CPT | Mod: 26,RT

## 2019-11-10 PROCEDURE — 99232 SBSQ HOSP IP/OBS MODERATE 35: CPT

## 2019-11-10 RX ORDER — INSULIN LISPRO 100/ML
VIAL (ML) SUBCUTANEOUS AT BEDTIME
Refills: 0 | Status: DISCONTINUED | OUTPATIENT
Start: 2019-11-10 | End: 2019-11-11

## 2019-11-10 RX ORDER — INSULIN LISPRO 100/ML
VIAL (ML) SUBCUTANEOUS
Refills: 0 | Status: DISCONTINUED | OUTPATIENT
Start: 2019-11-10 | End: 2019-11-11

## 2019-11-10 RX ORDER — ENOXAPARIN SODIUM 100 MG/ML
70 INJECTION SUBCUTANEOUS EVERY 12 HOURS
Refills: 0 | Status: DISCONTINUED | OUTPATIENT
Start: 2019-11-10 | End: 2019-11-14

## 2019-11-10 RX ORDER — MAGNESIUM SULFATE 500 MG/ML
2 VIAL (ML) INJECTION ONCE
Refills: 0 | Status: COMPLETED | OUTPATIENT
Start: 2019-11-10 | End: 2019-11-10

## 2019-11-10 RX ADMIN — RISPERIDONE 0.25 MILLIGRAM(S): 4 TABLET ORAL at 21:03

## 2019-11-10 RX ADMIN — Medication 2: at 12:23

## 2019-11-10 RX ADMIN — ATORVASTATIN CALCIUM 80 MILLIGRAM(S): 80 TABLET, FILM COATED ORAL at 21:07

## 2019-11-10 RX ADMIN — ENOXAPARIN SODIUM 70 MILLIGRAM(S): 100 INJECTION SUBCUTANEOUS at 21:22

## 2019-11-10 RX ADMIN — Medication 200 MILLIGRAM(S): at 21:03

## 2019-11-10 RX ADMIN — Medication 50 GRAM(S): at 04:28

## 2019-11-10 RX ADMIN — DIVALPROEX SODIUM 500 MILLIGRAM(S): 500 TABLET, DELAYED RELEASE ORAL at 21:03

## 2019-11-10 NOTE — PHYSICAL THERAPY INITIAL EVALUATION ADULT - GAIT DEVIATIONS NOTED, PT EVAL
increased time in double stance/decreased angelina/decreased velocity of limb motion/decreased weight-shifting ability

## 2019-11-10 NOTE — PROGRESS NOTE ADULT - ASSESSMENT
69F PMHx CVA on ASA, plavix, and Tlovenox BIBEMS from rehab facility after a suspected fall as level II trauma, imaging revealing right LE intramuscular hematoma     Neuro: AAOx1 at baseline   - monitor mental status   - Tylenol PRN for pain control  - c/w home Trazadone, divalproex, and risperidone     Resp:   - no active issues   - monitor O2 saturation     Cardiac:   - holding ASA and Plavix  - currently holding T. lovenox   - monitor vital signs     GI:   - NPO     Renal:   - monitor Is and Os  - IVF   - monitor electrolytes and replete     Heme:   - Q 6 CBC  - holding DVT ppx   - Holding ASA/Plavix    ID:   - no active issues     Endo:   - FSG  - ISS     Dispo: SICU

## 2019-11-10 NOTE — PHYSICAL THERAPY INITIAL EVALUATION ADULT - GENERAL OBSERVATIONS, REHAB EVAL
Pt found semi supine +O2 +tele +pulse ox +IVs +BP cuff +external catheter +b/l LE compression cuffs., 1:1 present

## 2019-11-10 NOTE — PHYSICAL THERAPY INITIAL EVALUATION ADULT - PERTINENT HX OF CURRENT PROBLEM, REHAB EVAL
68 yo F h/o dementia and recent CVA on ASA, BIBEMS from rehab facility after a suspected fall (found on ground), initially brought to the main ED area but Level 2 called when she was hypoxic. XRay Femur/ Pelvis 11/9: neg. CT C-spine and brain 11/9: neg for acute hemorrhage/injury. Large left frontal scalp hematoma.Age-indeterminate lacunar infarct involving the left thalamus, new since 9/27/2019.

## 2019-11-10 NOTE — PHYSICAL THERAPY INITIAL EVALUATION ADULT - PRECAUTIONS/LIMITATIONS, REHAB EVAL
CONT: Redemonstrated chronic infarcts in the right frontal lobe and left corona radiata. CT RLE 11/9:  Intramuscular hematoma in the quadriceps measures approximately 4 x 6 x 10 cm in AP, transverse and craniocaudad dimensions.  No evidence of active arterial contrast extravasation. fall precautions/CONT: Redemonstrated chronic infarcts in the right frontal lobe and left corona radiata. CT RLE 11/9:  Intramuscular hematoma in the quadriceps measures approximately 4 x 6 x 10 cm in AP, transverse and craniocaudad dimensions.  No evidence of active arterial contrast extravasation.

## 2019-11-10 NOTE — PROGRESS NOTE ADULT - SUBJECTIVE AND OBJECTIVE BOX
SUBJECTIVE: Pt seen and examined at bedside with chief resident.  Yesterday, she remained stable and off hemorrhage watch protocol, no evidence of active bleeding.   Advanced to regular diet, tolerated  Her lactate cleared yesterday    MEDICATIONS  (STANDING):  atorvastatin 80 milliGRAM(s) Oral at bedtime  diVALproex  milliGRAM(s) Oral daily  influenza   Vaccine 0.5 milliLiter(s) IntraMuscular once  insulin lispro (HumaLOG) corrective regimen sliding scale   SubCutaneous three times a day before meals  insulin lispro (HumaLOG) corrective regimen sliding scale   SubCutaneous at bedtime  lactated ringers. 1000 milliLiter(s) (75 mL/Hr) IV Continuous <Continuous>  risperiDONE  Disintegrating Tablet 0.25 milliGRAM(s) Oral daily  traZODone 200 milliGRAM(s) Oral at bedtime    MEDICATIONS  (PRN):  acetaminophen   Tablet .. 650 milliGRAM(s) Oral every 6 hours PRN Mild Pain (1 - 3)      OBJECTIVE:    Vital Signs Last 24 Hrs  T(C): 37.1 (10 Nov 2019 06:00), Max: 38 (09 Nov 2019 20:00)  T(F): 98.8 (10 Nov 2019 06:00), Max: 100.4 (09 Nov 2019 20:00)  HR: 94 (10 Nov 2019 12:00) (81 - 114)  BP: 144/66 (10 Nov 2019 12:00) (96/53 - 177/80)  BP(mean): 95 (10 Nov 2019 12:00) (69 - 115)  RR: 22 (10 Nov 2019 12:00) (13 - 35)  SpO2: 99% (10 Nov 2019 12:00) (93% - 100%)    General Appearance: Resting comfortably, no acute distress, head laceration without active bleeding   Chest: non-labored breathing, no respiratory distress  CV: Pulse regular presently  Abdomen: Soft, mildly tender to palpation, non-distended  Extremities: warm and well perfused, left leg larger than right, tense, can move all extremities spontaneously    I&O's Summary    09 Nov 2019 07:01  -  10 Nov 2019 07:00  --------------------------------------------------------  IN: 950 mL / OUT: 900 mL / NET: 50 mL    10 Nov 2019 07:01  -  10 Nov 2019 12:48  --------------------------------------------------------  IN: 930 mL / OUT: 400 mL / NET: 530 mL      I&O's Detail    09 Nov 2019 07:01  -  10 Nov 2019 07:00  --------------------------------------------------------  IN:    IV PiggyBack: 50 mL    lactated ringers.: 900 mL  Total IN: 950 mL    OUT:    Voided: 900 mL  Total OUT: 900 mL    Total NET: 50 mL      10 Nov 2019 07:01  -  10 Nov 2019 12:48  --------------------------------------------------------  IN:    lactated ringers.: 450 mL    Oral Fluid: 480 mL  Total IN: 930 mL    OUT:    Voided: 400 mL  Total OUT: 400 mL    Total NET: 530 mL            LABS:                        7.7    7.00  )-----------( 319      ( 10 Nov 2019 09:15 )             23.5     11-10    134<L>  |  100  |  10  ----------------------------<  87  4.0   |  25  |  0.42<L>    Ca    9.1      10 Nov 2019 03:19  Phos  3.7     11-10  Mg     1.4     11-10    TPro  6.5  /  Alb  3.5  /  TBili  1.9<H>  /  DBili  x   /  AST  40  /  ALT  16  /  AlkPhos  40  11-09    PT/INR - ( 10 Nov 2019 03:19 )   PT: 11.6 sec;   INR: 1.02 ratio         PTT - ( 10 Nov 2019 03:19 )  PTT:26.5 sec  Urinalysis Basic - ( 09 Nov 2019 17:55 )    Color: Yellow / Appearance: Clear / SG: >1.050 / pH: x  Gluc: x / Ketone: Small  / Bili: Negative / Urobili: >8mg/dL   Blood: x / Protein: 30 mg/dL / Nitrite: Negative   Leuk Esterase: Negative / RBC: 5 /hpf / WBC 7 /HPF   Sq Epi: x / Non Sq Epi: 6 /hpf / Bacteria: Negative        RADIOLOGY & ADDITIONAL STUDIES:

## 2019-11-10 NOTE — PROGRESS NOTE ADULT - ASSESSMENT
69F PMHx CVA on ASA, plavix, and Tlovenox BIBEMS from rehab facility after a suspected fall, initially brought to the main ED area but Level 2 called when she was hypoxic. Primary survey intact, GCS difficult to assess given baseline mental status of AXO x1. Secondary survey significant for left supraorbital edema and ecchymosis with superficial abrasion, RUQ abdominal tenderness, lower abdominal skin ecchymosis, RLE ecchymosis and swelling, RLE thigh firm, nontender, full ROM and full sensation.     Of note, patient had a recent fall in which she had a hematoma of the R thigh per her brother. CTA negative for extravasation. H/H stabilized, lactic acidosis resolved    Plan:  - Regular diet  - Continue to monitor hematoma  - Continue to hold anti platelets and anticoagulants     ACS & Trauma, [id number]

## 2019-11-10 NOTE — PROGRESS NOTE ADULT - SUBJECTIVE AND OBJECTIVE BOX
HISTORY  69y Female PMHx CVA on ASA, plavix, and Tlovenox BIBEMS from rehab facility after a suspected fall, initially brought to the main ED area but Level 2 called when she was hypoxic. Primary survey intact, GCS difficult to assess given baseline mental status of AXO x1. Secondary survey significant for left supraorbital edema and ecchymosis with superficial abrasion, RUQ abdominal tenderness, lower abdominal skin ecchymosis, RLE ecchymosis and swelling, RLE thigh firm, nontender, full ROM and full sensation. Primary survey intact. Secondary survey revealing ecchymosis of right thigh, firm, denies any TTP. DP/PT pulses palpable. Patient found to have H/H 6.6/21 on arrival was given 1u pRBC in ED with appropriate response to 8.1/25. CTA of RLE demonstrating intramuscular hematoma, patient transferred to SICU for hemorrhage watch.         24 HOUR EVENTS:  - Cleared off hemorrhage watch protocol  - Lactate cleared.   - No overnight events    SUBJECTIVE/ROS:  [ ] A ten-point review of systems was otherwise negative except as noted.  [ ] Due to altered mental status/intubation, subjective information were not able to be obtained from the patient. History was obtained, to the extent possible, from review of the chart and collateral sources of information.      NEURO  Exam: awake, alert, oriented x1  Meds: acetaminophen   Tablet .. 650 milliGRAM(s) Oral every 6 hours PRN Mild Pain (1 - 3)  diVALproex  milliGRAM(s) Oral daily  risperiDONE  Disintegrating Tablet 0.25 milliGRAM(s) Oral daily  traZODone 200 milliGRAM(s) Oral at bedtime    [x] Adequacy of sedation and pain control has been assessed and adjusted      RESPIRATORY  RR: 16 (11-10-19 @ 02:00) (16 - 35)  SpO2: 99% (11-10-19 @ 02:00) (93% - 100%)  Exam: unlabored, clear to auscultation bilaterally  Mechanical Ventilation: none  [N/A] Extubation Readiness Assessed  Meds: none      CARDIOVASCULAR  HR: 88 (11-10-19 @ 02:00) (85 - 114)  BP: 104/56 (11-10-19 @ 02:00) (96/53 - 161/82)  BP(mean): 77 (11-10-19 @ 02:00) (69 - 115)  VBG - ( 09 Nov 2019 20:49 )  pH: 7.43  /  pCO2: 43    /  pO2: 37    / HCO3: 28    / Base Excess: 3.8   /  SaO2: 68     Lactate: 1.8    Exam: regular rate and rhythm  Cardiac Rhythm: sinus  Perfusion     [x]Adequate   [ ]Inadequate  Mentation   [x]Normal       [ ]Reduced  Extremities  [x]Warm         [ ]Cool  Volume Status [ ]Hypervolemic [x]Euvolemic [ ]Hypovolemic  Meds: none      GI/NUTRITION  Exam: soft, nontender, nondistended  Diet: regular diet   Meds: none    GENITOURINARY  I&O's Detail    11-09 @ 07:01  -  11-10 @ 02:50  --------------------------------------------------------  IN:    lactated ringers.: 450 mL  Total IN: 450 mL    OUT:    Voided: 250 mL  Total OUT: 250 mL    Total NET: 200 mL        Weight (kg): 69.7 (11-09 @ 19:15)  11-09    136  |  98  |  16  ----------------------------<  132<H>  4.4   |  24  |  0.46<L>    Ca    9.8      09 Nov 2019 15:35    TPro  6.5  /  Alb  3.5  /  TBili  1.9<H>  /  DBili  x   /  AST  40  /  ALT  16  /  AlkPhos  40  11-09    [ ] Fowler catheter, indication: N/A  Meds: lactated ringers. 1000 milliLiter(s) IV Continuous <Continuous>        HEMATOLOGIC  Meds: none   [x] VTE Prophylaxis                        8.0    10.36 )-----------( 330      ( 09 Nov 2019 20:53 )             24.0     PT/INR - ( 09 Nov 2019 15:35 )   PT: 11.4 sec;   INR: 1.00 ratio         PTT - ( 09 Nov 2019 15:35 )  PTT:39.5 sec  Transfusion     [ ] PRBC   [ ] Platelets   [ ] FFP   [ ] Cryoprecipitate      INFECTIOUS DISEASES  WBC Count: 10.36 K/uL (11-09 @ 20:53)  WBC Count: 9.96 K/uL (11-09 @ 19:47)  WBC Count: 10.87 K/uL (11-09 @ 17:59)  WBC Count: 10.35 K/uL (11-09 @ 15:35)    RECENT CULTURES:    Meds: influenza   Vaccine 0.5 milliLiter(s) IntraMuscular once        ENDOCRINE  CAPILLARY BLOOD GLUCOSE        Meds: atorvastatin 80 milliGRAM(s) Oral at bedtime        ACCESS DEVICES:  [x] Peripheral IV  [ ] Central Venous Line	[ ] R	[ ] L	[ ] IJ	[ ] Fem	[ ] SC	Placed:   [ ] Arterial Line		[ ] R	[ ] L	[ ] Fem	[ ] Rad	[ ] Ax	Placed:   [ ] PICC:					[ ] Mediport  [ ] Urinary Catheter, Date Placed:   [x] Necessity of urinary, arterial, and venous catheters discussed    OTHER MEDICATIONS: none       CODE STATUS: full code       IMAGING: < from: CT Angio Lower Extremity w/ IV Cont, Right (11.09.19 @ 18:35) >  Intramuscular hematoma in the quadriceps measures   approximately 4 x 6 x 10 cm in AP, transverse and craniocaudad   dimensions.  No evidence of active arterial contrast extravasation.    The common femoral, superficial femoral and popliteal arteries are patent   without stenosis.    There is two-vessel runoff.  The anterior and posterior tibial arteries   are patent without stenosis.  The peroneal artery is small and only   visualized to level of the distal tibial metadiaphysis.      < end of copied text >

## 2019-11-11 LAB
ANION GAP SERPL CALC-SCNC: 12 MMOL/L — SIGNIFICANT CHANGE UP (ref 5–17)
APTT BLD: 32 SEC — SIGNIFICANT CHANGE UP (ref 27.5–36.3)
BUN SERPL-MCNC: 8 MG/DL — SIGNIFICANT CHANGE UP (ref 7–23)
CALCIUM SERPL-MCNC: 9.2 MG/DL — SIGNIFICANT CHANGE UP (ref 8.4–10.5)
CHLORIDE SERPL-SCNC: 102 MMOL/L — SIGNIFICANT CHANGE UP (ref 96–108)
CO2 SERPL-SCNC: 26 MMOL/L — SIGNIFICANT CHANGE UP (ref 22–31)
CREAT SERPL-MCNC: 0.4 MG/DL — LOW (ref 0.5–1.3)
GLUCOSE BLDC GLUCOMTR-MCNC: 131 MG/DL — HIGH (ref 70–99)
GLUCOSE SERPL-MCNC: 137 MG/DL — HIGH (ref 70–99)
HCT VFR BLD CALC: 23.2 % — LOW (ref 34.5–45)
HGB BLD-MCNC: 7.6 G/DL — LOW (ref 11.5–15.5)
INR BLD: 0.94 RATIO — SIGNIFICANT CHANGE UP (ref 0.88–1.16)
MAGNESIUM SERPL-MCNC: 1.8 MG/DL — SIGNIFICANT CHANGE UP (ref 1.6–2.6)
MCHC RBC-ENTMCNC: 32.8 GM/DL — SIGNIFICANT CHANGE UP (ref 32–36)
MCHC RBC-ENTMCNC: 34.7 PG — HIGH (ref 27–34)
MCV RBC AUTO: 105.9 FL — HIGH (ref 80–100)
NRBC # BLD: 2 /100 WBCS — HIGH (ref 0–0)
PHOSPHATE SERPL-MCNC: 3.5 MG/DL — SIGNIFICANT CHANGE UP (ref 2.5–4.5)
PLATELET # BLD AUTO: 318 K/UL — SIGNIFICANT CHANGE UP (ref 150–400)
POTASSIUM SERPL-MCNC: 3.8 MMOL/L — SIGNIFICANT CHANGE UP (ref 3.5–5.3)
POTASSIUM SERPL-SCNC: 3.8 MMOL/L — SIGNIFICANT CHANGE UP (ref 3.5–5.3)
PROTHROM AB SERPL-ACNC: 10.8 SEC — SIGNIFICANT CHANGE UP (ref 10–12.9)
RBC # BLD: 2.19 M/UL — LOW (ref 3.8–5.2)
RBC # FLD: 21.8 % — HIGH (ref 10.3–14.5)
SODIUM SERPL-SCNC: 140 MMOL/L — SIGNIFICANT CHANGE UP (ref 135–145)
WBC # BLD: 7.91 K/UL — SIGNIFICANT CHANGE UP (ref 3.8–10.5)
WBC # FLD AUTO: 7.91 K/UL — SIGNIFICANT CHANGE UP (ref 3.8–10.5)

## 2019-11-11 PROCEDURE — 99231 SBSQ HOSP IP/OBS SF/LOW 25: CPT

## 2019-11-11 PROCEDURE — 99232 SBSQ HOSP IP/OBS MODERATE 35: CPT

## 2019-11-11 PROCEDURE — 99222 1ST HOSP IP/OBS MODERATE 55: CPT | Mod: GC

## 2019-11-11 RX ORDER — METOPROLOL TARTRATE 50 MG
50 TABLET ORAL
Refills: 0 | Status: DISCONTINUED | OUTPATIENT
Start: 2019-11-11 | End: 2019-11-14

## 2019-11-11 RX ORDER — POTASSIUM CHLORIDE 20 MEQ
40 PACKET (EA) ORAL ONCE
Refills: 0 | Status: COMPLETED | OUTPATIENT
Start: 2019-11-11 | End: 2019-11-11

## 2019-11-11 RX ORDER — MAGNESIUM SULFATE 500 MG/ML
2 VIAL (ML) INJECTION ONCE
Refills: 0 | Status: COMPLETED | OUTPATIENT
Start: 2019-11-11 | End: 2019-11-11

## 2019-11-11 RX ORDER — ASPIRIN/CALCIUM CARB/MAGNESIUM 324 MG
1 TABLET ORAL
Qty: 0 | Refills: 0 | DISCHARGE

## 2019-11-11 RX ORDER — ASPIRIN/CALCIUM CARB/MAGNESIUM 324 MG
81 TABLET ORAL DAILY
Refills: 0 | Status: DISCONTINUED | OUTPATIENT
Start: 2019-11-11 | End: 2019-11-14

## 2019-11-11 RX ORDER — THIAMINE MONONITRATE (VIT B1) 100 MG
1 TABLET ORAL
Qty: 0 | Refills: 0 | DISCHARGE

## 2019-11-11 RX ORDER — ASCORBIC ACID 60 MG
1 TABLET,CHEWABLE ORAL
Qty: 0 | Refills: 0 | DISCHARGE

## 2019-11-11 RX ADMIN — ENOXAPARIN SODIUM 70 MILLIGRAM(S): 100 INJECTION SUBCUTANEOUS at 21:25

## 2019-11-11 RX ADMIN — Medication 50 MILLIGRAM(S): at 21:25

## 2019-11-11 RX ADMIN — Medication 200 MILLIGRAM(S): at 21:25

## 2019-11-11 RX ADMIN — ENOXAPARIN SODIUM 70 MILLIGRAM(S): 100 INJECTION SUBCUTANEOUS at 09:31

## 2019-11-11 RX ADMIN — DIVALPROEX SODIUM 500 MILLIGRAM(S): 500 TABLET, DELAYED RELEASE ORAL at 12:15

## 2019-11-11 RX ADMIN — Medication 81 MILLIGRAM(S): at 12:17

## 2019-11-11 RX ADMIN — ATORVASTATIN CALCIUM 80 MILLIGRAM(S): 80 TABLET, FILM COATED ORAL at 21:25

## 2019-11-11 RX ADMIN — Medication 50 GRAM(S): at 02:21

## 2019-11-11 RX ADMIN — Medication 50 MILLIGRAM(S): at 09:28

## 2019-11-11 RX ADMIN — Medication 40 MILLIEQUIVALENT(S): at 02:21

## 2019-11-11 NOTE — CONSULT NOTE ADULT - ASSESSMENT
68y/o lady who presents to the facility from rehab facility on the evening of 11/9/19 after suffering a fall w/ headstrike. 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  Neurology consulted to evaluate risk of therapeutic lovenox in patient s/p recent CVA. Of note, patient currently sustaining hematoma on the L supraorbital ridge s/p fall as well as hematoma of the R thigh. Patients Hgb on arrival 6.6, requiring multiple transfusions, currently at 7.6. CT Head noncontrast obtained here demonstrates: Age-indeterminate lacunar infarct involving the left thalamus (posterior), new since 9/27/2019. MRI Brain w/o from 9/19 to my eye demonstrates possible evidence of multi-infarctions in the L thalamus that may suggest that most recent CT findings illustrate the natural evolution of prior stroke. Patient possesses an MRS of 4 as per prior admission and patient is typically awake and oriented to person with occasional orientation to place and no orientation to time. Current physical exam remains congruent with recent infarcts. Current intracranial CT findings in the setting of recent history of stroke (seemingly sustained > 30 days ago) suggest greater benefit than risk with continuing therapeutic doses of lovenox to treat PE. One may also argue that given possibly suspected embolic etiology of prior stroke, Lovenox would be an adequate treatment in this case for secondary stroke prevention. This is however without respect specifically to recent drop in H&H, L supraorbital and RLE hematomas of which a risk-benefit analysis regarding continuation of AC is best determined by primary team and respective consult services.    Plan:    C/w therapeutic Lovenox for PE and prior hx of suspected Embolic strokes (vs Symptomatic Intracranial Atherosclerosis)  STAT Repeat CT Head Noncontrast w/ mental status change  Can consider repeat MRI Brain w/o to better discern nature of most recent CT Head Noncontrast findings              TO BE DISCUSSED W/ ATTENDING 68y/o lady who presents to the facility from rehab facility on the evening of 11/9/19 after suffering a fall w/ headstrike. 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  Neurology consulted to evaluate risk of therapeutic lovenox in patient s/p recent CVA. Of note, patient currently sustaining hematoma on the L supraorbital ridge s/p fall as well as hematoma of the R thigh. Patients Hgb on arrival 6.6, requiring multiple transfusions, currently at 7.6. CT Head noncontrast obtained here demonstrates: Age-indeterminate lacunar infarct involving the left thalamus (posterior), new since 9/27/2019. MRI Brain w/o from 9/19 to my eye demonstrates possible evidence of multi-infarctions in the L thalamus that may suggest that most recent CT findings illustrate the natural evolution of prior stroke. Patient possesses an MRS of 4 as per prior admission and patient is typically awake and oriented to person with occasional orientation to place and no orientation to time. Current physical exam remains congruent with recent infarcts. Current intracranial CT findings in the setting of recent history of stroke (seemingly sustained > 30 days ago) suggest greater benefit than risk with continuing therapeutic doses of lovenox to treat PE. One may also argue that given possibly suspected embolic etiology of prior stroke, Lovenox would be an adequate treatment in this case for secondary stroke prevention. This is however without respect specifically to recent drop in H&H, L supraorbital and RLE hematomas of which a risk-benefit analysis regarding continuation of AC is best determined by primary team and respective consult services.    Plan:    C/w therapeutic Lovenox for PE and prior hx of suspected Embolic strokes (vs Symptomatic Intracranial Atherosclerosis requiring ASA/Plavix: defer triple therapy due to risk>benefit)  STAT Repeat CT Head Noncontrast w/ mental status change  Can consider repeat MRI Brain w/o to better discern nature of most recent CT Head Noncontrast findings              TO BE DISCUSSED W/ ATTENDING

## 2019-11-11 NOTE — CONSULT NOTE ADULT - ATTENDING COMMENTS
After Visit Summary   8/21/2018    Marina Cummins    MRN: [id number]           Patient Information     Date Of Birth          1937        Visit Information        Provider Department      8/21/2018 9:30 AM Cody Obando MD [facility]        Today's Diagnoses     Routine general medical examination at a health care facility    -  1    Podagra        Chronic renal impairment, stage 3 (moderate)        Hypertension goal BP (blood pressure) < 140/90        Hyperlipidemia LDL goal <100        Peripheral vascular disease (H)        Other emphysema (H)        Hypothyroidism, unspecified type        Chronic pain syndrome        Personal history of tobacco use, presenting hazards to health        Right lower quadrant pain          Care Instructions    I will let you know your lab results.   Consider getting the shingles vaccine (Shingrix) at your pharmacy.   Set up the CT of your abdomen and chest.               Follow-ups after your visit        Additional Services     RADIOLOGY REFERRAL       Your provider has referred you to: OUTSIDE RADIOLOGY.                                                                                                 PLEASE DO A CT OF THE CHEST AND ABDOMEN AND PELVIS ; USE ORAL AND NO IV CONTRAST.             SHE HAS RLQ PAIN, AND CHRONIC COUGH, WITH A HEAVY SMOKING HISTORY.     Please be aware that coverage of these services is subject to the terms and limitations of your health insurance plan.  Call member services at your health plan with any benefit or coverage questions.      Please bring the following to your appointment:    >>   Any x-rays, CTs or MRIs which have been performed.  Contact the facility where they were done to arrange for  prior to your scheduled appointment.    >>   List of current medications   >>   This referral request   >>   Any documents/labs given to you for this referral    Prior authorization is required 
"for MRI/MRA, CT, Dexa Scans and Worker's Compensation cases.                  Who to contact     If you have questions or need follow up information about today's clinic visit or your schedule please contact [facility] directly at [phone].  Normal or non-critical lab and imaging results will be communicated to you by MyChart, letter or phone within 4 business days after the clinic has received the results. If you do not hear from us within 7 days, please contact the clinic through [employer] or phone. If you have a critical or abnormal lab result, we will notify you by phone as soon as possible.  Submit refill requests through [employer] or call your pharmacy and they will forward the refill request to us. Please allow 3 business days for your refill to be completed.          Additional Information About Your Visit        [employer] Information     [employer] gives you secure access to your electronic health record. If you see a primary care provider, you can also send messages to your care team and make appointments. If you have questions, please call your primary care clinic.  If you do not have a primary care provider, please call [phone] and they will assist you.        Care EveryWhere ID     This is your Care EveryWhere ID. This could be used by other organizations to access your [facility] medical records  [id number]        Your Vitals Were     Pulse Temperature Respirations Height Pulse Oximetry Breastfeeding?    71 97.8  F (36.6  C) 20 5' 5.5\" (1.664 m) 99% No    BMI (Body Mass Index)                   30.15 kg/m2            Blood Pressure from Last 3 Encounters:   08/21/18 130/62   07/12/17 124/62   08/30/16 122/68    Weight from Last 3 Encounters:   08/21/18 184 lb (83.5 kg)   07/12/17 182 lb (82.6 kg)   08/30/16 185 lb (83.9 kg)              We Performed the Following     CBC with platelets and differential     Comprehensive metabolic panel (BMP + Alb, Alk Phos, ALT, AST, "
Total. Bili, TP)     Lipid Profile (Chol, Trig, HDL, LDL calc)     RADIOLOGY REFERRAL     TSH with free T4 reflex     UA reflex to Microscopic     Uric acid          Today's Medication Changes          These changes are accurate as of 8/21/18 10:31 AM.  If you have any questions, ask your nurse or doctor.               Start taking these medicines.        Dose/Directions    amoxicillin-clavulanate 875-125 MG per tablet   Commonly known as:  AUGMENTIN   Used for:  Other emphysema (H)   Started by:  Cody Obando MD        Dose:  1 tablet   Take 1 tablet by mouth 2 times daily   Quantity:  14 tablet   Refills:  1       predniSONE 20 MG tablet   Commonly known as:  DELTASONE   Used for:  Podagra   Started by:  Cody Obando MD        TAKE ONE TABLET BY MOUTH TWICE DAILY ONLY WHEN NEEDED FOR GOUT   Quantity:  10 tablet   Refills:  3         These medicines have changed or have updated prescriptions.        Dose/Directions    levothyroxine 125 MCG tablet   Commonly known as:  SYNTHROID/LEVOTHROID   This may have changed:  See the new instructions.   Used for:  Hypothyroidism, unspecified type   Changed by:  Cody Obando MD        TAKE 1 TABLET 6 DAYS A WEEK AND TAKE 1/2 TABLET  ON SEVENTH DAY   Quantity:  85 tablet   Refills:  3       tiZANidine 2 MG tablet   Commonly known as:  ZANAFLEX   This may have changed:  See the new instructions.   Used for:  Chronic pain syndrome   Changed by:  Cody Obando MD        Dose:  2 mg   Take 1 tablet (2 mg) by mouth 3 times daily   Quantity:  270 tablet   Refills:  3            Where to get your medicines      These medications were sent to [facility] - [place], [place] - [address]   [address], [facility] [address]     Phone:  [phone]     levothyroxine 125 MCG tablet    metoprolol succinate 25 MG 24 hr tablet    simvastatin 40 MG tablet    tiZANidine 2 MG tablet         Some of these will need a paper prescription and others can be 
bought over the counter.  Ask your nurse if you have questions.     Bring a paper prescription for each of these medications     albuterol 108 (90 Base) MCG/ACT inhaler    amoxicillin-clavulanate 875-125 MG per tablet    predniSONE 20 MG tablet                Primary Care Provider Office Phone # Fax #    Cody Obando [phone] [address] [facility] [address]        Equal Access to Services     KRYS BRUNNER : Hadii aad ku hadasho Soomaali, waaxda luqadaha, qaybta kaalmada adeegyada, waxay idiin hayaan adeeg kharash la'annelisen . So [facility] [phone].    ATENCIÓN: Si habla español, tiene a bolaños disposición servicios gratuitos de asistencia lingüística. [facility] [phone].    We comply with applicable federal civil rights laws and [place] laws. We do not discriminate on the basis of race, color, national origin, age, disability, sex, sexual orientation, or gender identity.            Thank you!     Thank you for choosing [facility]  for your care. Our goal is always to provide you with excellent care. Hearing back from our patients is one way we can continue to improve our services. Please take a few minutes to complete the written survey that you may receive in the mail after your visit with us. Thank you!             Your Updated Medication List - Protect others around you: Learn how to safely use, store and throw away your medicines at www.disposemymeds.org.          This list is accurate as of 8/21/18 10:31 AM.  Always use your most recent med list.                   Brand Name Dispense Instructions for use Diagnosis    albuterol 108 (90 Base) MCG/ACT inhaler    PROAIR HFA/PROVENTIL HFA/VENTOLIN HFA    1 Inhaler    Inhale 2 puffs into the lungs every 6 hours as needed for shortness of breath / dyspnea or wheezing    Other emphysema (H)       amoxicillin-clavulanate 875-125 MG per tablet    AUGMENTIN    14 tablet    Take 1 tablet by mouth 2 times 
daily    Other emphysema (H)       calcitRIOL 0.25 MCG capsule    ROCALTROL    90 capsule    Take 1 capsule by mouth every other day.        clopidogrel 75 MG tablet    PLAVIX    90 tablet    TAKE 1 TABLET EVERY DAY    Peripheral vascular disease (H)       desonide 0.05 % cream    DESOWEN          diclofenac 1 % Gel topical gel    VOLTAREN    100 g    Apply 2 grams to hands four times daily prn using enclosed dosing card.    Hand joint pain, unspecified laterality       EPINEPHrine 0.3 MG/0.3ML injection 2-pack    EPIPEN/ADRENACLICK/or ANY BX GENERIC EQUIV    1 each    Inject 0.3 mLs (0.3 mg) into the muscle once as needed for anaphylaxis    Bee sting reaction, initial encounter       famciclovir 500 MG tablet    FAMVIR    14 tablet    TAKE 1 TABLET TWICE DAILY    Herpes zoster without complication       ketoconazole 2 % cream    NIZORAL          LASIX 20 MG tablet   Generic drug:  furosemide     360 tablet    40 mg AM, 20 mg PM        levothyroxine 125 MCG tablet    SYNTHROID/LEVOTHROID    85 tablet    TAKE 1 TABLET 6 DAYS A WEEK AND TAKE 1/2 TABLET  ON SEVENTH DAY    Hypothyroidism, unspecified type       metoprolol succinate 25 MG 24 hr tablet    TOPROL-XL    90 tablet    TAKE 1 TABLET EVERY DAY    Hypertension goal BP (blood pressure) < 140/90       multivitamin  with lutein Caps per capsule      Take 1 capsule by mouth daily        predniSONE 20 MG tablet    DELTASONE    10 tablet    TAKE ONE TABLET BY MOUTH TWICE DAILY ONLY WHEN NEEDED FOR GOUT    Podagra       simvastatin 40 MG tablet    ZOCOR    90 tablet    TAKE 1 TABLET EVERY DAY    Hyperlipidemia LDL goal <100       spironolactone 50 MG tablet    ALDACTONE    30 tablet    Take 1 tablet (50 mg) by mouth daily        tiZANidine 2 MG tablet    ZANAFLEX    270 tablet    Take 1 tablet (2 mg) by mouth 3 times daily    Chronic pain syndrome         
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 Recent PE requires anticoagulation, but given patient's comorbidities and bleeding risk anticoagulation + DAPT is very high risk for her. Patient unable to provide accurate history. Review records, but prefer cutting out an antiplatelet agent, follow up with neurology.
VASCULAR NEUROLOGY ATTENDING  The patient is seen and examined the history and imaging are reviewed. I agree with the resident note unless otherwise noted. No neurologic contraindication given risk benefit to AC.

## 2019-11-11 NOTE — CONSULT NOTE ADULT - ASSESSMENT
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 Patient found to have H/H 6.6/21 on arrival was given 1u pRBC in ED with appropriate response to 8.1/25. CTA of RLE demonstrating intramuscular hematoma, patient transferred to SICU for hemorrhage watch.     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 Patient found to have H/H 6.6/21 on arrival was given 1u pRBC in ED with appropriate response to 8.1/25. CTA of RLE demonstrating intramuscular hematoma, patient transferred to SICU for hemorrhage watch.     -Will need record of previous hospitalization to carify if she had PE as her brother mentions  -After reviewing medical record can make recommendations  -Hematology will follow    Opal Berkowitz PGY4  Heme/Onc fellow

## 2019-11-11 NOTE — PROGRESS NOTE ADULT - SUBJECTIVE AND OBJECTIVE BOX
SUBJECTIVE: Pt seen and examined at bedside with chief resident.  Patient is still AxOx1 but remains not agitated   Therapeutic AC was restarted for previous DVT, but ASA and plavix remain held for hematoma  A CT scan of thigh to assess hematoma was obtained    MEDICATIONS  (STANDING):  aspirin  chewable 81 milliGRAM(s) Oral daily  atorvastatin 80 milliGRAM(s) Oral at bedtime  diVALproex  milliGRAM(s) Oral daily  enoxaparin Injectable 70 milliGRAM(s) SubCutaneous every 12 hours  influenza   Vaccine 0.5 milliLiter(s) IntraMuscular once  metoprolol tartrate 50 milliGRAM(s) Oral two times a day  traZODone 200 milliGRAM(s) Oral at bedtime    MEDICATIONS  (PRN):  acetaminophen   Tablet .. 650 milliGRAM(s) Oral every 6 hours PRN Mild Pain (1 - 3)      OBJECTIVE:    Vital Signs Last 24 Hrs  T(C): 36.8 (11 Nov 2019 11:00), Max: 37.1 (10 Nov 2019 19:24)  T(F): 98.2 (11 Nov 2019 11:00), Max: 98.8 (10 Nov 2019 19:24)  HR: 96 (11 Nov 2019 11:00) (77 - 104)  BP: 169/77 (11 Nov 2019 11:00) (126/62 - 191/84)  BP(mean): 110 (11 Nov 2019 11:00) (88 - 122)  RR: 22 (11 Nov 2019 11:00) (14 - 31)  SpO2: 99% (11 Nov 2019 11:00) (88% - 100%)    General Appearance: Resting comfortably, no acute distress, head laceration without active bleeding   Chest: non-labored breathing, no respiratory distress  CV: Pulse regular presently  Abdomen: Soft, mildly tender to palpation, non-distended  Extremities: warm and well perfused, tense, but improved, can move all extremities     I&O's Summary    10 Nov 2019 07:01  -  11 Nov 2019 07:00  --------------------------------------------------------  IN: 2580 mL / OUT: 2125 mL / NET: 455 mL    11 Nov 2019 07:01  -  11 Nov 2019 12:06  --------------------------------------------------------  IN: 0 mL / OUT: 450 mL / NET: -450 mL      I&O's Detail    10 Nov 2019 07:01  -  11 Nov 2019 07:00  --------------------------------------------------------  IN:    lactated ringers.: 900 mL    Oral Fluid: 1680 mL  Total IN: 2580 mL    OUT:    Voided: 2125 mL  Total OUT: 2125 mL    Total NET: 455 mL      11 Nov 2019 07:01  -  11 Nov 2019 12:06  --------------------------------------------------------  IN:  Total IN: 0 mL    OUT:    Voided: 450 mL  Total OUT: 450 mL    Total NET: -450 mL            LABS:                        7.6    7.91  )-----------( 318      ( 11 Nov 2019 01:06 )             23.2     11-11    140  |  102  |  8   ----------------------------<  137<H>  3.8   |  26  |  0.40<L>    Ca    9.2      11 Nov 2019 01:06  Phos  3.5     11-11  Mg     1.8     11-11    TPro  6.5  /  Alb  3.5  /  TBili  1.9<H>  /  DBili  x   /  AST  40  /  ALT  16  /  AlkPhos  40  11-09    PT/INR - ( 11 Nov 2019 01:06 )   PT: 10.8 sec;   INR: 0.94 ratio         PTT - ( 11 Nov 2019 01:06 )  PTT:32.0 sec  Urinalysis Basic - ( 09 Nov 2019 17:55 )    Color: Yellow / Appearance: Clear / SG: >1.050 / pH: x  Gluc: x / Ketone: Small  / Bili: Negative / Urobili: >8mg/dL   Blood: x / Protein: 30 mg/dL / Nitrite: Negative   Leuk Esterase: Negative / RBC: 5 /hpf / WBC 7 /HPF   Sq Epi: x / Non Sq Epi: 6 /hpf / Bacteria: Negative        RADIOLOGY & ADDITIONAL STUDIES:

## 2019-11-11 NOTE — CONSULT NOTE ADULT - SUBJECTIVE AND OBJECTIVE BOX
HPI:    Patient is a 68y/o lady who presents to the facility from rehab facility on the evening of 11/9/19 after suffering a fall w/ headstrike. Patient harbors a past medical history significant for L Ventral Thalamic Lacunar infarct s/p tpa in 9/19 on ASA and Plavix (artery to artery embolism vs symptomatic intracranial atherosclerosis) HTN, HLD, Alcoholism c/b Korsakoff's Dementia, now found to have PE and being treated with full dose lovenox.  Neurology consulted to evaluate risk of therapeutic lovenox in patient s/p recent CVA. Of note, patient currently sustaining hematoma on the L supraorbital ridge as well as hematoma of the R thigh. Patients Hgb on arrival 6.6 CT Head noncontrast obtained here demonstrates: Age-indeterminate lacunar infarct involving the left thalamus (posterior), new since 9/27/2019. MRI Brain w/o from 9/19 to my eye demonstrates possible evidence of multi-infarctions in the L thalamus. Patient possesses an MRS of 4 as per prior admission and patient is typically awake and oriented to person with occasional orientation to place.        PAST MEDICAL & SURGICAL HISTORY:  Alcohol dependence  Hyperlipemia  HTN (hypertension)  Depression  Depression  Hyperlipemia  HTN (hypertension)    FAMILY HISTORY: not pertinent as reviewed with the patient and family    SOCIAL HISTORY: Came from Rehab center, Hx of Alcoholism    ALLERGIES: No Known Allergies      HOME MEDICATIONS: ASA, plavix, T lovenox, risperidone, trazadone, Insulin Kwikpen, Sitagliptin, melatonin (09 Nov 2019 15:47)      A 10-system ROS was performed and is negative except for those items noted above and/or in the HPI.    PAST MEDICAL & SURGICAL HISTORY:  Alcohol dependence  Hyperlipemia  HTN (hypertension)  Depression  Depression  Hyperlipemia  HTN (hypertension)    MEDICATIONS (HOME):  Home Medications:  Aspirin Enteric Coated 81 mg oral delayed release tablet: 2 tab(s) orally once a day (01 Oct 2019 14:13)  atorvastatin 80 mg oral tablet: 1 tab(s) orally once a day (at bedtime) (01 Oct 2019 13:58)  clopidogrel 75 mg oral tablet: 1 tab(s) orally once a day (01 Oct 2019 13:58)  Colace 100 mg oral capsule: 1 cap(s) orally once a day (27 Sep 2019 21:05)  divalproex sodium 250 mg oral tablet, extended release: 2 tab(s) orally once a day (09 Nov 2019 18:44)  enoxaparin: 0.7 milliliter(s) injectable every 12 hours (09 Nov 2019 18:44)  Flonase 50 mcg/inh nasal spray: 1 spray(s) nasal once a day (27 Sep 2019 21:05)  HumaLOG KwikPen 100 units/mL injectable solution:  (09 Nov 2019 18:44)  loratadine 10 mg oral tablet: 1 tab(s) orally once a day (27 Sep 2019 21:05)  LORazepam 0.5 mg oral tablet: 1 tab(s) orally 4 times a day (27 Sep 2019 21:05)  Melatonin 5 mg oral capsule: 1 cap(s) orally once a day (at bedtime) (09 Nov 2019 18:44)  Metamucil: 1 packet(s) orally once a day (27 Sep 2019 21:05)  metFORMIN 1000 mg oral tablet: 1 tab(s) orally 2 times a day (with meals) (27 Sep 2019 21:05)  risperiDONE 0.25 mg oral tablet: 1 tab(s) orally once a day (27 Sep 2019 21:05)  Senna 8.6 mg oral tablet: 1 tab(s) orally once a day (at bedtime) (27 Sep 2019 21:05)  SITagliptin 100 mg oral tablet: 1 tab(s) orally once a day (09 Nov 2019 18:44)  traZODone 100 mg oral tablet: 2 tab(s) orally once a day (at bedtime) (27 Sep 2019 21:05)  Vitamin D3 5000 intl units oral capsule: 1 cap(s) orally once a day (27 Sep 2019 21:05)    MEDICATIONS  (STANDING):  atorvastatin 80 milliGRAM(s) Oral at bedtime  diVALproex  milliGRAM(s) Oral daily  enoxaparin Injectable 70 milliGRAM(s) SubCutaneous every 12 hours  influenza   Vaccine 0.5 milliLiter(s) IntraMuscular once  insulin lispro (HumaLOG) corrective regimen sliding scale   SubCutaneous three times a day before meals  insulin lispro (HumaLOG) corrective regimen sliding scale   SubCutaneous at bedtime  risperiDONE  Disintegrating Tablet 0.25 milliGRAM(s) Oral daily  traZODone 200 milliGRAM(s) Oral at bedtime    MEDICATIONS  (PRN):  acetaminophen   Tablet .. 650 milliGRAM(s) Oral every 6 hours PRN Mild Pain (1 - 3)    ALLERGIES/INTOLERANCES:  Allergies  No Known Allergies    Intolerances    VITALS & EXAMINATION:  Vital Signs Last 24 Hrs  T(C): 36.3 (10 Nov 2019 23:00), Max: 37.1 (10 Nov 2019 06:00)  T(F): 97.3 (10 Nov 2019 23:00), Max: 98.8 (10 Nov 2019 06:00)  HR: 91 (11 Nov 2019 03:00) (77 - 100)  BP: 144/65 (11 Nov 2019 03:00) (103/55 - 187/79)  BP(mean): 94 (11 Nov 2019 03:00) (76 - 122)  RR: 23 (11 Nov 2019 03:00) (13 - 30)  SpO2: 99% (11 Nov 2019 03:00) (88% - 100%)    General:  Constitutional: Female, appears stated age, in no apparent distress including pain  Head: Normocephalic & atraumatic.    Neurological (>12):  MS: Awake, alert, oriented to person, place, situation, not time (states Month is February, year is January, President is [place]) Normal affect. Follows requests except for serial 7s, discerns time on clock correctly, no evidence of apraxia    Language: Speech is clear, fluent with good repetition & comprehension (able to name watch, pen)    CNs: PERRLA (R = 3mm, L = 3mm). VFF. EOMI no nystagmus, no diplopia. V1-3 intact to LT/pinprick, well developed masseter muscles b/l. No facial asymmetry b/l, full eye closure strength b/l. Hearing grossly normal (rubbing fingers) b/l. Symmetric palate elevation in midline. Gag reflex deferred. Head turning & shoulder shrug intact b/l. Tongue midline, normal movements, no atrophy.    Motor: Normal muscle bulk & tone. No noticeable tremor or seizure. Subtle pronator drift present in RUE, 4/5 in RLE, remains antigravity but limited by pain from hematoma and weakness in RLE. LUE and LLE 5/5	     Sensation: Intact to LT/PP/Temp/Vibration/Position b/l throughout.     Cortical: Extinction on DSS (neglect): none    Reflexes:              Biceps(C5)       BR(C6)     Triceps(C7)               Patellar(L4)    Achilles(S1)    Plantar Resp  R	1	          1	             1		        2		    2		Upgoing  L	2	          2             2		        2		    2		Down     Coordination: Dysmetria on FTN BL    Gait: deferred    LABORATORY:  CBC                       7.6    7.91  )-----------( 318      ( 11 Nov 2019 01:06 )             23.2     Chem 11-11    140  |  102  |  8   ----------------------------<  137<H>  3.8   |  26  |  0.40<L>    Ca    9.2      11 Nov 2019 01:06  Phos  3.5     11-11  Mg     1.8     11-11    TPro  6.5  /  Alb  3.5  /  TBili  1.9<H>  /  DBili  x   /  AST  40  /  ALT  16  /  AlkPhos  40  11-09    LFTs LIVER FUNCTIONS - ( 09 Nov 2019 15:35 )  Alb: 3.5 g/dL / Pro: 6.5 g/dL / ALK PHOS: 40 U/L / ALT: 16 U/L / AST: 40 U/L / GGT: x           Coagulopathy PT/INR - ( 11 Nov 2019 01:06 )   PT: 10.8 sec;   INR: 0.94 ratio         PTT - ( 11 Nov 2019 01:06 )  PTT:32.0 sec  Lipid Panel   A1c   Cardiac enzymes CARDIAC MARKERS ( 09 Nov 2019 15:35 )  x     / x     / 258 U/L / x     / x          U/A Urinalysis Basic - ( 09 Nov 2019 17:55 )    Color: Yellow / Appearance: Clear / SG: >1.050 / pH: x  Gluc: x / Ketone: Small  / Bili: Negative / Urobili: >8mg/dL   Blood: x / Protein: 30 mg/dL / Nitrite: Negative   Leuk Esterase: Negative / RBC: 5 /hpf / WBC 7 /HPF   Sq Epi: x / Non Sq Epi: 6 /hpf / Bacteria: Negative    Radiology (XR, CT, MR, U/S, TTE/ELBA):      < from: CT Head No Cont (11.09.19 @ 16:05) >  CT BRAIN:     No acute intracranial hemorrhage, mass effect or midline shift.     No displaced calvarial fracture. Large left frontal scalp hematoma.    Age-indeterminate lacunar infarct involving the left thalamus, new since   9/27/2019. Redemonstrated chronic infarcts in the right frontal lobe and   left corona radiata.    CT CERVICAL SPINE:   No acute fracture or subluxation. Moderate multilevel cervical   spondylosis.    < end of copied text >      CT head No Cont (9/27/19): No acute intracranial hemorrhage, mass effect, or shift of the midline structures.2. Small age indeterminate infarct within the left corona radiata extending to the left internal capsule.3. Additional chronic right frontal lobe infarct and chronic microvascular disease.  CTA BRAIN w/ IV Cont (9/27/19):Multifocal areas of moderate to severe stenosis involving the right posterior cerebral artery.2. Additional areas of stenoses involving the right anterior cerebral artery. Please see discussion the body of the report.3. Mild stenosis involving the origin of the left superior terminal branch of the left MCA at the MCA bifurcation.  CTA NECK w/ IV cont (9/27/19):No focal stenosis, major vessel occlusion, aneurysm or dissection of the bilateral vertebral, common carotid, and internal carotid arteries.2. Ascending aortic aneurysm measuring up to 4.7 cm. HPI:    Patient is a 68y/o lady who presents to the facility from rehab facility on the evening of 11/9/19 after suffering a fall w/ headstrike. Patient harbors a past medical history significant for L Ventral Thalamic Lacunar infarct s/p tpa in 9/19 on ASA and Plavix (artery to artery embolism vs symptomatic intracranial atherosclerosis) HTN, HLD, Alcoholism c/b Korsakoff's Dementia, now found to have PE and being treated with full dose lovenox.  Neurology consulted to evaluate risk of therapeutic lovenox in patient s/p recent CVA. Of note, patient currently sustaining hematoma on the L supraorbital ridge as well as hematoma of the R thigh. Patients Hgb on arrival 6.6 CT Head noncontrast obtained here demonstrates: Age-indeterminate lacunar infarct involving the left thalamus (posterior), new since 9/27/2019. MRI Brain w/o from 9/19 to my eye demonstrates possible evidence of multi-infarctions in the L thalamus. Patient possesses an MRS of 4 as per prior admission and patient is typically awake and oriented to person with occasional orientation to place.        PAST MEDICAL & SURGICAL HISTORY:  Alcohol dependence  Hyperlipemia  HTN (hypertension)  Depression  Depression  Hyperlipemia  HTN (hypertension)    FAMILY HISTORY: not pertinent as reviewed with the patient and family    SOCIAL HISTORY: Came from Rehab center, Hx of Alcoholism    ALLERGIES: No Known Allergies      HOME MEDICATIONS: ASA, plavix, T lovenox, risperidone, trazadone, Insulin Kwikpen, Sitagliptin, melatonin (09 Nov 2019 15:47)      A 10-system ROS was performed and is negative except for those items noted above and/or in the HPI.    PAST MEDICAL & SURGICAL HISTORY:  Alcohol dependence  Hyperlipemia  HTN (hypertension)  Depression  Depression  Hyperlipemia  HTN (hypertension)    MEDICATIONS (HOME):  Home Medications:  Aspirin Enteric Coated 81 mg oral delayed release tablet: 2 tab(s) orally once a day (01 Oct 2019 14:13)  atorvastatin 80 mg oral tablet: 1 tab(s) orally once a day (at bedtime) (01 Oct 2019 13:58)  clopidogrel 75 mg oral tablet: 1 tab(s) orally once a day (01 Oct 2019 13:58)  Colace 100 mg oral capsule: 1 cap(s) orally once a day (27 Sep 2019 21:05)  divalproex sodium 250 mg oral tablet, extended release: 2 tab(s) orally once a day (09 Nov 2019 18:44)  enoxaparin: 0.7 milliliter(s) injectable every 12 hours (09 Nov 2019 18:44)  Flonase 50 mcg/inh nasal spray: 1 spray(s) nasal once a day (27 Sep 2019 21:05)  HumaLOG KwikPen 100 units/mL injectable solution:  (09 Nov 2019 18:44)  loratadine 10 mg oral tablet: 1 tab(s) orally once a day (27 Sep 2019 21:05)  LORazepam 0.5 mg oral tablet: 1 tab(s) orally 4 times a day (27 Sep 2019 21:05)  Melatonin 5 mg oral capsule: 1 cap(s) orally once a day (at bedtime) (09 Nov 2019 18:44)  Metamucil: 1 packet(s) orally once a day (27 Sep 2019 21:05)  metFORMIN 1000 mg oral tablet: 1 tab(s) orally 2 times a day (with meals) (27 Sep 2019 21:05)  risperiDONE 0.25 mg oral tablet: 1 tab(s) orally once a day (27 Sep 2019 21:05)  Senna 8.6 mg oral tablet: 1 tab(s) orally once a day (at bedtime) (27 Sep 2019 21:05)  SITagliptin 100 mg oral tablet: 1 tab(s) orally once a day (09 Nov 2019 18:44)  traZODone 100 mg oral tablet: 2 tab(s) orally once a day (at bedtime) (27 Sep 2019 21:05)  Vitamin D3 5000 intl units oral capsule: 1 cap(s) orally once a day (27 Sep 2019 21:05)    MEDICATIONS  (STANDING):  atorvastatin 80 milliGRAM(s) Oral at bedtime  diVALproex  milliGRAM(s) Oral daily  enoxaparin Injectable 70 milliGRAM(s) SubCutaneous every 12 hours  influenza   Vaccine 0.5 milliLiter(s) IntraMuscular once  insulin lispro (HumaLOG) corrective regimen sliding scale   SubCutaneous three times a day before meals  insulin lispro (HumaLOG) corrective regimen sliding scale   SubCutaneous at bedtime  risperiDONE  Disintegrating Tablet 0.25 milliGRAM(s) Oral daily  traZODone 200 milliGRAM(s) Oral at bedtime    MEDICATIONS  (PRN):  acetaminophen   Tablet .. 650 milliGRAM(s) Oral every 6 hours PRN Mild Pain (1 - 3)    ALLERGIES/INTOLERANCES:  Allergies  No Known Allergies    Intolerances    VITALS & EXAMINATION:  Vital Signs Last 24 Hrs  T(C): 36.3 (10 Nov 2019 23:00), Max: 37.1 (10 Nov 2019 06:00)  T(F): 97.3 (10 Nov 2019 23:00), Max: 98.8 (10 Nov 2019 06:00)  HR: 91 (11 Nov 2019 03:00) (77 - 100)  BP: 144/65 (11 Nov 2019 03:00) (103/55 - 187/79)  BP(mean): 94 (11 Nov 2019 03:00) (76 - 122)  RR: 23 (11 Nov 2019 03:00) (13 - 30)  SpO2: 99% (11 Nov 2019 03:00) (88% - 100%)    General:  Constitutional: Female, appears stated age, in no apparent distress including pain  Head: Normocephalic & atraumatic.    Neurological (>12):  MS: Awake, alert, oriented to person, place, situation, not time (states Month is February, year is January, President is Dylan) Normal affect. Follows requests except for serial 7s, discerns time on clock correctly. Left-right confusion present (touches L thumb to L ear w/ confidence after posed request to touch L thumb to R ear)    Language: Speech is clear, fluent with good repetition & comprehension (able to name watch, pen)    CNs: PERRLA (R = 3mm, L = 3mm). Diminshed Blink to threat in L temporal fields. EOMI no nystagmus, no diplopia. V1-3 intact to LT/pinprick, well developed masseter muscles b/l. No facial asymmetry b/l, full eye closure strength b/l. Hearing grossly normal (rubbing fingers) b/l. Symmetric palate elevation in midline. Gag reflex deferred. Head turning & shoulder shrug intact b/l. Tongue midline, normal movements, no atrophy.    Motor: Normal muscle bulk & tone. No noticeable tremor or seizure. Subtle pronator drift present in RUE, 4/5 in RLE, remains antigravity but limited by pain from hematoma and weakness in RLE. LUE and LLE 5/5	     Sensation: Intact to LT/PP/Temp/Vibration/Position b/l throughout.     Cortical: Extinction on DSS (neglect): none    Reflexes:              Biceps(C5)       BR(C6)     Triceps(C7)               Patellar(L4)    Achilles(S1)    Plantar Resp  R	1	          1	             1		        2		    2		Upgoing  L	2	          2             2		        2		    2		Down     Coordination: Dysmetria on FTN BL    Gait: deferred    LABORATORY:  CBC                       7.6    7.91  )-----------( 318      ( 11 Nov 2019 01:06 )             23.2     Chem 11-11    140  |  102  |  8   ----------------------------<  137<H>  3.8   |  26  |  0.40<L>    Ca    9.2      11 Nov 2019 01:06  Phos  3.5     11-11  Mg     1.8     11-11    TPro  6.5  /  Alb  3.5  /  TBili  1.9<H>  /  DBili  x   /  AST  40  /  ALT  16  /  AlkPhos  40  11-09    LFTs LIVER FUNCTIONS - ( 09 Nov 2019 15:35 )  Alb: 3.5 g/dL / Pro: 6.5 g/dL / ALK PHOS: 40 U/L / ALT: 16 U/L / AST: 40 U/L / GGT: x           Coagulopathy PT/INR - ( 11 Nov 2019 01:06 )   PT: 10.8 sec;   INR: 0.94 ratio         PTT - ( 11 Nov 2019 01:06 )  PTT:32.0 sec  Lipid Panel   A1c   Cardiac enzymes CARDIAC MARKERS ( 09 Nov 2019 15:35 )  x     / x     / 258 U/L / x     / x          U/A Urinalysis Basic - ( 09 Nov 2019 17:55 )    Color: Yellow / Appearance: Clear / SG: >1.050 / pH: x  Gluc: x / Ketone: Small  / Bili: Negative / Urobili: >8mg/dL   Blood: x / Protein: 30 mg/dL / Nitrite: Negative   Leuk Esterase: Negative / RBC: 5 /hpf / WBC 7 /HPF   Sq Epi: x / Non Sq Epi: 6 /hpf / Bacteria: Negative    Radiology (XR, CT, MR, U/S, TTE/ELBA):      < from: CT Head No Cont (11.09.19 @ 16:05) >  CT BRAIN:     No acute intracranial hemorrhage, mass effect or midline shift.     No displaced calvarial fracture. Large left frontal scalp hematoma.    Age-indeterminate lacunar infarct involving the left thalamus, new since   9/27/2019. Redemonstrated chronic infarcts in the right frontal lobe and   left corona radiata.    CT CERVICAL SPINE:   No acute fracture or subluxation. Moderate multilevel cervical   spondylosis.    < end of copied text >      CT head No Cont (9/27/19): No acute intracranial hemorrhage, mass effect, or shift of the midline structures.2. Small age indeterminate infarct within the left corona radiata extending to the left internal capsule.3. Additional chronic right frontal lobe infarct and chronic microvascular disease.  CTA BRAIN w/ IV Cont (9/27/19):Multifocal areas of moderate to severe stenosis involving the right posterior cerebral artery.2. Additional areas of stenoses involving the right anterior cerebral artery. Please see discussion the body of the report.3. Mild stenosis involving the origin of the left superior terminal branch of the left MCA at the MCA bifurcation.  CTA NECK w/ IV cont (9/27/19):No focal stenosis, major vessel occlusion, aneurysm or dissection of the bilateral vertebral, common carotid, and internal carotid arteries.2. Ascending aortic aneurysm measuring up to 4.7 cm.

## 2019-11-11 NOTE — PROGRESS NOTE ADULT - ASSESSMENT
69F PMHx CVA on ASA, plavix, and Tlovenox BIBEMS from rehab facility after a suspected fall, initially brought to the main ED area but Level 2 called when she was hypoxic. Primary survey intact, GCS difficult to assess given baseline mental status of AXO x1. Secondary survey significant for left supraorbital edema and ecchymosis with superficial abrasion, RUQ abdominal tenderness, lower abdominal skin ecchymosis, RLE ecchymosis and swelling, RLE thigh firm, nontender, full ROM and full sensation.     Of note, patient had a recent fall in which she had a hematoma of the R thigh per her brother. CTA negative for extravasation. H/H stabilized, lactic acidosis resolved. Repeat CT showed heterogeneous mass/collection in the right proximal vastus   muscles, unchanged from the prior CT    Plan:  - Regular diet  - Discharge planning back to [facility] ([place])    ACS & Trauma, [id number]

## 2019-11-11 NOTE — PROGRESS NOTE ADULT - ASSESSMENT
69F PMHx CVA on ASA, plavix, and Tlovenox BIBEMS from rehab facility after a suspected fall as level II trauma, imaging revealing right LE intramuscular hematoma     Neuro: AAOx1 at baseline   - monitor mental status   - Tylenol PRN for pain control  - c/w home Trazadone, divalproex, and risperidone     Resp:   - no active issues   - monitor O2 saturation     Cardiac:   - holding ASA and Plavix  - monitor vital signs     GI:   - regular diet     Renal:   - monitor Is and Os  - IVF   - monitor electrolytes and replete     Heme:   - Continue T Lovenox for PE   - Holding ASA/Plavix    ID:   - no active issues     Endo:   - FSG  - ISS     Dispo: SICU

## 2019-11-11 NOTE — PROGRESS NOTE ADULT - SUBJECTIVE AND OBJECTIVE BOX
HISTORY  69y Female PMHx CVA on ASA, plavix, and Tlovenox BIBEMS from rehab facility after a suspected fall, initially brought to the main ED area but Level 2 called when she was hypoxic. Primary survey intact, GCS difficult to assess given baseline mental status of AXO x1. Secondary survey significant for left supraorbital edema and ecchymosis with superficial abrasion, RUQ abdominal tenderness, lower abdominal skin ecchymosis, RLE ecchymosis and swelling, RLE thigh firm, nontender, full ROM and full sensation. Primary survey intact. Secondary survey revealing ecchymosis of right thigh, firm, denies any TTP. DP/PT pulses palpable. Patient found to have H/H 6.6/21 on arrival was given 1u pRBC in ED with appropriate response to 8.1/25. CTA of RLE demonstrating intramuscular hematoma, patient transferred to SICU for hemorrhage watch.       24 HOUR EVENTS:   - Non-con CT scan of thigh obtained   - Therapeutic AC restarted; ASA/plavix still on hold     SUBJECTIVE/ROS:  [ ] A ten-point review of systems was otherwise negative except as noted.  [ ] Due to altered mental status/intubation, subjective information were not able to be obtained from the patient. History was obtained, to the extent possible, from review of the chart and collateral sources of information.      NEURO  Exam: awake, alert, oriented x1   Meds: acetaminophen   Tablet .. 650 milliGRAM(s) Oral every 6 hours PRN Mild Pain (1 - 3)  diVALproex  milliGRAM(s) Oral daily  risperiDONE  Disintegrating Tablet 0.25 milliGRAM(s) Oral daily  traZODone 200 milliGRAM(s) Oral at bedtime    [x] Adequacy of sedation and pain control has been assessed and adjusted      RESPIRATORY  RR: 14 (11-11-19 @ 04:00) (13 - 30)  SpO2: 98% (11-11-19 @ 04:00) (88% - 100%)  Exam: unlabored, clear to auscultation bilaterally  Mechanical Ventilation: none  [N/A] Extubation Readiness Assessed  Meds: none      CARDIOVASCULAR  HR: 83 (11-11-19 @ 04:00) (77 - 100)  BP: 131/63 (11-11-19 @ 04:00) (119/63 - 187/79)  BP(mean): 91 (11-11-19 @ 04:00) (86 - 122)  VBG - ( 10 Nov 2019 03:16 )  pH: 7.39  /  pCO2: 52    /  pO2: 47    / HCO3: 31    / Base Excess: 5.8   /  SaO2: 80     Lactate: 1.2    Exam: regular rate and rhythm  Cardiac Rhythm: sinus  Perfusion     [x]Adequate   [ ]Inadequate  Mentation   [x]Normal       [ ]Reduced  Extremities  [x]Warm         [ ]Cool  Volume Status [ ]Hypervolemic [x]Euvolemic [ ]Hypovolemic  Meds: none      GI/NUTRITION  Exam: soft, nontender, nondistended, incision C/D/I  Diet: regular diet   Meds: none    GENITOURINARY  I&O's Detail    11-09 @ 07:01  -  11-10 @ 07:00  --------------------------------------------------------  IN:    IV PiggyBack: 50 mL    lactated ringers.: 900 mL  Total IN: 950 mL    OUT:    Voided: 900 mL  Total OUT: 900 mL    Total NET: 50 mL      11-10 @ 07:01 - 11-11 @ 05:31  --------------------------------------------------------  IN:    lactated ringers.: 900 mL    Oral Fluid: 1200 mL  Total IN: 2100 mL    OUT:    Voided: 1725 mL  Total OUT: 1725 mL    Total NET: 375 mL          11-11    140  |  102  |  8   ----------------------------<  137<H>  3.8   |  26  |  0.40<L>    Ca    9.2      11 Nov 2019 01:06  Phos  3.5     11-11  Mg     1.8     11-11    TPro  6.5  /  Alb  3.5  /  TBili  1.9<H>  /  DBili  x   /  AST  40  /  ALT  16  /  AlkPhos  40  11-09    [ ] Fowler catheter, indication: N/A  Meds: none      HEMATOLOGIC  Meds: enoxaparin Injectable 70 milliGRAM(s) SubCutaneous every 12 hours    [x] VTE Prophylaxis                        7.6    7.91  )-----------( 318      ( 11 Nov 2019 01:06 )             23.2     PT/INR - ( 11 Nov 2019 01:06 )   PT: 10.8 sec;   INR: 0.94 ratio         PTT - ( 11 Nov 2019 01:06 )  PTT:32.0 sec  Transfusion     [ ] PRBC   [ ] Platelets   [ ] FFP   [ ] Cryoprecipitate      INFECTIOUS DISEASES  WBC Count: 7.91 K/uL (11-11 @ 01:06)  WBC Count: 8.17 K/uL (11-10 @ 15:20)  WBC Count: 7.00 K/uL (11-10 @ 09:15)    RECENT CULTURES:  Specimen Source: .Blood Blood-Peripheral  Date/Time: 11-09 @ 23:16  Culture Results:   No growth to date.  Gram Stain: --  Organism: --    Meds: influenza   Vaccine 0.5 milliLiter(s) IntraMuscular once        ENDOCRINE  CAPILLARY BLOOD GLUCOSE      POCT Blood Glucose.: 130 mg/dL (10 Nov 2019 21:08)  POCT Blood Glucose.: 113 mg/dL (10 Nov 2019 16:57)  POCT Blood Glucose.: 152 mg/dL (10 Nov 2019 12:17)  POCT Blood Glucose.: 115 mg/dL (10 Nov 2019 08:48)    Meds: atorvastatin 80 milliGRAM(s) Oral at bedtime  insulin lispro (HumaLOG) corrective regimen sliding scale   SubCutaneous three times a day before meals  insulin lispro (HumaLOG) corrective regimen sliding scale   SubCutaneous at bedtime        ACCESS DEVICES:  [x] Peripheral IV  [ ] Central Venous Line	[ ] R	[ ] L	[ ] IJ	[ ] Fem	[ ] SC	Placed:   [ ] Arterial Line		[ ] R	[ ] L	[ ] Fem	[ ] Rad	[ ] Ax	Placed:   [ ] PICC:					[ ] Mediport  [ ] Urinary Catheter, Date Placed:   [x] Necessity of urinary, arterial, and venous catheters discussed    OTHER MEDICATIONS: none      CODE STATUS: full code      IMAGING: < from: CT Angio Lower Extremity w/ IV Cont, Right (11.09.19 @ 18:35) >  IMPRESSION:   A heterogeneous structure within the right quadriceps muscles (vastus   medialis and vastus lateralis) may represent a hematoma, in the given   clinical setting. No evidence of active arterial extravasation.  Precontrast images are also needed through this region to measure   internal enhancement to exclude a mass.  The arteries of the right leg are patent.    < end of copied text >

## 2019-11-11 NOTE — CONSULT NOTE ADULT - SUBJECTIVE AND OBJECTIVE BOX
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 Patient found to have H/H 6.6/21 on arrival was given 1u pRBC in ED with appropriate response to 8.1/25. CTA of RLE demonstrating intramuscular hematoma, patient transferred to SICU for hemorrhage watch. Patient cant participate in history taking because of her mental status AO x1, per chart review she was hospitalized again 9/27/19 with symptomatic intracranial atherosclerosis in the form of hypoperfusion vs artery to artery embolism. Per her brother she has hx of recent PE. Asked him to bring her hospital records tomorrow. Patient restarted on Lovenox. Hematology consulted about anti-coagulation.        PAST MEDICAL & SURGICAL HISTORY:  Alcohol dependence  Hyperlipemia  HTN (hypertension)  Depression  Depression  Hyperlipemia  HTN (hypertension)    FAMILY HISTORY: not pertinent as reviewed with the patient and family    SOCIAL HISTORY: Came from Rehab center, Hx of Alcoholism    ALLERGIES: No Known Allergies      HOME MEDICATIONS: ASA, plavix, T lovenox, risperidone, trazadone, Insulin Kwikpen, Sitagliptin, melatonin (09 Nov 2019 15:47)        REVIEW OF SYSTEMS:  All other review of systems is negative unless indicated above.  Allergies    No Known Allergies    Intolerances        PAST MEDICAL & SURGICAL HISTORY:  Diabetes type 2, controlled  Hyperlipidemia  Hypertension  Aphasia  Dementia  Bipolar 1 disorder  Dementia  Insomnia  Cerebral infarction  Atherosclerotic cardiovascular disease  Alcohol dependence  Alcohol dependence  Hyperlipemia  HTN (hypertension)  Depression  No significant past surgical history  Depression  Hyperlipemia  HTN (hypertension)    VITAL SIGNS:  Vital Signs Last 24 Hrs  T(C): 36.5 (11 Nov 2019 15:00), Max: 37.1 (10 Nov 2019 19:24)  T(F): 97.7 (11 Nov 2019 15:00), Max: 98.8 (10 Nov 2019 19:24)  HR: 95 (11 Nov 2019 15:00) (77 - 104)  BP: 175/78 (11 Nov 2019 15:00) (126/62 - 191/84)  BP(mean): 112 (11 Nov 2019 15:00) (88 - 120)  RR: 24 (11 Nov 2019 15:00) (14 - 31)  SpO2: 99% (11 Nov 2019 15:00) (92% - 100%)      PHYSICAL EXAM:     GENERAL: wake, alert, oriented x1   HEENT: EOMI, neck supple, hematoma in right temporal and right supraorbital  RESPIRATORY: LCTAB/L, no rhonchi, rales, or wheezing  CARDIOVASCULAR: RRR, no murmurs, gallops, rubs  ABDOMINAL: soft, non-tender, non-distended, positive bowel sounds   EXTREMITIES: no clubbing, cyanosis, or edema, right thigh hematoma  NEUROLOGICAL: alert and oriented x 3, non-focal  SKIN: no rashes or lesions   MUSCULOSKELETAL: no gross joint deformity                          7.6    7.91  )-----------( 318      ( 11 Nov 2019 01:06 )             23.2     11-11    140  |  102  |  8   ----------------------------<  137<H>  3.8   |  26  |  0.40<L>    Ca    9.2      11 Nov 2019 01:06  Phos  3.5     11-11  Mg     1.8     11-11        MEDICATIONS  (STANDING):  aspirin  chewable 81 milliGRAM(s) Oral daily  atorvastatin 80 milliGRAM(s) Oral at bedtime  diVALproex  milliGRAM(s) Oral daily  enoxaparin Injectable 70 milliGRAM(s) SubCutaneous every 12 hours  influenza   Vaccine 0.5 milliLiter(s) IntraMuscular once  metoprolol tartrate 50 milliGRAM(s) Oral two times a day  traZODone 200 milliGRAM(s) Oral at bedtime

## 2019-11-12 ENCOUNTER — INBOUND DOCUMENT (OUTPATIENT)
Age: 69
End: 2019-11-12

## 2019-11-12 DIAGNOSIS — M79.651 PAIN IN RIGHT THIGH: ICD-10-CM

## 2019-11-12 LAB
ANION GAP SERPL CALC-SCNC: 11 MMOL/L — SIGNIFICANT CHANGE UP (ref 5–17)
APTT BLD: 29.4 SEC — SIGNIFICANT CHANGE UP (ref 27.5–36.3)
BUN SERPL-MCNC: 9 MG/DL — SIGNIFICANT CHANGE UP (ref 7–23)
CALCIUM SERPL-MCNC: 9.3 MG/DL — SIGNIFICANT CHANGE UP (ref 8.4–10.5)
CHLORIDE SERPL-SCNC: 100 MMOL/L — SIGNIFICANT CHANGE UP (ref 96–108)
CO2 SERPL-SCNC: 23 MMOL/L — SIGNIFICANT CHANGE UP (ref 22–31)
CREAT SERPL-MCNC: 0.42 MG/DL — LOW (ref 0.5–1.3)
GLUCOSE SERPL-MCNC: 134 MG/DL — HIGH (ref 70–99)
HCT VFR BLD CALC: 25.7 % — LOW (ref 34.5–45)
HGB BLD-MCNC: 8.1 G/DL — LOW (ref 11.5–15.5)
INR BLD: 0.97 RATIO — SIGNIFICANT CHANGE UP (ref 0.88–1.16)
MAGNESIUM SERPL-MCNC: 2 MG/DL — SIGNIFICANT CHANGE UP (ref 1.6–2.6)
MCHC RBC-ENTMCNC: 31.5 GM/DL — LOW (ref 32–36)
MCHC RBC-ENTMCNC: 34 PG — SIGNIFICANT CHANGE UP (ref 27–34)
MCV RBC AUTO: 108 FL — HIGH (ref 80–100)
NRBC # BLD: 1 /100 WBCS — HIGH (ref 0–0)
PHOSPHATE SERPL-MCNC: 3 MG/DL — SIGNIFICANT CHANGE UP (ref 2.5–4.5)
PLATELET # BLD AUTO: 355 K/UL — SIGNIFICANT CHANGE UP (ref 150–400)
POTASSIUM SERPL-MCNC: 3.7 MMOL/L — SIGNIFICANT CHANGE UP (ref 3.5–5.3)
POTASSIUM SERPL-SCNC: 3.7 MMOL/L — SIGNIFICANT CHANGE UP (ref 3.5–5.3)
PROTHROM AB SERPL-ACNC: 11.1 SEC — SIGNIFICANT CHANGE UP (ref 10–12.9)
RBC # BLD: 2.38 M/UL — LOW (ref 3.8–5.2)
RBC # FLD: 21.9 % — HIGH (ref 10.3–14.5)
SODIUM SERPL-SCNC: 134 MMOL/L — LOW (ref 135–145)
WBC # BLD: 8.99 K/UL — SIGNIFICANT CHANGE UP (ref 3.8–10.5)
WBC # FLD AUTO: 8.99 K/UL — SIGNIFICANT CHANGE UP (ref 3.8–10.5)

## 2019-11-12 PROCEDURE — 80061 LIPID PANEL: CPT

## 2019-11-12 PROCEDURE — 80053 COMPREHEN METABOLIC PANEL: CPT

## 2019-11-12 PROCEDURE — 70498 CT ANGIOGRAPHY NECK: CPT

## 2019-11-12 PROCEDURE — 99291 CRITICAL CARE FIRST HOUR: CPT | Mod: 25

## 2019-11-12 PROCEDURE — 99231 SBSQ HOSP IP/OBS SF/LOW 25: CPT

## 2019-11-12 PROCEDURE — 85576 BLOOD PLATELET AGGREGATION: CPT

## 2019-11-12 PROCEDURE — 97161 PT EVAL LOW COMPLEX 20 MIN: CPT

## 2019-11-12 PROCEDURE — 83036 HEMOGLOBIN GLYCOSYLATED A1C: CPT

## 2019-11-12 PROCEDURE — 82962 GLUCOSE BLOOD TEST: CPT

## 2019-11-12 PROCEDURE — 97116 GAIT TRAINING THERAPY: CPT

## 2019-11-12 PROCEDURE — 70496 CT ANGIOGRAPHY HEAD: CPT

## 2019-11-12 PROCEDURE — 97166 OT EVAL MOD COMPLEX 45 MIN: CPT

## 2019-11-12 PROCEDURE — G0515: CPT

## 2019-11-12 PROCEDURE — 93005 ELECTROCARDIOGRAM TRACING: CPT

## 2019-11-12 PROCEDURE — 97530 THERAPEUTIC ACTIVITIES: CPT

## 2019-11-12 PROCEDURE — 99232 SBSQ HOSP IP/OBS MODERATE 35: CPT

## 2019-11-12 PROCEDURE — 85027 COMPLETE CBC AUTOMATED: CPT

## 2019-11-12 PROCEDURE — 80048 BASIC METABOLIC PNL TOTAL CA: CPT

## 2019-11-12 PROCEDURE — 85610 PROTHROMBIN TIME: CPT

## 2019-11-12 PROCEDURE — 85730 THROMBOPLASTIN TIME PARTIAL: CPT

## 2019-11-12 RX ORDER — AMLODIPINE BESYLATE 2.5 MG/1
10 TABLET ORAL DAILY
Refills: 0 | Status: DISCONTINUED | OUTPATIENT
Start: 2019-11-13 | End: 2019-11-14

## 2019-11-12 RX ORDER — POTASSIUM CHLORIDE 20 MEQ
20 PACKET (EA) ORAL ONCE
Refills: 0 | Status: COMPLETED | OUTPATIENT
Start: 2019-11-12 | End: 2019-11-12

## 2019-11-12 RX ORDER — RISPERIDONE 4 MG/1
0.25 TABLET ORAL DAILY
Refills: 0 | Status: DISCONTINUED | OUTPATIENT
Start: 2019-11-12 | End: 2019-11-14

## 2019-11-12 RX ORDER — HALOPERIDOL DECANOATE 100 MG/ML
2.5 INJECTION INTRAMUSCULAR ONCE
Refills: 0 | Status: COMPLETED | OUTPATIENT
Start: 2019-11-12 | End: 2019-11-12

## 2019-11-12 RX ORDER — LABETALOL HCL 100 MG
10 TABLET ORAL ONCE
Refills: 0 | Status: COMPLETED | OUTPATIENT
Start: 2019-11-12 | End: 2019-11-12

## 2019-11-12 RX ORDER — AMLODIPINE BESYLATE 2.5 MG/1
5 TABLET ORAL ONCE
Refills: 0 | Status: COMPLETED | OUTPATIENT
Start: 2019-11-12 | End: 2019-11-12

## 2019-11-12 RX ORDER — POTASSIUM CHLORIDE 20 MEQ
10 PACKET (EA) ORAL
Refills: 0 | Status: DISCONTINUED | OUTPATIENT
Start: 2019-11-12 | End: 2019-11-12

## 2019-11-12 RX ORDER — AMLODIPINE BESYLATE 2.5 MG/1
5 TABLET ORAL DAILY
Refills: 0 | Status: DISCONTINUED | OUTPATIENT
Start: 2019-11-12 | End: 2019-11-12

## 2019-11-12 RX ORDER — HALOPERIDOL DECANOATE 100 MG/ML
1 INJECTION INTRAMUSCULAR ONCE
Refills: 0 | Status: COMPLETED | OUTPATIENT
Start: 2019-11-12 | End: 2019-11-12

## 2019-11-12 RX ADMIN — ENOXAPARIN SODIUM 70 MILLIGRAM(S): 100 INJECTION SUBCUTANEOUS at 08:20

## 2019-11-12 RX ADMIN — ATORVASTATIN CALCIUM 80 MILLIGRAM(S): 80 TABLET, FILM COATED ORAL at 21:01

## 2019-11-12 RX ADMIN — Medication 50 MILLIGRAM(S): at 21:01

## 2019-11-12 RX ADMIN — Medication 10 MILLIGRAM(S): at 09:15

## 2019-11-12 RX ADMIN — Medication 20 MILLIEQUIVALENT(S): at 06:00

## 2019-11-12 RX ADMIN — AMLODIPINE BESYLATE 5 MILLIGRAM(S): 2.5 TABLET ORAL at 11:05

## 2019-11-12 RX ADMIN — Medication 100 MILLIEQUIVALENT(S): at 01:41

## 2019-11-12 RX ADMIN — Medication 10 MILLIGRAM(S): at 14:00

## 2019-11-12 RX ADMIN — Medication 10 MILLIGRAM(S): at 16:18

## 2019-11-12 RX ADMIN — Medication 200 MILLIGRAM(S): at 21:02

## 2019-11-12 RX ADMIN — RISPERIDONE 0.25 MILLIGRAM(S): 4 TABLET ORAL at 11:05

## 2019-11-12 RX ADMIN — DIVALPROEX SODIUM 500 MILLIGRAM(S): 500 TABLET, DELAYED RELEASE ORAL at 11:05

## 2019-11-12 RX ADMIN — Medication 81 MILLIGRAM(S): at 11:05

## 2019-11-12 RX ADMIN — ENOXAPARIN SODIUM 70 MILLIGRAM(S): 100 INJECTION SUBCUTANEOUS at 21:02

## 2019-11-12 RX ADMIN — Medication 50 MILLIGRAM(S): at 07:34

## 2019-11-12 RX ADMIN — Medication 650 MILLIGRAM(S): at 22:00

## 2019-11-12 RX ADMIN — HALOPERIDOL DECANOATE 2.5 MILLIGRAM(S): 100 INJECTION INTRAMUSCULAR at 02:22

## 2019-11-12 RX ADMIN — AMLODIPINE BESYLATE 5 MILLIGRAM(S): 2.5 TABLET ORAL at 14:19

## 2019-11-12 RX ADMIN — HALOPERIDOL DECANOATE 1 MILLIGRAM(S): 100 INJECTION INTRAMUSCULAR at 00:56

## 2019-11-12 RX ADMIN — Medication 650 MILLIGRAM(S): at 21:04

## 2019-11-12 NOTE — DIETITIAN INITIAL EVALUATION ADULT. - PERTINENT LABORATORY DATA
(11/12): Hgb 8.1, Hct 25.7, Na 134, Cr 0.42, Glu 134; POCT blood glucose 131, 130, 113, 152; (9/29): A1c 6.3%

## 2019-11-12 NOTE — PROGRESS NOTE ADULT - SUBJECTIVE AND OBJECTIVE BOX
ACS SURGERY DAILY PROGRESS NOTE:       SUBJECTIVE/ROS: Patient seen and examined at bedside.  Patients CBC remains stable.  No complaints this morning on AM rounds. 1 mg haloperidol given overnight for agitation    - Restarted on aspirin 81 mg daily  - Started on metoprolol 50 q12  - Risperidone d/c'd as her mechanical fall may be related to over-sedation with her home psych meds           MEDICATIONS  (STANDING):  aspirin  chewable 81 milliGRAM(s) Oral daily  atorvastatin 80 milliGRAM(s) Oral at bedtime  diVALproex  milliGRAM(s) Oral daily  enoxaparin Injectable 70 milliGRAM(s) SubCutaneous every 12 hours  influenza   Vaccine 0.5 milliLiter(s) IntraMuscular once  metoprolol tartrate 50 milliGRAM(s) Oral two times a day  traZODone 200 milliGRAM(s) Oral at bedtime    MEDICATIONS  (PRN):  acetaminophen   Tablet .. 650 milliGRAM(s) Oral every 6 hours PRN Mild Pain (1 - 3)      OBJECTIVE:    Vital Signs Last 24 Hrs  T(C): 36.6 (2019 07:00), Max: 37 (2019 23:00)  T(F): 97.9 (2019 07:00), Max: 98.6 (2019 23:00)  HR: 104 (2019 07:00) (75 - 104)  BP: 185/86 (2019 07:00) (108/57 - 191/84)  BP(mean): 123 (2019 07:00) (77 - 128)  RR: 22 (2019 07:00) (15 - 31)  SpO2: 95% (2019 07:00) (94% - 100%)        I&O's Detail    2019 07:01  -  2019 07:00  --------------------------------------------------------  IN:    IV PiggyBack: 100 mL    Oral Fluid: 1000 mL  Total IN: 1100 mL    OUT:    Voided: 1850 mL  Total OUT: 1850 mL    Total NET: -750 mL          Daily     Daily Weight in k.2 (2019 01:03)    LABS:                        8.1    8.99  )-----------( 355      ( 2019 00:17 )             25.7     11-12    134<L>  |  100  |  9   ----------------------------<  134<H>  3.7   |  23  |  0.42<L>    Ca    9.3      2019 00:17  Phos  3.0     -  Mg     2.0     11-12      PT/INR - ( 2019 00:17 )   PT: 11.1 sec;   INR: 0.97 ratio         PTT - ( 2019 00:17 )  PTT:29.4 sec              PHYSICAL EXAM:  General Appearance: Resting comfortably, no acute distress, head laceration without active bleeding   Chest: non-labored breathing, no respiratory distress  CV: Pulse regular presently  Abdomen: Soft, mildly tender to palpation, non-distended  Extremities: warm and well perfused, tense, but improved, can move all extremities.  R thigh mildly indurated, compartments soft.

## 2019-11-12 NOTE — PROGRESS NOTE ADULT - ASSESSMENT
69F PMHx CVA on ASA, plavix, and Tlovenox BIBEMS from rehab facility after a suspected fall as level II trauma, imaging revealing right LE intramuscular hematoma     Neuro: AAOx1 at baseline   - monitor mental status   - Tylenol PRN for pain control  - c/w home Trazadone, divalproex  - Home risperidone d/c'd as fall could be related to over-sedation    Resp:   - no active issues   - monitor O2 saturation     Cardiac:   - Aspirin resumed, keep holding Plavix as pt is already on full anticoagulation  - monitor vital signs     GI:   - regular diet     Renal:   - monitor I/Os, electrolytes, Cr    Heme:   - Continue T Lovenox for hx of PE   - Hold Plavix  - Trend H/H    ID:   - no active issues     Endo: history of diabetes  - FSG  - ISS     Dispo: listed for floors

## 2019-11-12 NOTE — PROGRESS NOTE ADULT - SUBJECTIVE AND OBJECTIVE BOX
SICU DAILY PROGRESS NOTE    69y Female PMHx CVA on ASA, plavix, and Tlovenox BIBEMS from rehab facility after a suspected fall, initially brought to the main ED area but Level 2 called when she was hypoxic. Primary survey intact, GCS difficult to assess given baseline mental status of AXO x1. Secondary survey significant for left supraorbital edema and ecchymosis with superficial abrasion, RUQ abdominal tenderness, lower abdominal skin ecchymosis, RLE ecchymosis and swelling, RLE thigh firm, nontender, full ROM and full sensation. Primary survey intact. Secondary survey revealing ecchymosis of right thigh, firm, denies any TTP. DP/PT pulses palpable. Patient found to have H/H 6.6/21 on arrival was given 1u pRBC in ED with appropriate response to 8.1/25. CTA of RLE demonstrating intramuscular hematoma, patient transferred to SICU for hemorrhage watch.     24 HOUR EVENTS:  - Restarted on aspirin 81 mg daily  - Started on metoprolol 50 q12  - Risperidone d/c'd as her mechanical fall may be related to over-sedation with her home psych meds  - 1 mg haloperidol given overnight for agitation    SUBJECTIVE/ROS:  [ ] A ten-point review of systems was otherwise negative except as noted.  [ ] Due to altered mental status/intubation, subjective information were not able to be obtained from the patient. History was obtained, to the extent possible, from review of the chart and collateral sources of information.      NEURO  RASS:     GCS:     CAM ICU:  Exam: awake, alert, responding appropriately at times  Meds: acetaminophen   Tablet .. 650 milliGRAM(s) Oral every 6 hours PRN Mild Pain (1 - 3)  diVALproex  milliGRAM(s) Oral daily  traZODone 200 milliGRAM(s) Oral at bedtime    [x] Adequacy of sedation and pain control has been assessed and adjusted      RESPIRATORY  RR: 19 (11-12-19 @ 01:00) (14 - 31)  SpO2: 95% (11-12-19 @ 01:00) (95% - 100%)  Wt(kg): --  Exam: unlabored, equal chest rise  Mechanical Ventilation:     [N/A] Extubation Readiness Assessed  Meds:       CARDIOVASCULAR  HR: 91 (11-12-19 @ 01:00) (75 - 104)  BP: 159/74 (11-12-19 @ 01:00) (108/57 - 191/84)  BP(mean): 106 (11-12-19 @ 01:00) (77 - 120)  ABP: --  ABP(mean): --  Wt(kg): --  CVP(cm H2O): --  VBG - ( 10 Nov 2019 03:16 )  pH: 7.39  /  pCO2: 52    /  pO2: 47    / HCO3: 31    / Base Excess: 5.8   /  SaO2: 80     Lactate: 1.2        Exam: regular rate and rhythm  Cardiac Rhythm: sinus  Perfusion     [x]Adequate   [ ]Inadequate  Mentation   [x]Normal       [ ]Reduced  Extremities  [x]Warm         [ ]Cool  Volume Status [ ]Hypervolemic [x]Euvolemic [ ]Hypovolemic  Meds: metoprolol tartrate 50 milliGRAM(s) Oral two times a day        GI/NUTRITION  Exam: soft, nontender, nondistended  Diet: regular  Meds:     GENITOURINARY  I&O's Detail    11-10 @ 07:01  -  11-11 @ 07:00  --------------------------------------------------------  IN:    lactated ringers.: 900 mL    Oral Fluid: 1680 mL  Total IN: 2580 mL    OUT:    Voided: 2125 mL  Total OUT: 2125 mL    Total NET: 455 mL      11-11 @ 07:01  -  11-12 @ 01:19  --------------------------------------------------------  IN:    Oral Fluid: 1000 mL  Total IN: 1000 mL    OUT:    Voided: 1100 mL  Total OUT: 1100 mL    Total NET: -100 mL          11-12    134<L>  |  100  |  9   ----------------------------<  134<H>  3.7   |  23  |  0.42<L>    Ca    9.3      12 Nov 2019 00:17  Phos  3.0     11-12  Mg     2.0     11-12      [ ] Fowler catheter, indication: N/A  Meds:       HEMATOLOGIC  Meds: aspirin  chewable 81 milliGRAM(s) Oral daily  enoxaparin Injectable 70 milliGRAM(s) SubCutaneous every 12 hours    [x] VTE Prophylaxis                        8.1    8.99  )-----------( 355      ( 12 Nov 2019 00:17 )             25.7     PT/INR - ( 12 Nov 2019 00:17 )   PT: 11.1 sec;   INR: 0.97 ratio         PTT - ( 12 Nov 2019 00:17 )  PTT:29.4 sec  Transfusion     [ ] PRBC   [ ] Platelets   [ ] FFP   [ ] Cryoprecipitate      INFECTIOUS DISEASES  WBC Count: 8.99 K/uL (11-12 @ 00:17)    RECENT CULTURES:  Specimen Source: .Blood Blood-Peripheral  Date/Time: 11-09 @ 23:16  Culture Results:   No growth to date.  Gram Stain: --  Organism: --    Meds: influenza   Vaccine 0.5 milliLiter(s) IntraMuscular once        ENDOCRINE  CAPILLARY BLOOD GLUCOSE      POCT Blood Glucose.: 131 mg/dL (11 Nov 2019 08:39)    Meds: atorvastatin 80 milliGRAM(s) Oral at bedtime        ACCESS DEVICES:  [ ] Peripheral IV  [ ] Central Venous Line	[ ] R	[ ] L	[ ] IJ	[ ] Fem	[ ] SC	Placed:   [ ] Arterial Line		[ ] R	[ ] L	[ ] Fem	[ ] Rad	[ ] Ax	Placed:   [ ] PICC:					[ ] Mediport  [ ] Urinary Catheter, Date Placed:   [x] Necessity of urinary, arterial, and venous catheters discussed    OTHER MEDICATIONS:      CODE STATUS:      IMAGING:

## 2019-11-12 NOTE — DIETITIAN INITIAL EVALUATION ADULT. - ADD RECOMMEND
1) Recommend to continue diet with no therapeutic diet; consider consistent carbohydrate diet if persistently elevated FSBG. Defer consistency/texture to medical team, SLP. 2) Monitor wt trends, nutrition related labs, skin integrity, hydration status. 3) Encourage and monitor PO intake. Staff to assist in feeding/menus PRN.

## 2019-11-12 NOTE — DIETITIAN INITIAL EVALUATION ADULT. - REASON INDICATOR FOR ASSESSMENT
Nutrition Assessment warranted for length of stay.  Information obtained from: RN, comprehensive chart review, interdisciplinary medical rounds, patient

## 2019-11-12 NOTE — DIETITIAN INITIAL EVALUATION ADULT. - ENERGY NEEDS
Ht: 62"   Wt: 153.6 lbs  BMI: 28.1 kg/m2   IBW: 110 lbs  (+/-10%)     140 % IBW  Edema: 2+ right leg;  right foot          Skin: skin lesions (transverse lower abdomen/ecchymosis, right thigh/hematoma)

## 2019-11-12 NOTE — CHART NOTE - NSCHARTNOTEFT_GEN_A_CORE
SICU Transfer Note    Transfer from: SICU  Transfer to:  9M  Accepting physician: THAD Stewart     69F PMHx CVA on ASA, plavix, and Tlovenox BIBEMS from rehab facility after a suspected fall, initially brought to the main ED area but Level 2 called when she was hypoxic. Primary survey intact, GCS difficult to assess given baseline mental status of AXO x1. Secondary survey significant for left supraorbital edema and ecchymosis with superficial abrasion, RUQ abdominal tenderness, lower abdominal skin ecchymosis, RLE ecchymosis and swelling, RLE thigh firm, nontender, full ROM and full sensation.      SICU COURSE:    11/10 24 HOUR EVENTS:  - Cleared off hemorrhage watch protocol  - Lactate cleared.   - No overnight events    11/11 24 HOUR EVENTS:   - Non-con CT scan of thigh obtained   - Therapeutic AC restarted; ASA/plavix still on hold     11/12 24 HOUR EVENTS:  - Restarted on aspirin 81 mg daily  - Started on metoprolol 50 q12  - Risperidone d/c'd as her mechanical fall may be related to over-sedation with her home psych meds  - 1 mg haloperidol given overnight for agitation      ASSESSMENT & PLAN:     69F PMHx CVA on ASA, plavix, and Tlovenox BIBEMS from rehab facility after a suspected fall, initially brought to the main ED area but Level 2 called when she was hypoxic. Primary survey intact, GCS difficult to assess given baseline mental status of AXO x1. Secondary survey significant for left supraorbital edema and ecchymosis with superficial abrasion, RUQ abdominal tenderness, lower abdominal skin ecchymosis, RLE ecchymosis and swelling, RLE thigh firm, nontender, full ROM and full sensation.     Of note, patient had a recent fall in which she had a hematoma of the R thigh per her brother. CTA negative for extravasation. H/H stabilized, lactic acidosis resolved. Repeat CT showed heterogeneous mass/collection in the right proximal vastus   muscles, unchanged from the prior CT.  Transferred to floor in stable condition.     Plan:  - Pain control  - Regular diet  - c/w ASA, T. lovenox  - Discharge planning back to [facility]    For Follow-Up:          Vital Signs Last 24 Hrs  T(C): 37.1 (12 Nov 2019 16:50), Max: 37.1 (12 Nov 2019 15:00)  T(F): 98.8 (12 Nov 2019 16:50), Max: 98.8 (12 Nov 2019 15:00)  HR: 94 (12 Nov 2019 16:50) (75 - 104)  BP: 163/77 (12 Nov 2019 16:50) (108/57 - 197/97)  BP(mean): 110 (12 Nov 2019 16:50) (77 - 138)  RR: 19 (12 Nov 2019 16:50) (15 - 34)  SpO2: 98% (12 Nov 2019 16:50) (94% - 100%)  I&O's Summary    11 Nov 2019 07:01  -  12 Nov 2019 07:00  --------------------------------------------------------  IN: 1100 mL / OUT: 1850 mL / NET: -750 mL    12 Nov 2019 07:01  -  12 Nov 2019 17:06  --------------------------------------------------------  IN: 1300 mL / OUT: 1400 mL / NET: -100 mL          MEDICATIONS  (STANDING):  aspirin  chewable 81 milliGRAM(s) Oral daily  atorvastatin 80 milliGRAM(s) Oral at bedtime  diVALproex  milliGRAM(s) Oral daily  enoxaparin Injectable 70 milliGRAM(s) SubCutaneous every 12 hours  influenza   Vaccine 0.5 milliLiter(s) IntraMuscular once  metoprolol tartrate 50 milliGRAM(s) Oral two times a day  risperiDONE  Disintegrating Tablet 0.25 milliGRAM(s) Oral daily  traZODone 200 milliGRAM(s) Oral at bedtime    MEDICATIONS  (PRN):  acetaminophen   Tablet .. 650 milliGRAM(s) Oral every 6 hours PRN Mild Pain (1 - 3)        LABS                                            8.1                   Neurophils% (auto):   x      (11-12 @ 00:17):    8.99 )-----------(355          Lymphocytes% (auto):  x                                             25.7                   Eosinphils% (auto):   x        Manual%: Neutrophils x    ; Lymphocytes x    ; Eosinophils x    ; Bands%: x    ; Blasts x                                    134    |  100    |  9                   Calcium: 9.3   / iCa: x      (11-12 @ 00:17)    ----------------------------<  134       Magnesium: 2.0                              3.7     |  23     |  0.42             Phosphorous: 3.0        ( 11-12 @ 00:17 )   PT: 11.1 sec;   INR: 0.97 ratio  aPTT: 29.4 sec

## 2019-11-12 NOTE — DIETITIAN INITIAL EVALUATION ADULT. - OTHER INFO
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 Admitted from rehab facility after a suspected fall, initially brought to the main ED area but Level 2 called when she was hypoxic. Primary survey intact, GCS difficult to assess given baseline mental status of AXO x1". CTA of RLE demonstrating intramuscular hematoma; pt transferred to SICU for hemorrhage watch. 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 Admitted from rehab facility after a suspected fall, initially brought to the main ED area but Level 2 called when she was hypoxic. Primary survey intact, GCS difficult to assess given baseline mental status of AXO x1". CTA of RLE demonstrating intramuscular hematoma; pt transferred to SICU for hemorrhage watch.    Pt A+Ox1; unable to participate in nutrition evaluation. No family present at bedside at this time. Per discussion with RN, pt with good appetite at lunch this afternoon. Consumed sandwich, cucumber salad and cheesecake with no apparent s/s of intolerance. Pt admitted from the [facility] (assisted living), in which meals are prepared daily. No overt s/s malnutrition noted. Appears well-nourished based on visual observation.     Pt noted with history of DM; A1c 6.3%. Per H&P, pt with history of taking Humalog, Metformin. Noted with history of taking daily Vit D3. Diet education deferred 2/2 mental status.     Last BM (11/11): x 3. No documentation of nausea/vomiting, constipation or diarrhea noted. Per review of nursing flow sheet, pt consumed 100% of meal on 11/10. Will continue to monitor.

## 2019-11-12 NOTE — PROGRESS NOTE ADULT - ASSESSMENT
69F PMHx CVA on ASA, plavix, and Tlovenox BIBEMS from rehab facility after a suspected fall, initially brought to the main ED area but Level 2 called when she was hypoxic. Primary survey intact, GCS difficult to assess given baseline mental status of AXO x1. Secondary survey significant for left supraorbital edema and ecchymosis with superficial abrasion, RUQ abdominal tenderness, lower abdominal skin ecchymosis, RLE ecchymosis and swelling, RLE thigh firm, nontender, full ROM and full sensation.     Of note, patient had a recent fall in which she had a hematoma of the R thigh per her brother. CTA negative for extravasation. H/H stabilized, lactic acidosis resolved. Repeat CT showed heterogeneous mass/collection in the right proximal vastus   muscles, unchanged from the prior CT    Plan:  - Pain control  - Regular diet  - c/w ASA, T. lovenox  - Discharge planning back to [facility]    ACS & Trauma, [id number]

## 2019-11-13 ENCOUNTER — TRANSCRIPTION ENCOUNTER (OUTPATIENT)
Age: 69
End: 2019-11-13

## 2019-11-13 LAB
ANION GAP SERPL CALC-SCNC: 11 MMOL/L — SIGNIFICANT CHANGE UP (ref 5–17)
APPEARANCE UR: ABNORMAL
APTT BLD: 32.8 SEC — SIGNIFICANT CHANGE UP (ref 27.5–36.3)
BILIRUB UR-MCNC: ABNORMAL
BUN SERPL-MCNC: 10 MG/DL — SIGNIFICANT CHANGE UP (ref 7–23)
CALCIUM SERPL-MCNC: 9.7 MG/DL — SIGNIFICANT CHANGE UP (ref 8.4–10.5)
CHLORIDE SERPL-SCNC: 104 MMOL/L — SIGNIFICANT CHANGE UP (ref 96–108)
CO2 SERPL-SCNC: 24 MMOL/L — SIGNIFICANT CHANGE UP (ref 22–31)
COLOR SPEC: YELLOW — SIGNIFICANT CHANGE UP
CREAT SERPL-MCNC: 0.45 MG/DL — LOW (ref 0.5–1.3)
DIFF PNL FLD: ABNORMAL
GLUCOSE SERPL-MCNC: 155 MG/DL — HIGH (ref 70–99)
GLUCOSE UR QL: ABNORMAL
HCT VFR BLD CALC: 27.8 % — LOW (ref 34.5–45)
HGB BLD-MCNC: 9 G/DL — LOW (ref 11.5–15.5)
INR BLD: 0.94 RATIO — SIGNIFICANT CHANGE UP (ref 0.88–1.16)
KETONES UR-MCNC: SIGNIFICANT CHANGE UP
LEUKOCYTE ESTERASE UR-ACNC: NEGATIVE — SIGNIFICANT CHANGE UP
MAGNESIUM SERPL-MCNC: 1.9 MG/DL — SIGNIFICANT CHANGE UP (ref 1.6–2.6)
MCHC RBC-ENTMCNC: 32.4 GM/DL — SIGNIFICANT CHANGE UP (ref 32–36)
MCHC RBC-ENTMCNC: 34.9 PG — HIGH (ref 27–34)
MCV RBC AUTO: 107.8 FL — HIGH (ref 80–100)
NITRITE UR-MCNC: NEGATIVE — SIGNIFICANT CHANGE UP
NRBC # BLD: 0 /100 WBCS — SIGNIFICANT CHANGE UP (ref 0–0)
PH UR: 6.5 — SIGNIFICANT CHANGE UP (ref 5–8)
PHOSPHATE SERPL-MCNC: 3.5 MG/DL — SIGNIFICANT CHANGE UP (ref 2.5–4.5)
PLATELET # BLD AUTO: 392 K/UL — SIGNIFICANT CHANGE UP (ref 150–400)
POTASSIUM SERPL-MCNC: 3.8 MMOL/L — SIGNIFICANT CHANGE UP (ref 3.5–5.3)
POTASSIUM SERPL-SCNC: 3.8 MMOL/L — SIGNIFICANT CHANGE UP (ref 3.5–5.3)
PROT UR-MCNC: ABNORMAL
PROTHROM AB SERPL-ACNC: 10.8 SEC — SIGNIFICANT CHANGE UP (ref 10–12.9)
RBC # BLD: 2.58 M/UL — LOW (ref 3.8–5.2)
RBC # FLD: 21.6 % — HIGH (ref 10.3–14.5)
SODIUM SERPL-SCNC: 139 MMOL/L — SIGNIFICANT CHANGE UP (ref 135–145)
SP GR SPEC: 1.03 — HIGH (ref 1.01–1.02)
UROBILINOGEN FLD QL: ABNORMAL
WBC # BLD: 8.23 K/UL — SIGNIFICANT CHANGE UP (ref 3.8–10.5)
WBC # FLD AUTO: 8.23 K/UL — SIGNIFICANT CHANGE UP (ref 3.8–10.5)

## 2019-11-13 PROCEDURE — 93010 ELECTROCARDIOGRAM REPORT: CPT

## 2019-11-13 PROCEDURE — 99231 SBSQ HOSP IP/OBS SF/LOW 25: CPT

## 2019-11-13 RX ADMIN — Medication 200 MILLIGRAM(S): at 21:38

## 2019-11-13 RX ADMIN — RISPERIDONE 0.25 MILLIGRAM(S): 4 TABLET ORAL at 12:01

## 2019-11-13 RX ADMIN — ENOXAPARIN SODIUM 70 MILLIGRAM(S): 100 INJECTION SUBCUTANEOUS at 10:01

## 2019-11-13 RX ADMIN — Medication 50 MILLIGRAM(S): at 10:02

## 2019-11-13 RX ADMIN — DIVALPROEX SODIUM 500 MILLIGRAM(S): 500 TABLET, DELAYED RELEASE ORAL at 12:01

## 2019-11-13 RX ADMIN — Medication 650 MILLIGRAM(S): at 12:40

## 2019-11-13 RX ADMIN — ATORVASTATIN CALCIUM 80 MILLIGRAM(S): 80 TABLET, FILM COATED ORAL at 21:38

## 2019-11-13 RX ADMIN — Medication 50 MILLIGRAM(S): at 21:38

## 2019-11-13 RX ADMIN — AMLODIPINE BESYLATE 10 MILLIGRAM(S): 2.5 TABLET ORAL at 05:37

## 2019-11-13 RX ADMIN — Medication 81 MILLIGRAM(S): at 12:00

## 2019-11-13 RX ADMIN — Medication 650 MILLIGRAM(S): at 11:55

## 2019-11-13 RX ADMIN — ENOXAPARIN SODIUM 70 MILLIGRAM(S): 100 INJECTION SUBCUTANEOUS at 21:38

## 2019-11-13 NOTE — DISCHARGE NOTE PROVIDER - CARE PROVIDERS DIRECT ADDRESSES
,[email or web address],DirectAddress_Unknown,DirectAddress_Unknown

## 2019-11-13 NOTE — PROGRESS NOTE ADULT - SUBJECTIVE AND OBJECTIVE BOX
Subjective:  Patient seen and evaluated this AM with team.  Denies any acute concerns this morning.  States she is feeling better.    OBJECTIVE:     ** PHYSICAL EXAM **    Gen: NAD, lying comfortably in bed  HEENT: Head lac, not actively bleeding  RESP: nonlabored breathing  ABDOMEN: soft, non-distended, mildly tender  Extremities: R thigh mildly indurated, compartments soft      ** VITAL SIGNS / I&O's **    Vital Signs Last 24 Hrs  T(C): 37.1 (13 Nov 2019 13:09), Max: 37.6 (13 Nov 2019 10:56)  T(F): 98.8 (13 Nov 2019 13:09), Max: 99.7 (13 Nov 2019 11:33)  HR: 105 (13 Nov 2019 13:09) (91 - 120)  BP: 160/81 (13 Nov 2019 13:09) (123/76 - 197/97)  BP(mean): 110 (12 Nov 2019 16:50) (110 - 138)  RR: 16 (13 Nov 2019 13:09) (16 - 31)  SpO2: 94% (13 Nov 2019 13:09) (93% - 98%)      12 Nov 2019 07:01  -  13 Nov 2019 07:00  --------------------------------------------------------  IN:    Oral Fluid: 2300 mL  Total IN: 2300 mL    OUT:    Voided: 2450 mL  Total OUT: 2450 mL    Total NET: -150 mL      13 Nov 2019 07:01  -  13 Nov 2019 13:37  --------------------------------------------------------  IN:    Oral Fluid: 280 mL  Total IN: 280 mL    OUT:  Total OUT: 0 mL    Total NET: 280 mL            ** LABS **                          9.0    8.23  )-----------( 392      ( 13 Nov 2019 06:12 )             27.8     13 Nov 2019 06:12    139    |  104    |  10     ----------------------------<  155    3.8     |  24     |  0.45     Ca    9.7        13 Nov 2019 06:12  Phos  3.5       13 Nov 2019 06:12  Mg     1.9       13 Nov 2019 06:12      PT/INR - ( 13 Nov 2019 06:12 )   PT: 10.8 sec;   INR: 0.94 ratio         PTT - ( 13 Nov 2019 06:12 )  PTT:32.8 sec      MEDICATIONS  (STANDING):  amLODIPine   Tablet 10 milliGRAM(s) Oral daily  aspirin  chewable 81 milliGRAM(s) Oral daily  atorvastatin 80 milliGRAM(s) Oral at bedtime  diVALproex  milliGRAM(s) Oral daily  enoxaparin Injectable 70 milliGRAM(s) SubCutaneous every 12 hours  influenza   Vaccine 0.5 milliLiter(s) IntraMuscular once  metoprolol tartrate 50 milliGRAM(s) Oral two times a day  risperiDONE  Disintegrating Tablet 0.25 milliGRAM(s) Oral daily  traZODone 200 milliGRAM(s) Oral at bedtime    MEDICATIONS  (PRN):  acetaminophen   Tablet .. 650 milliGRAM(s) Oral every 6 hours PRN Mild Pain (1 - 3)

## 2019-11-13 NOTE — DISCHARGE NOTE PROVIDER - HOSPITAL COURSE
69F PMHx CVA on ASA, plavix, and Tlovenox BIBEMS from rehab facility 11/9/19 after a suspected fall, initially brought to the main ED area but Level 2 called when she was hypoxic. Primary survey intact, GCS difficult to assess given baseline mental status of AXO x1. Secondary survey significant for left supraorbital edema and ecchymosis with superficial abrasion, RUQ abdominal tenderness, lower abdominal skin ecchymosis, RLE ecchymosis and swelling, RLE thigh firm, nontender, full ROM and full sensation.  Pt was found to have significant H/H drop from baseline 22 from 40 and lactic acidosis.  Plan for CTA of RLE.    Imaging showing:, No acute fractures, SubQ soft tissue contusion involving the right hip, proximal femur, Aneurysmal dilatation of ascending thoracic aorta, measuring up to 4.3 cm    Small complex cyst of Left ovary.  She was admitted to the surgical service.  CTA of RLE demonstrating intramuscular hematoma, patient transferred to SICU for hemorrhage watch.  Antiplatelet and anticoagulants held.  In SICU, tachycardia improved, lactic acidosis resolved    not requiring transfusion in SICU, there was no evidence of ongoing bleeding from right thigh hematoma so will complete hemorrhage watch protocol.    She was evaluated by physical therapy, who recommended subacute rehab upon discharge.    Neurology was consulted for ? of Restarting Therapeutic Lovenox  after L thalamic infarct. Recommendations C/w therapeutic Lovenox for PE and prior hx of suspected Embolic strokes (vs Symptomatic Intracranial Atherosclerosis requiring ASA/Plavix: defer triple therapy due to risk>benefit).  Recommended STAT Repeat CT Head Noncontrast w/ mental status change    Can consider repeat MRI Brain w/o to better discern nature of most recent CT Head Noncontrast findings. 69F PMHx CVA on ASA, plavix, and Tlovenox BIBEMS from rehab facility 11/9/19 after a suspected fall, initially brought to the main ED area but Level 2 called when she was hypoxic. Primary survey intact, GCS difficult to assess given baseline mental status of AXO x1. Secondary survey significant for left supraorbital edema and ecchymosis with superficial abrasion, RUQ abdominal tenderness, lower abdominal skin ecchymosis, RLE ecchymosis and swelling, RLE thigh firm, nontender, full ROM and full sensation.  Pt was found to have significant H/H drop from baseline 22 from 40 and lactic acidosis.  Plan for CTA of RLE.    Imaging showing:, No acute fractures, SubQ soft tissue contusion involving the right hip, proximal femur, Aneurysmal dilatation of ascending thoracic aorta, measuring up to 4.3 cm    Small complex cyst of Left ovary.  She was admitted to the surgical service.  CTA of RLE demonstrating intramuscular hematoma, patient transferred to SICU for hemorrhage watch.  Antiplatelet and anticoagulants held.  In SICU, tachycardia improved, lactic acidosis resolved    not requiring transfusion in SICU, there was no evidence of ongoing bleeding from right thigh hematoma so will complete hemorrhage watch protocol.    She was evaluated by physical therapy, who recommended subacute rehab upon discharge.    Neurology was consulted for ? of Restarting Therapeutic Lovenox  after L thalamic infarct. Recommendations C/w therapeutic Lovenox for PE and prior hx of suspected Embolic strokes (vs Symptomatic Intracranial Atherosclerosis requiring ASA/Plavix: defer triple therapy due to risk>benefit).  Recommended STAT Repeat CT Head Noncontrast w/ mental status change, consider repeat MRI Brain w/o to better discern nature of most recent CT Head Noncontrast findings. 69F PMHx CVA on ASA, plavix, and Tlovenox BIBEMS from rehab facility 11/9/19 after a suspected fall, initially brought to the main ED area but Level 2 called when she was hypoxic. Primary survey intact, GCS difficult to assess given baseline mental status of AXO x1. Secondary survey significant for left supraorbital edema and ecchymosis with superficial abrasion, RUQ abdominal tenderness, lower abdominal skin ecchymosis, RLE ecchymosis and swelling, RLE thigh firm, nontender, full ROM and full sensation.  Pt was found to have significant H/H drop from baseline 22 from 40 and lactic acidosis.  Plan for CTA of RLE.        Imaging showing:, No acute fractures, SubQ soft tissue contusion involving the right hip, proximal femur, Aneurysmal dilatation of ascending thoracic aorta, measuring up to 4.3 cm  Head CT: IMPRESSION: No acute intracranial hemorrhage, mass effect or midline shift. No displaced calvarial fracture. Large left frontal scalp hematoma. Age-indeterminate lacunar infarct involving the left thalamus, new since     9/27/2019. Redemonstrated chronic infarcts in the right frontal lobe and left corona radiata.  CT CERVICAL SPINE: No acute fracture or subluxation. Moderate multilevel cervical spondylosis.    Small complex cyst of Left ovary.  She was admitted to the surgical service.  CTA of RLE demonstrating intramuscular hematoma, patient transferred to SICU for hemorrhage watch.  Antiplatelet and anticoagulants held.  In SICU, tachycardia improved, lactic acidosis resolved.        Patient did not require transfusion in SICU, there was no evidence of ongoing bleeding from right thigh hematoma so will complete hemorrhage watch protocol. She was evaluated by physical therapy, who recommended subacute rehab upon discharge.        Neurology was consulted for Restarting Therapeutic Lovenox  after L thalamic infarct. Recommendations C/w therapeutic Lovenox for PE and prior hx of suspected Embolic strokes (vs Symptomatic Intracranial Atherosclerosis requiring ASA/Plavix: defer triple therapy due to risk>benefit) STAT Repeat CT Head Noncontrast w/ mental status change. Can consider repeat MRI Brain w/o to better discern nature of most recent CT Head Noncontrast findings.  Heme/Onc was consulted for history of PE and recommended ASA/T. Lovenox.        11/12- Restarted on aspirin 81 mg daily.  Started on metoprolol 50 q12. Risperidone d/c'd as her mechanical fall may be related to over-sedation with her home psych meds. 1 mg haloperidol given overnight for agitation.        Patient was transferred from SICU to surgical floor in stable condition.  The patient remained stable on the floor and discharge planning to [facility] was initiated.        On day of discharge, the patient was tolerating diet, ambulating well and pain controlled. The patient will be discharged to [facility] with outpatient follow up with Dr. Dewitt within 1-2 weeks.

## 2019-11-13 NOTE — PROGRESS NOTE ADULT - ASSESSMENT
69F PMHx CVA on ASA, plavix, and Tlovenox BIBEMS from rehab facility after a suspected fall, initially brought to the main ED area but Level 2 called when she was hypoxic.  Patient found to have a LLE hematoma.    Plan:  - dc posey vest  - UA/EKG for tachy workup  - Will resume T-lovenox and ASA when dc home

## 2019-11-13 NOTE — DISCHARGE NOTE PROVIDER - CARE PROVIDER_API CALL
Suleman Dewitt (MD)  Surgery; Surgical Critical Care  [address], [address]  [place] [address]  Phone: [phone]  Fax: [phone]  Follow Up Time: 1 week    Matthew Walters (DO)  Neurology; Vascular Neurology  [address]  [place] [address]  Phone: [phone]  Fax: [phone]  Follow Up Time: 1 week Suleman Dewitt (MD)  Surgery; Surgical Critical Care  [address], [address]  [place] [address]  Phone: [phone]  Fax: [phone]  Follow Up Time: 1 week    Matthew Walters (DO)  Neurology; Vascular Neurology  [address]  [place] [address]  Phone: [phone]  Fax: [phone]  Follow Up Time: 1 week    Goldberg, Bradley H (MD)  Internal Medicine  [address] [place] [address]  Phone: [phone]  Fax: [phone]  Follow Up Time: 2 weeks

## 2019-11-13 NOTE — DISCHARGE NOTE PROVIDER - NSDCDCMDCOMP_GEN_ALL_CORE
Anemia Management Note - Enrollment  SUBJECTIVE/OBJECTIVE:    Referred by Jovanna Cazares CNP on 11/6/2017  Primary Diagnosis: Anemia in Chronic Kidney Disease (N18.3, D63.1)     Secondary Diagnosis:  Chronic Kidney Disease, Stage 3 (N18.3)   Hgb goal range:  9-10  Epo/Darbo:  None  Iron regimen: None  Labs exp: 11/7/18     Anemia Latest Ref Rng & Units 10/27/2017 10/30/2017 11/1/2017 11/3/2017 11/7/2017 11/10/2017 11/15/2017   DIPAK Dose - 25 mcg - - - - - -   Hemoglobin 13.3 - 17.7 g/dL 6.9(LL) 8.4(L) 8.5(L) 8.0(L) 8.3(L) 8.6(L) 8.3(L)   TSAT 15 - 46 % - - - - - 55(H) 50(H)   Ferritin 26 - 388 ng/mL - - - - - 1824(H) 1748(H)   PRBCs - 1 unit - - - - - -       BP Readings from Last 3 Encounters:   11/10/17 146/87   11/07/17 (!) 159/91   11/01/17 151/87     Wt Readings from Last 2 Encounters:   11/10/17 181 lb 3.2 oz (82.2 kg)   11/07/17 181 lb 3.2 oz (82.2 kg)     Current Outpatient Prescriptions   Medication Sig Dispense Refill     magnesium oxide (MAG-OX) 400 (241.3 MG) MG tablet Take 1 tablet (400 mg) by mouth 2 times daily 180 tablet 3     oxyCODONE IR (ROXICODONE) 5 MG tablet Take 1 tablet (5 mg) by mouth every 3 hours as needed for pain or other (Moderate to Severe) 15 tablet 0     tacrolimus (GENERIC EQUIVALENT) 1 MG capsule Take 1 capsule (1 mg) by mouth every 12 hours 60 capsule 11     fludrocortisone (FLORINEF) 0.1 MG tablet Take 3 tablets (0.3 mg) by mouth daily 90 tablet 3     amLODIPine (NORVASC) 10 MG tablet Take 1 tablet (10 mg) by mouth daily (Patient taking differently: Take 10 mg by mouth every morning ) 90 tablet 3     oxyCODONE IR (ROXICODONE) 10 MG tablet Take 1 tablet (10 mg) by mouth daily as needed for moderate to severe pain 30 tablet 0     CIALIS 5 MG tablet Take 5 mg by mouth as needed   1     furosemide (LASIX) 20 MG tablet Take 1 tablet (20 mg) by mouth 2 times daily 60 tablet 3     predniSONE (DELTASONE) 10 MG tablet Take 1 tablet (10 mg) by mouth daily (Patient taking differently: Take 10  mg by mouth every morning ) 30 tablet 11     mycophenolic acid (GENERIC EQUIVALENT) 360 MG EC tablet Take 1 tablet (360 mg) by mouth every 6 hours (Patient taking differently: Take 720 mg by mouth 2 times daily ) 120 tablet 5     patiromer (VELTASSA) 8.4 G packet Take 8.4 g by mouth daily 30 each 3     sodium bicarbonate 650 MG tablet Take 2 tablets (1,300 mg) by mouth 3 times daily 180 tablet 3     OMEPRAZOLE PO Take 20 mg by mouth every morning        GABAPENTIN PO Take 300 mg by mouth 3 times daily       insulin glargine (LANTUS) 100 UNIT/ML injection Inject 50 Units Subcutaneous every morning       linagliptin (TRADJENTA) 5 MG TABS tablet Take 5 mg by mouth every evening        loperamide (IMODIUM) 2 MG capsule Take 2 capsules (4 mg) by mouth 2 times daily 120 capsule 3     cyclobenzaprine (FLEXERIL) 10 MG tablet Take 1 tablet (10 mg) by mouth 3 times daily as needed for muscle spasms 90 tablet 0     ASSESSMENT:  Hgb Not at goal   Ferritin: Elevated (>1000ng/mL)  TSat: at goal >30%  Iron regimen recommended: None  Recommended DIPAK regimen: Aranesp 60mcg every 2 weeks    PLAN:  1. Patient called today for enrollment in Anemia Management Service.  2. Discussed:  anemia overview, monitoring service and goal hemoglobin range and rationale and risks of DIPAK blood clots, stroke and increase in blood pressure; and cancer risk  3. Dose location: [facility]  4. Labs: D  5. Pharmacy: [facility]  6. Will discuss clot/IVC filter concerns, and HCC concerns with Dr Camejo, Dr Hernández, Jovanna Cazares  7. Send new patient letter with medication guide to patient if/when DIPAK initiated.     Patient verbalized understanding of the plan.     Next call date:  11/17    Anemia Management Service  Sabine Sanchez,MikelD, BCACP and Estefani Rosales CPhT  Phone: [phone]  Fax: [phone]    This document is complete and the patient is ready for discharge.

## 2019-11-13 NOTE — PROGRESS NOTE ADULT - ATTENDING COMMENTS
Awake non focal, restart Risperdal for delirium last night treated with a dose of Haldol  HCT have been stable  Continue ASA for CVA and full dose AC for PE  Add Norvasc for uncontrolled HTN  PT  Transfer
Pt seen and examined on 11/12, agree with above. Stable posthemorrhagic anemia on ASA and therapeutic lovenox (for PE). Awaiting WES.
Pt seen and examined on 11/13, agree with above. Stable posthemorrhagic anemia related to right thigh hematoma. Mild tachycardia resolved (EKG sinus tach). Afebrile. Awaiting WES.
Pt seen and examined. Chart reviewed. Resident note confirmed. Pt is a 69 year old female with a medical history signficant for Dementia/CVA/PE and multiple falls who presented to [facility] s/p fall with a thigh hematoma. Imaging was equivocal for bleeding. Pt received 1U PRBC for H/H 6.6/21 with good effect. Hemorrhage protocol was ended after three stable hematocrits. No acute events overnight.      PMH/PSH/MEDS/ALL/SH/FH/ROS: Unchanged from H&P  Vitals/PE/Labs/Radiographs: Reviewed    A/p  Neuro: 	Traumatic pain  	Dementia  	h/o CVA  	Continue PRN pain meds  	Continue trazadone, divalproex and risperidone  	Continue ASA/plavix    CVS:	H/o PE  	T. lovenox is on hold  	Monitor vitals    Pulm:	Atelectasis  	ISP    GI:	No active issues  	Resume diet    :	No active issues  	Monitor and replace lytes    Heme:	Anemia   	Monitor H/h    ID:	No active issues  	Culture for fever.    Endo:	Continue glycemic control
Stable low HCT with traumatic  rt thigh hematoma  Start ASA for h/o CVA, hold off Plavix, continue full dose AC with Lovenox for PE  Start BB for HTN  PT  Transfer
Pt seen and examined, agree with above. Pt without complaints of leg/ thigh pain, is pleasant and answers questions, but has advanced dementia so unable to obtain full HPI. Her RLE is soft, with extensive ecchymosis. Hct stable. On therapeutic lovenox for recent PE, off ASA/ Plavix for now.

## 2019-11-13 NOTE — DISCHARGE NOTE PROVIDER - NSDCACTIVITY_GEN_ALL_CORE
Stairs allowed/Showering allowed/Walking - Indoors allowed/No heavy lifting/straining/Walking - Outdoors allowed

## 2019-11-13 NOTE — DISCHARGE NOTE PROVIDER - PROVIDER TOKENS
PROVIDER:[TOKEN:[[id number]],FOLLOWUP:[1 week]],PROVIDER:[TOKEN:[[id number]],FOLLOWUP:[1 week]] PROVIDER:[TOKEN:[[id number]],FOLLOWUP:[1 week]],PROVIDER:[TOKEN:[[id number]],FOLLOWUP:[1 week]],PROVIDER:[TOKEN:[[id number]],FOLLOWUP:[2 weeks]]

## 2019-11-13 NOTE — DISCHARGE NOTE PROVIDER - NSDCCPCAREPLAN_GEN_ALL_CORE_FT
PRINCIPAL DISCHARGE DIAGNOSIS  Diagnosis: Anemia due to acute blood loss  Assessment and Plan of Treatment:       SECONDARY DISCHARGE DIAGNOSES  Diagnosis: Trauma  Assessment and Plan of Treatment: PRINCIPAL DISCHARGE DIAGNOSIS  Diagnosis: Thigh hematoma  Assessment and Plan of Treatment: ACTIVITY: No heavy lifting anything more than 10-15lbs or straining. Otherwise, you may return to your usual level of physical activity. If you are taking narcotic pain medication (such as Percocet), do NOT drive a car, operate machinery or make important decisions.  NOTIFY YOUR SURGEON IF:  any fever (over 100.4 F) or chills, persistent nausea/vomiting with inability to tolerate food or liquids, persistent diarrhea, or if your pain is not controlled on your discharge pain medications.  FOLLOW-UP:  1. Please call to make a follow-up appointment within one week of discharge   2. Please follow up with your primary care physician in one week regarding your hospitalization.      SECONDARY DISCHARGE DIAGNOSES  Diagnosis: History of pulmonary embolism  Assessment and Plan of Treatment: Please continue ASA/Lovenox.  Please follow up with your hematologist within 1-2 weeks of discharge.

## 2019-11-13 NOTE — DISCHARGE NOTE PROVIDER - NSDCMRMEDTOKEN_GEN_ALL_CORE_FT
ascorbic acid 500 mg oral tablet: 1 tab(s) orally once a day  aspirin 81 mg oral tablet: 1 tab(s) orally once a day  Aspirin Enteric Coated 81 mg oral delayed release tablet: 2 tab(s) orally once a day  atorvastatin 80 mg oral tablet: 1 tab(s) orally once a day (at bedtime)  atorvastatin 80 mg oral tablet: 1 tab(s) orally once a day  cholecalciferol oral tablet: 1000 unit(s) orally once a day  clopidogrel 75 mg oral tablet: 1 tab(s) orally once a day  Colace 100 mg oral capsule: 1 cap(s) orally once a day  cyanocobalamin 100 mcg oral tablet: 1 tab(s) orally once a day  divalproex sodium 250 mg oral tablet, extended release: 2 tab(s) orally once a day  divalproex sodium 500 mg oral tablet, extended release: 1 tab(s) orally once a day  enoxaparin: 0.7 milliliter(s) injectable every 12 hours  Flonase 50 mcg/inh nasal spray: 1 spray(s) nasal once a day  HumaLOG KwikPen 100 units/mL injectable solution:   HumaLOG KwikPen 100 units/mL injectable solution:   loratadine 10 mg oral tablet: 1 tab(s) orally once a day  LORazepam 0.5 mg oral tablet: 1 tab(s) orally 4 times a day  LORazepam 0.5 mg oral tablet: 1 tab(s) orally 3 times a day  Macrobid 100 mg oral capsule: 1 cap(s) orally 2 times a day  Melatonin 5 mg oral capsule: 1 cap(s) orally once a day (at bedtime)  Melatonin 5 mg oral tablet: 1 tab(s) orally once a day (at bedtime)  Metamucil: 1 packet(s) orally once a day  metFORMIN 1000 mg oral tablet: 1 tab(s) orally 2 times a day (with meals)  metFORMIN 1000 mg oral tablet: 1 tab(s) orally 2 times a day  Multiple Vitamins oral tablet: 1 tab(s) orally once a day  Plavix 75 mg oral tablet: 1 tab(s) orally once a day  risperiDONE 0.25 mg oral tablet: 1 tab(s) orally once a day (at bedtime)  risperiDONE 0.25 mg oral tablet: 1 tab(s) orally once a day  Senna 8.6 mg oral tablet: 1 tab(s) orally once a day (at bedtime)  SITagliptin 100 mg oral tablet: 1 tab(s) orally once a day  SITagliptin 100 mg oral tablet: 1 tab(s) orally once a day  thiamine 100 mg oral tablet: 1 tab(s) orally once a day  traZODone 100 mg oral tablet: 2 tab(s) orally once a day (at bedtime)  traZODone 100 mg oral tablet: 2 tab(s) orally once a day (at bedtime)  Vitamin D3 5000 intl units oral capsule: 1 cap(s) orally once a day  zolpidem 10 mg oral tablet: 1 tab(s) orally once a day (at bedtime) acetaminophen 325 mg oral tablet: 2 tab(s) orally every 6 hours, As needed, Mild Pain (1 - 3)  amLODIPine 10 mg oral tablet: 1 tab(s) orally once a day  ascorbic acid 500 mg oral tablet: 1 tab(s) orally once a day  aspirin 81 mg oral tablet: 1 tab(s) orally once a day  atorvastatin 80 mg oral tablet: 1 tab(s) orally once a day (at bedtime)  cholecalciferol oral tablet: 1000 unit(s) orally once a day  Colace 100 mg oral capsule: 1 cap(s) orally once a day  cyanocobalamin 100 mcg oral tablet: 1 tab(s) orally once a day  divalproex sodium 250 mg oral tablet, extended release: 2 tab(s) orally once a day  enoxaparin: 0.7 milliliter(s) injectable every 12 hours  Flonase 50 mcg/inh nasal spray: 1 spray(s) nasal once a day  HumaLOG KwikPen 100 units/mL injectable solution:   loratadine 10 mg oral tablet: 1 tab(s) orally once a day  LORazepam 0.5 mg oral tablet: 1 tab(s) orally 4 times a day  Melatonin 5 mg oral capsule: 1 cap(s) orally once a day (at bedtime)  Metamucil: 1 packet(s) orally once a day  metFORMIN 1000 mg oral tablet: 1 tab(s) orally 2 times a day (with meals)  metoprolol tartrate 50 mg oral tablet: 1 tab(s) orally 2 times a day  Multiple Vitamins oral tablet: 1 tab(s) orally once a day  risperiDONE 0.25 mg oral tablet: 1 tab(s) orally once a day  Senna 8.6 mg oral tablet: 1 tab(s) orally once a day (at bedtime)  SITagliptin 100 mg oral tablet: 1 tab(s) orally once a day  thiamine 100 mg oral tablet: 1 tab(s) orally once a day  traZODone 100 mg oral tablet: 2 tab(s) orally once a day (at bedtime)  zolpidem 10 mg oral tablet: 1 tab(s) orally once a day (at bedtime)

## 2019-11-14 ENCOUNTER — TRANSCRIPTION ENCOUNTER (OUTPATIENT)
Age: 69
End: 2019-11-14

## 2019-11-14 VITALS
HEART RATE: 105 BPM | DIASTOLIC BLOOD PRESSURE: 92 MMHG | SYSTOLIC BLOOD PRESSURE: 158 MMHG | TEMPERATURE: 98 F | OXYGEN SATURATION: 95 % | RESPIRATION RATE: 16 BRPM

## 2019-11-14 PROCEDURE — 87040 BLOOD CULTURE FOR BACTERIA: CPT

## 2019-11-14 PROCEDURE — 82435 ASSAY OF BLOOD CHLORIDE: CPT

## 2019-11-14 PROCEDURE — 71045 X-RAY EXAM CHEST 1 VIEW: CPT

## 2019-11-14 PROCEDURE — 81001 URINALYSIS AUTO W/SCOPE: CPT

## 2019-11-14 PROCEDURE — 82947 ASSAY GLUCOSE BLOOD QUANT: CPT

## 2019-11-14 PROCEDURE — 70450 CT HEAD/BRAIN W/O DYE: CPT

## 2019-11-14 PROCEDURE — 84100 ASSAY OF PHOSPHORUS: CPT

## 2019-11-14 PROCEDURE — 82607 VITAMIN B-12: CPT

## 2019-11-14 PROCEDURE — 83690 ASSAY OF LIPASE: CPT

## 2019-11-14 PROCEDURE — 83605 ASSAY OF LACTIC ACID: CPT

## 2019-11-14 PROCEDURE — 85730 THROMBOPLASTIN TIME PARTIAL: CPT

## 2019-11-14 PROCEDURE — 82962 GLUCOSE BLOOD TEST: CPT

## 2019-11-14 PROCEDURE — 72125 CT NECK SPINE W/O DYE: CPT

## 2019-11-14 PROCEDURE — 80053 COMPREHEN METABOLIC PANEL: CPT

## 2019-11-14 PROCEDURE — 80307 DRUG TEST PRSMV CHEM ANLYZR: CPT

## 2019-11-14 PROCEDURE — 84132 ASSAY OF SERUM POTASSIUM: CPT

## 2019-11-14 PROCEDURE — 86900 BLOOD TYPING SEROLOGIC ABO: CPT

## 2019-11-14 PROCEDURE — 76705 ECHO EXAM OF ABDOMEN: CPT

## 2019-11-14 PROCEDURE — 82803 BLOOD GASES ANY COMBINATION: CPT

## 2019-11-14 PROCEDURE — 74177 CT ABD & PELVIS W/CONTRAST: CPT

## 2019-11-14 PROCEDURE — 84295 ASSAY OF SERUM SODIUM: CPT

## 2019-11-14 PROCEDURE — 71260 CT THORAX DX C+: CPT

## 2019-11-14 PROCEDURE — 82330 ASSAY OF CALCIUM: CPT

## 2019-11-14 PROCEDURE — 83540 ASSAY OF IRON: CPT

## 2019-11-14 PROCEDURE — 85014 HEMATOCRIT: CPT

## 2019-11-14 PROCEDURE — 90715 TDAP VACCINE 7 YRS/> IM: CPT

## 2019-11-14 PROCEDURE — 96375 TX/PRO/DX INJ NEW DRUG ADDON: CPT | Mod: XU

## 2019-11-14 PROCEDURE — 80164 ASSAY DIPROPYLACETIC ACD TOT: CPT

## 2019-11-14 PROCEDURE — 36430 TRANSFUSION BLD/BLD COMPNT: CPT

## 2019-11-14 PROCEDURE — 96374 THER/PROPH/DIAG INJ IV PUSH: CPT | Mod: XU

## 2019-11-14 PROCEDURE — 73706 CT ANGIO LWR EXTR W/O&W/DYE: CPT

## 2019-11-14 PROCEDURE — 86901 BLOOD TYPING SEROLOGIC RH(D): CPT

## 2019-11-14 PROCEDURE — 80048 BASIC METABOLIC PNL TOTAL CA: CPT

## 2019-11-14 PROCEDURE — 97161 PT EVAL LOW COMPLEX 20 MIN: CPT

## 2019-11-14 PROCEDURE — 51701 INSERT BLADDER CATHETER: CPT

## 2019-11-14 PROCEDURE — 82550 ASSAY OF CK (CPK): CPT

## 2019-11-14 PROCEDURE — 83735 ASSAY OF MAGNESIUM: CPT

## 2019-11-14 PROCEDURE — 86850 RBC ANTIBODY SCREEN: CPT

## 2019-11-14 PROCEDURE — 72170 X-RAY EXAM OF PELVIS: CPT

## 2019-11-14 PROCEDURE — 93005 ELECTROCARDIOGRAM TRACING: CPT | Mod: XU

## 2019-11-14 PROCEDURE — 82140 ASSAY OF AMMONIA: CPT

## 2019-11-14 PROCEDURE — 83550 IRON BINDING TEST: CPT

## 2019-11-14 PROCEDURE — 85027 COMPLETE CBC AUTOMATED: CPT

## 2019-11-14 PROCEDURE — 73552 X-RAY EXAM OF FEMUR 2/>: CPT

## 2019-11-14 PROCEDURE — 99291 CRITICAL CARE FIRST HOUR: CPT | Mod: 25

## 2019-11-14 PROCEDURE — 85610 PROTHROMBIN TIME: CPT

## 2019-11-14 PROCEDURE — 73700 CT LOWER EXTREMITY W/O DYE: CPT

## 2019-11-14 PROCEDURE — 99238 HOSP IP/OBS DSCHRG MGMT 30/<: CPT

## 2019-11-14 PROCEDURE — 82746 ASSAY OF FOLIC ACID SERUM: CPT

## 2019-11-14 RX ORDER — METOPROLOL TARTRATE 50 MG
1 TABLET ORAL
Qty: 0 | Refills: 0 | DISCHARGE
Start: 2019-11-14

## 2019-11-14 RX ORDER — INSULIN LISPRO 100/ML
0 VIAL (ML) SUBCUTANEOUS
Qty: 0 | Refills: 0 | DISCHARGE

## 2019-11-14 RX ORDER — ACETAMINOPHEN 500 MG
2 TABLET ORAL
Qty: 0 | Refills: 0 | DISCHARGE
Start: 2019-11-14

## 2019-11-14 RX ORDER — ASPIRIN/CALCIUM CARB/MAGNESIUM 324 MG
2 TABLET ORAL
Qty: 0 | Refills: 0 | DISCHARGE

## 2019-11-14 RX ORDER — AMLODIPINE BESYLATE 2.5 MG/1
1 TABLET ORAL
Qty: 0 | Refills: 0 | DISCHARGE
Start: 2019-11-14

## 2019-11-14 RX ORDER — RISPERIDONE 4 MG/1
1 TABLET ORAL
Qty: 0 | Refills: 0 | DISCHARGE

## 2019-11-14 RX ORDER — CHOLECALCIFEROL (VITAMIN D3) 125 MCG
1 CAPSULE ORAL
Qty: 0 | Refills: 0 | DISCHARGE

## 2019-11-14 RX ORDER — CLOPIDOGREL BISULFATE 75 MG/1
1 TABLET, FILM COATED ORAL
Qty: 0 | Refills: 0 | DISCHARGE

## 2019-11-14 RX ORDER — SITAGLIPTIN 50 MG/1
1 TABLET, FILM COATED ORAL
Qty: 0 | Refills: 0 | DISCHARGE

## 2019-11-14 RX ORDER — LANOLIN ALCOHOL/MO/W.PET/CERES
1 CREAM (GRAM) TOPICAL
Qty: 0 | Refills: 0 | DISCHARGE

## 2019-11-14 RX ORDER — NITROFURANTOIN MACROCRYSTAL 50 MG
1 CAPSULE ORAL
Qty: 0 | Refills: 0 | DISCHARGE

## 2019-11-14 RX ORDER — ATORVASTATIN CALCIUM 80 MG/1
1 TABLET, FILM COATED ORAL
Qty: 0 | Refills: 0 | DISCHARGE

## 2019-11-14 RX ORDER — METFORMIN HYDROCHLORIDE 850 MG/1
1 TABLET ORAL
Qty: 0 | Refills: 0 | DISCHARGE

## 2019-11-14 RX ORDER — DIVALPROEX SODIUM 500 MG/1
1 TABLET, DELAYED RELEASE ORAL
Qty: 0 | Refills: 0 | DISCHARGE

## 2019-11-14 RX ORDER — TRAZODONE HCL 50 MG
2 TABLET ORAL
Qty: 0 | Refills: 0 | DISCHARGE

## 2019-11-14 RX ADMIN — Medication 81 MILLIGRAM(S): at 13:19

## 2019-11-14 RX ADMIN — DIVALPROEX SODIUM 500 MILLIGRAM(S): 500 TABLET, DELAYED RELEASE ORAL at 13:19

## 2019-11-14 RX ADMIN — ENOXAPARIN SODIUM 70 MILLIGRAM(S): 100 INJECTION SUBCUTANEOUS at 09:31

## 2019-11-14 RX ADMIN — RISPERIDONE 0.25 MILLIGRAM(S): 4 TABLET ORAL at 13:19

## 2019-11-14 RX ADMIN — Medication 50 MILLIGRAM(S): at 09:31

## 2019-11-14 RX ADMIN — AMLODIPINE BESYLATE 10 MILLIGRAM(S): 2.5 TABLET ORAL at 05:16

## 2019-11-14 NOTE — PROGRESS NOTE ADULT - ASSESSMENT
69F PMHx CVA on ASA, plavix, and Tlovenox BIBEMS from rehab facility after a suspected fall, initially brought to the main ED area but Level 2 called when she was hypoxic.  Patient found to have a LLE hematoma.    Plan:  - Resume t-lovenox and ASA   - Discharge to rehab   - Follow up with hematology, vascular neurology and trauma surgery    ACS & Trauma, [id number]

## 2019-11-14 NOTE — PROGRESS NOTE ADULT - ASSESSMENT
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 CTA of RLE demonstrating intramuscular hematoma, patient transferred to SICU for hemorrhage watch now back to floor and plan for discharge.    -Record of previous hospitalization is needed to clarify if she had PE as her brother mentions to make reccomendations  -Patient is being discharged on ASA and T.Lovenox today    Opal Berkowitz PGY4  Heme/Onc fellow

## 2019-11-14 NOTE — PROGRESS NOTE ADULT - SUBJECTIVE AND OBJECTIVE BOX
INTERVAL EVENTS:  Patient was seen and examined at bedside , no overnight event, team was notified about the fact that there is no record about possible PE available yet, they called the brother and team plan is discharging patient today.    REVIEW OF SYSTEMS:  CONSTITUTIONAL: No weakness, fevers or chills  EYES/ENT: No visual changes, no throat pain   RESPIRATORY: No cough, wheezing, hemoptysis; No shortness of breath  CARDIOVASCULAR: No chest pain or palpitations  GASTROINTESTINAL: No abdominal pain, nausea, vomiting, or hematemesis; No diarrhea or constipation. No melena or hematochezia.  GENITOURINARY: No dysuria, frequency or hematuria  MUSCULOSKELETAL: No joint pain.  NEUROLOGICAL: No dizziness, numbness, or weakness  SKIN: No itching, burning, rashes, or lesions   All other review of systems is negative unless indicated above.    Vital Signs Last 24 Hrs  T(C): 36.9 (14 Nov 2019 11:44), Max: 37.3 (14 Nov 2019 05:15)  T(F): 98.5 (14 Nov 2019 11:44), Max: 99.1 (14 Nov 2019 05:15)  HR: 105 (14 Nov 2019 11:44) (98 - 112)  BP: 158/92 (14 Nov 2019 11:44) (152/85 - 171/77)  BP(mean): --  RR: 16 (14 Nov 2019 11:44) (16 - 18)  SpO2: 95% (14 Nov 2019 11:44) (94% - 98%)      PHYSICAL EXAM:   Gen: NAD, lying comfortably in bed  HEENT: Head lac, not actively bleeding  RESP: nonlabored breathing  ABDOMEN: soft, non-distended, mildly tender  Extremities: R thigh mildly indurated, compartments soft                          9.0    8.23  )-----------( 392      ( 13 Nov 2019 06:12 )             27.8     11-13    139  |  104  |  10  ----------------------------<  155<H>  3.8   |  24  |  0.45<L>    Ca    9.7      13 Nov 2019 06:12  Phos  3.5     11-13  Mg     1.9     11-13      PT/INR - ( 13 Nov 2019 06:12 )   PT: 10.8 sec;   INR: 0.94 ratio         PTT - ( 13 Nov 2019 06:12 )  PTT:32.8 sec    MEDICATIONS  (STANDING):  amLODIPine   Tablet 10 milliGRAM(s) Oral daily  aspirin  chewable 81 milliGRAM(s) Oral daily  atorvastatin 80 milliGRAM(s) Oral at bedtime  diVALproex  milliGRAM(s) Oral daily  enoxaparin Injectable 70 milliGRAM(s) SubCutaneous every 12 hours  influenza   Vaccine 0.5 milliLiter(s) IntraMuscular once  metoprolol tartrate 50 milliGRAM(s) Oral two times a day  risperiDONE  Disintegrating Tablet 0.25 milliGRAM(s) Oral daily  traZODone 200 milliGRAM(s) Oral at bedtime

## 2019-11-14 NOTE — CHART NOTE - NSCHARTNOTEFT_GEN_A_CORE
Spoke to hematology fellow, Dr. Joseph who recommended discharging patient on ASA/T.Lovenox for recent PE found a few weeks ago on CT scan at [facility].  I spoke to patients brother (Madhu) who confirms patient was diagnosed with PE at [facility] "a few weeks ago."  Patients brother states he brought records in however copies were never made, and would not be able to provide them today.  Patient is arranged to be discharged to [facility] at 1 PM.  Brother was notified that patient will be discharged on ASA/T.Lovenox and will arrange for outpatient follow up.    ACS [phone] Spoke to hematology fellow, Dr. Joseph who recommended ASA/T.Lovenox for recent PE found a few weeks ago on CT scan at [facility].  I spoke to patients brother (Madhu) who confirms patient was diagnosed with PE at [facility] "a few weeks ago."  Patients brother states he brought records in however copies were never made, and would not be able to provide them today.  Patient is arranged to be discharged to [facility] at 1 PM.  Brother was notified that patient will be discharged on ASA/T.Lovenox and will arrange for outpatient follow up.    ACS [phone]

## 2019-11-14 NOTE — DISCHARGE NOTE NURSING/CASE MANAGEMENT/SOCIAL WORK - NSDCPELOVENOX_GEN_ALL_CORE
Enoxaparin/Lovenox - Dietary Advice/Enoxaparin/Lovenox - Follow up monitoring/Enoxaparin/Lovenox - Compliance/Enoxaparin/Lovenox - Potential for adverse drug reactions and interactions

## 2019-11-14 NOTE — DISCHARGE NOTE NURSING/CASE MANAGEMENT/SOCIAL WORK - PATIENT PORTAL LINK FT
You can access the FollowMyHealth Patient Portal offered by [facility] by registering at the following website: [email or web address]. By joining [employer]’s FollowMyHealth portal, you will also be able to view your health information using other applications (apps) compatible with our system.

## 2019-11-14 NOTE — PROGRESS NOTE ADULT - SUBJECTIVE AND OBJECTIVE BOX
SUBJECTIVE: Pt seen and examined at bedside with chief resident.  She reports that she has no pain or discomfort  Yesterday, due to tachycardia a UA, EKG were obtained. UA negative for UTI findings, EKG showed sinus tachycardia. Tachycardia likely due to agitation     MEDICATIONS  (STANDING):  amLODIPine   Tablet 10 milliGRAM(s) Oral daily  aspirin  chewable 81 milliGRAM(s) Oral daily  atorvastatin 80 milliGRAM(s) Oral at bedtime  diVALproex  milliGRAM(s) Oral daily  enoxaparin Injectable 70 milliGRAM(s) SubCutaneous every 12 hours  influenza   Vaccine 0.5 milliLiter(s) IntraMuscular once  metoprolol tartrate 50 milliGRAM(s) Oral two times a day  risperiDONE  Disintegrating Tablet 0.25 milliGRAM(s) Oral daily  traZODone 200 milliGRAM(s) Oral at bedtime    MEDICATIONS  (PRN):  acetaminophen   Tablet .. 650 milliGRAM(s) Oral every 6 hours PRN Mild Pain (1 - 3)      OBJECTIVE:    Vital Signs Last 24 Hrs  T(C): 36.9 (2019 11:44), Max: 37.3 (2019 05:15)  T(F): 98.5 (2019 11:44), Max: 99.1 (2019 05:15)  HR: 105 (2019 11:44) (98 - 112)  BP: 158/92 (2019 11:44) (152/85 - 171/77)  BP(mean): --  RR: 16 (2019 11:44) (16 - 18)  SpO2: 95% (2019 11:44) (94% - 98%)    Gen: NAD, lying comfortably in bed  HEENT: Head lac, healing, not actively bleeding  RESP: nonlabored breathing  ABDOMEN: soft, non-distended, mildly tender  Extremities: R thigh mildly indurated, compartments soft    I&O's Summary    2019 07:  -  2019 07:00  --------------------------------------------------------  IN: 830 mL / OUT: 100 mL / NET: 730 mL    2019 07:01  -  2019 13:14  --------------------------------------------------------  IN: 240 mL / OUT: 100 mL / NET: 140 mL      I&O's Detail    2019 07:  -  2019 07:00  --------------------------------------------------------  IN:    Oral Fluid: 830 mL  Total IN: 830 mL    OUT:    Voided: 100 mL  Total OUT: 100 mL    Total NET: 730 mL      2019 07:  -  2019 13:14  --------------------------------------------------------  IN:    Oral Fluid: 240 mL  Total IN: 240 mL    OUT:    Voided: 100 mL  Total OUT: 100 mL    Total NET: 140 mL            LABS:                        9.0    8.23  )-----------( 392      ( 2019 06:12 )             27.8     -13    139  |  104  |  10  ----------------------------<  155<H>  3.8   |  24  |  0.45<L>    Ca    9.7      2019 06:12  Phos  3.5     11-13  Mg     1.9     11-13      PT/INR - ( 2019 06:12 )   PT: 10.8 sec;   INR: 0.94 ratio         PTT - ( 2019 06:12 )  PTT:32.8 sec  Urinalysis Basic - ( 2019 13:30 )    Color: Yellow / Appearance: Slightly Turbid / S.031 / pH: x  Gluc: x / Ketone: Trace  / Bili: Small / Urobili: 8 mg/dL   Blood: x / Protein: 300 mg/dL Test repeated. / Nitrite: Negative   Leuk Esterase: Negative / RBC: 9 /hpf / WBC 10 /HPF   Sq Epi: x / Non Sq Epi: 2 /hpf / Bacteria: Negative        RADIOLOGY & ADDITIONAL STUDIES:

## 2019-11-15 LAB
CULTURE RESULTS: SIGNIFICANT CHANGE UP
CULTURE RESULTS: SIGNIFICANT CHANGE UP
SPECIMEN SOURCE: SIGNIFICANT CHANGE UP
SPECIMEN SOURCE: SIGNIFICANT CHANGE UP

## 2020-06-05 NOTE — ED PROVIDER NOTE - NS ED ROS FT
Rosaura Najera presents to the clinic today for management of her anticoagulation therapy in treatment of her Afib, Mechanical MVR. Target INR is 2.0-3.0. Her INR today was supratherapeutic at 4.2 on 23 mg of Warfarin weekly. Her previous INR was 2.8 on 5/26/20. The patient reports medication changes since the last visit, she has been taking approximately 1000mg of Acetaminophen daily; taking more than 2000mg/day can increase INR/LISA. She  denies diet changes since the last visit. She was not able to ambulate to office without assistive device, she was in a wheelchair. Rosaura will HOLD her Warfarin dose this evening-Friday 6/5/20, then take 3mg Saturday 6/6/20, 3mg Sunday 6/7/20; totaling 6mg in 3 days with 1 day of holding. This is a 17.4% reduction in dosing per protocol. She was scheduled to return to the clinic in 3 days on Monday 6/8/20 for INR fingerstick. Onsite billing provider is TAI Jordan.   Due to INR today being above range. Cause unknown. Patient does deny  bleeding problems. Safety precautions reviewed.      Call your physician or seek medical care immediately if you notice any of the following symptoms of a bleed:   Red, dark, coffee or cola colored urine  Red or tar like stools  Excessive bleeding from gums or nose  Vomiting coffee colored or bright red material  Coughing up red tinged sputum  Severe or unprovoked pain (ex: severe HA or Abd pain)  Sudden, spontaneous bruising for no reason  Excessive menstrual bleeding  A cut that will not stop bleeding within 10-15 mins  Symptoms associated with abnormal bleeding/high INR reviewed.  Rosaura encouraged to avoid activities that may result in a serious fall or injury and verbalizes understanding: Yes     Unable to obtain due to mental status

## 2020-07-25 NOTE — ED ADULT NURSE NOTE - NEURO ASSESSMENT
[facility]  Internal Medicine Residency Program  Progress Note - House Team 2    Patient:  Naeem Harp 65 y.o. male MRN: [id number]     Date of Service: 7/25/2020     CC: Bilateral knee pain, low back pain, bilateral hip pain, N/T  Overnight events: None  Hospital Day: 5    Subjective     He was able to work with PT yesterday but his pain worsened afterwards. Patient reports severe bilateral knee pain this morning and now his left is worse than the right. He also notes discoloration to the right 5th toe and intermittent burning and tingling. He has not had a BM for the past few days. Denies CP, SOB, abd pain, and n/v.    Objective     Physical Exam:  Vitals: BP (!) 159/79   Pulse 85   Temp 97.6 °F (36.4 °C) (Temporal)   Resp 16   Ht 5' 11\" (1.803 m)   Wt 218 lb 4.1 oz (99 kg)   SpO2 93%   BMI 30.44 kg/m²     I & O - 24hr:     Intake/Output Summary (Last 24 hours) at 7/25/2020 1413  Last data filed at 7/25/2020 0530  Gross per 24 hour   Intake --   Output 850 ml   Net -850 ml     · General Appearance: alert, appears stated age, cooperative. Appears uncomfortable. · HEENT:  Head: Normocephalic, no lesions, without obvious abnormality. · Neck: no adenopathy, supple, symmetrical, trachea midline and thyroid not enlarged, symmetric, no tenderness/mass/nodules  · Lung: clear to auscultation bilaterally  · Heart: regular rate and rhythm, S1, S2 normal, no murmur, click, rub or gallop  · Abdomen: Mildly tender to palpation diffusely. · Extremities:  Bilaterally the knees are exquisitely tender to palpation. ACE wraps over both thighs, knees, and legs. Sensation is equal and intact. Decreased range of motion secondary to pain. Able to dorsiflex and plantar flex both feet 3/5. The right fifth toe appears darker in color but there is good capillary refill and it is not tender to palpation.   · Neurologic: Mental status: Alert, oriented, thought content appropriate    Pertinent Labs & Imaging Studies     CBC:   Lab Results   Component Value Date    WBC 19.5 07/25/2020    RBC 3.66 07/25/2020    HGB 11.1 07/25/2020    HCT 32.8 07/25/2020    MCV 89.6 07/25/2020    MCH 30.3 07/25/2020    MCHC 33.8 07/25/2020    RDW 12.9 07/25/2020     07/25/2020    MPV 9.3 07/25/2020     CMP:    Lab Results   Component Value Date     07/25/2020    K 3.6 07/25/2020    K 4.1 07/19/2020    CL 96 07/25/2020    CO2 25 07/25/2020    BUN 23 07/25/2020    CREATININE 0.7 07/25/2020    GFRAA >60 07/25/2020    LABGLOM >60 07/25/2020    GLUCOSE 83 07/25/2020    PROT 7.1 07/19/2020    LABALBU 4.4 07/19/2020    CALCIUM 8.9 07/25/2020    BILITOT 0.5 07/19/2020    ALKPHOS 74 07/19/2020    AST 24 07/19/2020    ALT 22 07/19/2020 7/22/2020 09:22   CRP 41.4 (H)      7/22/2020 09:22   Sed Rate 90 (H)      7/21/2020 01:22   Body Fluid Culture (Knee) Strep agalactiae (Beta Strep Group B) (A)      7/22/2020 09:22   Hemoglobin A1C 10.1 (H)      7/22/2020 17:00   Human Rhinovirus/Enterovirus by PCR DETECTED (A)     Order Date:  7/23/2020 11:15 AM    EXAM: CT LUMBAR SPINE W WO CONTRAST    INDICATION: Severe back pain. Recent septic arthritis. COMPARISON: 20 January 2015    FINDINGS:    No acute fracture or dislocation is seen. There is moderate spinal    stenosis at L3-4 L4-5 relate disc bulging and facet arthropathy. There    are no endplate erosions to suggest infection.         Postcontrast there is no evidence of any abnormal enhancement to    suggest infection or otherwise. Impression    Moderate spinal canal stenosis related to disc bulging and facet    arthropathy at L3-4 and L4-5. No evidence of spinal infection. Resident's Assessment and Plan     Eamon Leahy is a 65 y.o. male with a PMHx of type 2 DM, peripheral neuropathy, OA, lumbar stenosis, and HLD who presented with bilateral lower extremity, hip, and low back pain. He has chronic pain in these areas but it acutely worsened after a fall on 7/18. day at home. Has peripheral neuropathy. BG well-controlled today 80 to mid 100's.  Continue Lantus to 35u QHS   HDSS   Monitor glucose    5. Rhinovirus infection/colonization    6. Opioid-induced constipation  · Senokot BID and scheduled PEG daily    7. HLD  Atorvastatin 20 mg daily    8. Bipolar disorder  Lamictal 25 mg daily, Effexor 75 mg daily    9. GERD  Protonix 40 mg daily     10.  Bilateral knee OA    PT/OT evaluation: PT 8/24 / OT 14/24  DVT prophylaxis/GI prophylaxis: ASA and Lovenox/Protonix  Disposition: Continue current care    Maris Loredo MD,  PGY-1  Attending Physician: Dr. Mauricio Moreira - - -

## 2020-07-30 NOTE — PATIENT PROFILE ADULT - JOB HELP
INTERVENTIONAL RADIOLOGY    Attempted to see patient, however off unit in [facility].     40 year old male with post-operative fluid collection. See previous progress notes for more detailed HPI.      -- 7/27/20: s/p 10Fr RLQ abdominal drain placement   -- Output: 100cc/last 24hrs   -- Fluid cx (7/27) pending - NGTD   -- ID following - on meropenem, micafungin   -- Maintain to RISHABH suction   -- Flush IR RISHABH drain with 5cc saline BID. Record output Q8h, even if 0cc. Dressing change daily and PRN.   -- Recommend repeat imaging when outputs <20cc/24 hrs. Timing of imaging per Transplant team.  -- Ok to discharge from IR standpoint when medically cleared    -- Nursing staff to complete drain teaching/education prior to discharge   -- Discharge instructions placed in AVS   -- Flush prescription sent to [facility] pharmacy   -- IR will continue to follow      ROYA Mckenzie  Interventional Radiology  Pager: [phone] (M-F [phone])    Please call [phone] or contact the on-call IR provider for urgent issues outside of above hours.    no

## 2020-09-12 ENCOUNTER — INPATIENT (INPATIENT)
Facility: HOSPITAL | Age: 70
LOS: 8 days | Discharge: NURSING, REHAB OR LONG-TERM CARE | DRG: 811 | End: 2020-09-21
Attending: STUDENT IN AN ORGANIZED HEALTH CARE EDUCATION/TRAINING PROGRAM | Admitting: STUDENT IN AN ORGANIZED HEALTH CARE EDUCATION/TRAINING PROGRAM
Payer: MEDICARE

## 2020-09-12 VITALS
WEIGHT: 126.1 LBS | OXYGEN SATURATION: 100 % | RESPIRATION RATE: 16 BRPM | HEART RATE: 125 BPM | SYSTOLIC BLOOD PRESSURE: 108 MMHG | TEMPERATURE: 99 F | HEIGHT: 62 IN | DIASTOLIC BLOOD PRESSURE: 76 MMHG

## 2020-09-12 DIAGNOSIS — D64.9 ANEMIA, UNSPECIFIED: ICD-10-CM

## 2020-09-12 DIAGNOSIS — S09.90XA UNSPECIFIED INJURY OF HEAD, INITIAL ENCOUNTER: ICD-10-CM

## 2020-09-12 DIAGNOSIS — I10 ESSENTIAL (PRIMARY) HYPERTENSION: ICD-10-CM

## 2020-09-12 DIAGNOSIS — Z71.89 OTHER SPECIFIED COUNSELING: ICD-10-CM

## 2020-09-12 DIAGNOSIS — R63.4 ABNORMAL WEIGHT LOSS: ICD-10-CM

## 2020-09-12 DIAGNOSIS — F31.9 BIPOLAR DISORDER, UNSPECIFIED: ICD-10-CM
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
## 2020-09-12 DIAGNOSIS — E11.9 TYPE 2 DIABETES MELLITUS WITHOUT COMPLICATIONS: ICD-10-CM

## 2020-09-12 DIAGNOSIS — E87.2 ACIDOSIS: ICD-10-CM

## 2020-09-12 DIAGNOSIS — I26.99 OTHER PULMONARY EMBOLISM WITHOUT ACUTE COR PULMONALE: ICD-10-CM

## 2020-09-12 LAB
ALBUMIN SERPL ELPH-MCNC: 2.5 G/DL — LOW (ref 3.3–5)
ALP SERPL-CCNC: 38 U/L — LOW (ref 40–120)
ALT FLD-CCNC: 18 U/L — SIGNIFICANT CHANGE UP (ref 10–45)
ANION GAP SERPL CALC-SCNC: 12 MMOL/L — SIGNIFICANT CHANGE UP (ref 5–17)
APPEARANCE UR: CLEAR — SIGNIFICANT CHANGE UP
APTT BLD: 31.8 SEC — SIGNIFICANT CHANGE UP (ref 27.5–35.5)
AST SERPL-CCNC: 19 U/L — SIGNIFICANT CHANGE UP (ref 10–40)
BACTERIA # UR AUTO: ABNORMAL /HPF
BASOPHILS # BLD AUTO: 0.06 K/UL — SIGNIFICANT CHANGE UP (ref 0–0.2)
BASOPHILS NFR BLD AUTO: 0.5 % — SIGNIFICANT CHANGE UP (ref 0–2)
BILIRUB SERPL-MCNC: 1 MG/DL — SIGNIFICANT CHANGE UP (ref 0.2–1.2)
BILIRUB UR-MCNC: ABNORMAL
BUN SERPL-MCNC: 19 MG/DL — SIGNIFICANT CHANGE UP (ref 7–23)
CALCIUM SERPL-MCNC: 9.1 MG/DL — SIGNIFICANT CHANGE UP (ref 8.4–10.5)
CHLORIDE SERPL-SCNC: 106 MMOL/L — SIGNIFICANT CHANGE UP (ref 96–108)
CK SERPL-CCNC: 29 U/L — SIGNIFICANT CHANGE UP (ref 25–170)
CO2 SERPL-SCNC: 22 MMOL/L — SIGNIFICANT CHANGE UP (ref 22–31)
COLOR SPEC: YELLOW — SIGNIFICANT CHANGE UP
COMMENT - URINE: SIGNIFICANT CHANGE UP
CREAT SERPL-MCNC: 0.89 MG/DL — SIGNIFICANT CHANGE UP (ref 0.5–1.3)
D DIMER BLD IA.RAPID-MCNC: <150 NG/ML DDU — SIGNIFICANT CHANGE UP
DIFF PNL FLD: ABNORMAL
EOSINOPHIL # BLD AUTO: 0 K/UL — SIGNIFICANT CHANGE UP (ref 0–0.5)
EOSINOPHIL NFR BLD AUTO: 0 % — SIGNIFICANT CHANGE UP (ref 0–6)
EPI CELLS # UR: SIGNIFICANT CHANGE UP
GLUCOSE BLDC GLUCOMTR-MCNC: 121 MG/DL — HIGH (ref 70–99)
GLUCOSE SERPL-MCNC: 182 MG/DL — HIGH (ref 70–99)
GLUCOSE UR QL: NEGATIVE — SIGNIFICANT CHANGE UP
HCT VFR BLD CALC: 26 % — LOW (ref 34.5–45)
HGB BLD-MCNC: 8.6 G/DL — LOW (ref 11.5–15.5)
HYALINE CASTS # UR AUTO: ABNORMAL (ref 0–7)
IMM GRANULOCYTES NFR BLD AUTO: 0.9 % — SIGNIFICANT CHANGE UP (ref 0–1.5)
INR BLD: 1.03 RATIO — SIGNIFICANT CHANGE UP (ref 0.88–1.16)
KETONES UR-MCNC: ABNORMAL
LACTATE SERPL-SCNC: 1.5 MMOL/L — SIGNIFICANT CHANGE UP (ref 0.7–2)
LACTATE SERPL-SCNC: 1.9 MMOL/L — SIGNIFICANT CHANGE UP (ref 0.7–2)
LACTATE SERPL-SCNC: 4.1 MMOL/L — CRITICAL HIGH (ref 0.7–2)
LACTATE SERPL-SCNC: 4.2 MMOL/L — CRITICAL HIGH (ref 0.7–2)
LEUKOCYTE ESTERASE UR-ACNC: ABNORMAL
LYMPHOCYTES # BLD AUTO: 17.7 % — SIGNIFICANT CHANGE UP (ref 13–44)
LYMPHOCYTES # BLD AUTO: 2.2 K/UL — SIGNIFICANT CHANGE UP (ref 1–3.3)
MCHC RBC-ENTMCNC: 33.1 GM/DL — SIGNIFICANT CHANGE UP (ref 32–36)
MCHC RBC-ENTMCNC: 34.4 PG — HIGH (ref 27–34)
MCV RBC AUTO: 104 FL — HIGH (ref 80–100)
MONOCYTES # BLD AUTO: 0.87 K/UL — SIGNIFICANT CHANGE UP (ref 0–0.9)
MONOCYTES NFR BLD AUTO: 7 % — SIGNIFICANT CHANGE UP (ref 2–14)
NEUTROPHILS # BLD AUTO: 9.2 K/UL — HIGH (ref 1.8–7.4)
NEUTROPHILS NFR BLD AUTO: 73.9 % — SIGNIFICANT CHANGE UP (ref 43–77)
NITRITE UR-MCNC: NEGATIVE — SIGNIFICANT CHANGE UP
NRBC # BLD: 0 /100 WBCS — SIGNIFICANT CHANGE UP (ref 0–0)
OB PNL STL: NEGATIVE — SIGNIFICANT CHANGE UP
PH UR: 5 — SIGNIFICANT CHANGE UP (ref 5–8)
PLATELET # BLD AUTO: 239 K/UL — SIGNIFICANT CHANGE UP (ref 150–400)
POTASSIUM SERPL-MCNC: 4.8 MMOL/L — SIGNIFICANT CHANGE UP (ref 3.5–5.3)
POTASSIUM SERPL-SCNC: 4.8 MMOL/L — SIGNIFICANT CHANGE UP (ref 3.5–5.3)
PROT SERPL-MCNC: 5.9 G/DL — LOW (ref 6–8.3)
PROT UR-MCNC: 100
PROTHROM AB SERPL-ACNC: 12.4 SEC — SIGNIFICANT CHANGE UP (ref 10.6–13.6)
RBC # BLD: 2.5 M/UL — LOW (ref 3.8–5.2)
RBC # FLD: 14.9 % — HIGH (ref 10.3–14.5)
RBC CASTS # UR COMP ASSIST: SIGNIFICANT CHANGE UP /HPF (ref 0–4)
SARS-COV-2 RNA SPEC QL NAA+PROBE: SIGNIFICANT CHANGE UP
SODIUM SERPL-SCNC: 140 MMOL/L — SIGNIFICANT CHANGE UP (ref 135–145)
SP GR SPEC: 1.02 — SIGNIFICANT CHANGE UP (ref 1.01–1.02)
TROPONIN I SERPL-MCNC: <.017 NG/ML — LOW (ref 0.02–0.06)
TROPONIN I SERPL-MCNC: <.017 NG/ML — LOW (ref 0.02–0.06)
UROBILINOGEN FLD QL: 8
WBC # BLD: 12.44 K/UL — HIGH (ref 3.8–10.5)
WBC # FLD AUTO: 12.44 K/UL — HIGH (ref 3.8–10.5)
WBC UR QL: SIGNIFICANT CHANGE UP /HPF (ref 0–5)

## 2020-09-12 PROCEDURE — 72125 CT NECK SPINE W/O DYE: CPT | Mod: 26

## 2020-09-12 PROCEDURE — 93010 ELECTROCARDIOGRAM REPORT: CPT | Mod: 76

## 2020-09-12 PROCEDURE — 72128 CT CHEST SPINE W/O DYE: CPT | Mod: 26

## 2020-09-12 PROCEDURE — 71045 X-RAY EXAM CHEST 1 VIEW: CPT | Mod: 26

## 2020-09-12 PROCEDURE — 99285 EMERGENCY DEPT VISIT HI MDM: CPT | Mod: CS

## 2020-09-12 PROCEDURE — 70450 CT HEAD/BRAIN W/O DYE: CPT | Mod: 26

## 2020-09-12 PROCEDURE — 99222 1ST HOSP IP/OBS MODERATE 55: CPT | Mod: AI

## 2020-09-12 PROCEDURE — 72192 CT PELVIS W/O DYE: CPT | Mod: 26

## 2020-09-12 PROCEDURE — 72131 CT LUMBAR SPINE W/O DYE: CPT | Mod: 26

## 2020-09-12 RX ORDER — TRAZODONE HCL 50 MG
200 TABLET ORAL AT BEDTIME
Refills: 0 | Status: DISCONTINUED | OUTPATIENT
Start: 2020-09-12 | End: 2020-09-21

## 2020-09-12 RX ORDER — DIVALPROEX SODIUM 500 MG/1
1 TABLET, DELAYED RELEASE ORAL
Qty: 0 | Refills: 0 | DISCHARGE

## 2020-09-12 RX ORDER — DIVALPROEX SODIUM 500 MG/1
500 TABLET, DELAYED RELEASE ORAL AT BEDTIME
Refills: 0 | Status: DISCONTINUED | OUTPATIENT
Start: 2020-09-12 | End: 2020-09-21

## 2020-09-12 RX ORDER — SITAGLIPTIN 50 MG/1
1 TABLET, FILM COATED ORAL
Qty: 0 | Refills: 0 | DISCHARGE

## 2020-09-12 RX ORDER — RISPERIDONE 4 MG/1
1 TABLET ORAL
Qty: 0 | Refills: 0 | DISCHARGE

## 2020-09-12 RX ORDER — ACETAMINOPHEN 500 MG
650 TABLET ORAL ONCE
Refills: 0 | Status: COMPLETED | OUTPATIENT
Start: 2020-09-12 | End: 2020-09-12

## 2020-09-12 RX ORDER — DEXTROSE 50 % IN WATER 50 %
25 SYRINGE (ML) INTRAVENOUS ONCE
Refills: 0 | Status: DISCONTINUED | OUTPATIENT
Start: 2020-09-12 | End: 2020-09-21

## 2020-09-12 RX ORDER — CHOLECALCIFEROL (VITAMIN D3) 125 MCG
1000 CAPSULE ORAL
Qty: 0 | Refills: 0 | DISCHARGE

## 2020-09-12 RX ORDER — INSULIN LISPRO 100/ML
VIAL (ML) SUBCUTANEOUS AT BEDTIME
Refills: 0 | Status: DISCONTINUED | OUTPATIENT
Start: 2020-09-12 | End: 2020-09-21

## 2020-09-12 RX ORDER — SODIUM CHLORIDE 9 MG/ML
1600 INJECTION INTRAMUSCULAR; INTRAVENOUS; SUBCUTANEOUS ONCE
Refills: 0 | Status: COMPLETED | OUTPATIENT
Start: 2020-09-12 | End: 2020-09-12

## 2020-09-12 RX ORDER — ENOXAPARIN SODIUM 100 MG/ML
60 INJECTION SUBCUTANEOUS EVERY 12 HOURS
Refills: 0 | Status: ACTIVE | OUTPATIENT
Start: 2020-09-12 | End: 2021-08-11

## 2020-09-12 RX ORDER — SODIUM CHLORIDE 9 MG/ML
1000 INJECTION, SOLUTION INTRAVENOUS
Refills: 0 | Status: DISCONTINUED | OUTPATIENT
Start: 2020-09-12 | End: 2020-09-21

## 2020-09-12 RX ORDER — LANOLIN ALCOHOL/MO/W.PET/CERES
1 CREAM (GRAM) TOPICAL
Qty: 0 | Refills: 0 | DISCHARGE

## 2020-09-12 RX ORDER — FLUTICASONE PROPIONATE 50 MCG
1 SPRAY, SUSPENSION NASAL
Qty: 0 | Refills: 0 | DISCHARGE

## 2020-09-12 RX ORDER — METFORMIN HYDROCHLORIDE 850 MG/1
1 TABLET ORAL
Qty: 0 | Refills: 0 | DISCHARGE

## 2020-09-12 RX ORDER — POTASSIUM CHLORIDE 20 MEQ
20 PACKET (EA) ORAL ONCE
Refills: 0 | Status: COMPLETED | OUTPATIENT
Start: 2020-09-12 | End: 2020-09-12

## 2020-09-12 RX ORDER — GLUCAGON INJECTION, SOLUTION 0.5 MG/.1ML
1 INJECTION, SOLUTION SUBCUTANEOUS ONCE
Refills: 0 | Status: DISCONTINUED | OUTPATIENT
Start: 2020-09-12 | End: 2020-09-21

## 2020-09-12 RX ORDER — PSYLLIUM SEED (WITH DEXTROSE)
1 POWDER (GRAM) ORAL
Qty: 0 | Refills: 0 | DISCHARGE

## 2020-09-12 RX ORDER — DOCUSATE SODIUM 100 MG
1 CAPSULE ORAL
Qty: 0 | Refills: 0 | DISCHARGE

## 2020-09-12 RX ORDER — INSULIN LISPRO 100/ML
VIAL (ML) SUBCUTANEOUS
Refills: 0 | Status: DISCONTINUED | OUTPATIENT
Start: 2020-09-12 | End: 2020-09-21

## 2020-09-12 RX ORDER — THIAMINE MONONITRATE (VIT B1) 100 MG
100 TABLET ORAL DAILY
Refills: 0 | Status: DISCONTINUED | OUTPATIENT
Start: 2020-09-12 | End: 2020-09-21

## 2020-09-12 RX ORDER — AMLODIPINE BESYLATE 2.5 MG/1
10 TABLET ORAL DAILY
Refills: 0 | Status: DISCONTINUED | OUTPATIENT
Start: 2020-09-12 | End: 2020-09-21

## 2020-09-12 RX ORDER — LORATADINE 10 MG/1
1 TABLET ORAL
Qty: 0 | Refills: 0 | DISCHARGE

## 2020-09-12 RX ORDER — ASPIRIN/CALCIUM CARB/MAGNESIUM 324 MG
81 TABLET ORAL DAILY
Refills: 0 | Status: ACTIVE | OUTPATIENT
Start: 2020-09-12 | End: 2021-08-11

## 2020-09-12 RX ORDER — INSULIN LISPRO 100/ML
0 VIAL (ML) SUBCUTANEOUS
Qty: 0 | Refills: 0 | DISCHARGE

## 2020-09-12 RX ORDER — DEXTROSE 50 % IN WATER 50 %
12.5 SYRINGE (ML) INTRAVENOUS ONCE
Refills: 0 | Status: DISCONTINUED | OUTPATIENT
Start: 2020-09-12 | End: 2020-09-21

## 2020-09-12 RX ORDER — DIVALPROEX SODIUM 500 MG/1
2 TABLET, DELAYED RELEASE ORAL
Qty: 0 | Refills: 0 | DISCHARGE

## 2020-09-12 RX ORDER — DEXTROSE 50 % IN WATER 50 %
15 SYRINGE (ML) INTRAVENOUS ONCE
Refills: 0 | Status: DISCONTINUED | OUTPATIENT
Start: 2020-09-12 | End: 2020-09-21

## 2020-09-12 RX ORDER — ZOLPIDEM TARTRATE 10 MG/1
1 TABLET ORAL
Qty: 0 | Refills: 0 | DISCHARGE

## 2020-09-12 RX ORDER — CEFTRIAXONE 500 MG/1
1000 INJECTION, POWDER, FOR SOLUTION INTRAMUSCULAR; INTRAVENOUS ONCE
Refills: 0 | Status: COMPLETED | OUTPATIENT
Start: 2020-09-12 | End: 2020-09-12

## 2020-09-12 RX ORDER — ENOXAPARIN SODIUM 100 MG/ML
80 INJECTION SUBCUTANEOUS
Refills: 0 | Status: DISCONTINUED | OUTPATIENT
Start: 2020-09-12 | End: 2020-09-12

## 2020-09-12 RX ORDER — LANOLIN ALCOHOL/MO/W.PET/CERES
5 CREAM (GRAM) TOPICAL AT BEDTIME
Refills: 0 | Status: DISCONTINUED | OUTPATIENT
Start: 2020-09-12 | End: 2020-09-12

## 2020-09-12 RX ORDER — ATORVASTATIN CALCIUM 80 MG/1
80 TABLET, FILM COATED ORAL AT BEDTIME
Refills: 0 | Status: DISCONTINUED | OUTPATIENT
Start: 2020-09-12 | End: 2020-09-21

## 2020-09-12 RX ORDER — LANOLIN ALCOHOL/MO/W.PET/CERES
6 CREAM (GRAM) TOPICAL AT BEDTIME
Refills: 0 | Status: DISCONTINUED | OUTPATIENT
Start: 2020-09-12 | End: 2020-09-21

## 2020-09-12 RX ORDER — ENOXAPARIN SODIUM 100 MG/ML
0.7 INJECTION SUBCUTANEOUS
Qty: 0 | Refills: 0 | DISCHARGE

## 2020-09-12 RX ORDER — POTASSIUM CHLORIDE 20 MEQ
1 PACKET (EA) ORAL
Qty: 0 | Refills: 0 | DISCHARGE

## 2020-09-12 RX ORDER — PREGABALIN 225 MG/1
1 CAPSULE ORAL
Qty: 0 | Refills: 0 | DISCHARGE

## 2020-09-12 RX ORDER — METOPROLOL TARTRATE 50 MG
50 TABLET ORAL
Refills: 0 | Status: DISCONTINUED | OUTPATIENT
Start: 2020-09-12 | End: 2020-09-13

## 2020-09-12 RX ADMIN — Medication 6 MILLIGRAM(S): at 21:09

## 2020-09-12 RX ADMIN — ATORVASTATIN CALCIUM 80 MILLIGRAM(S): 80 TABLET, FILM COATED ORAL at 21:09

## 2020-09-12 RX ADMIN — SODIUM CHLORIDE 1600 MILLILITER(S): 9 INJECTION INTRAMUSCULAR; INTRAVENOUS; SUBCUTANEOUS at 15:30

## 2020-09-12 RX ADMIN — Medication 20 MILLIEQUIVALENT(S): at 21:25

## 2020-09-12 RX ADMIN — CEFTRIAXONE 100 MILLIGRAM(S): 500 INJECTION, POWDER, FOR SOLUTION INTRAMUSCULAR; INTRAVENOUS at 14:45

## 2020-09-12 RX ADMIN — CEFTRIAXONE 1000 MILLIGRAM(S): 500 INJECTION, POWDER, FOR SOLUTION INTRAMUSCULAR; INTRAVENOUS at 15:15

## 2020-09-12 RX ADMIN — Medication 650 MILLIGRAM(S): at 16:40

## 2020-09-12 RX ADMIN — DIVALPROEX SODIUM 500 MILLIGRAM(S): 500 TABLET, DELAYED RELEASE ORAL at 21:24

## 2020-09-12 RX ADMIN — SODIUM CHLORIDE 1600 MILLILITER(S): 9 INJECTION INTRAMUSCULAR; INTRAVENOUS; SUBCUTANEOUS at 14:30

## 2020-09-12 RX ADMIN — Medication 200 MILLIGRAM(S): at 21:10

## 2020-09-12 NOTE — ED PROVIDER NOTE - OBJECTIVE STATEMENT
69 y/o F h/o HTN, CVA 2019, PE, Wernicke Korsakoff Dementia sent in from NH requesting evaluation for unwitnessed fall last night with worsening AMS. Patient unable to answer questions appropriately, does not follow commands, but states "I have back pain". Rectal temp 99.6. Tachy at 125. Altered. 71 y/o F h/o HTN, CVA 2019, PE, Wernicke Korsakoff Dementia sent in from NH requesting evaluation for unwitnessed fall last night with worsening AMS. Patient unable to answer questions appropriately, does not follow commands, but states "I have back pain". Rectal temp 99.6. Tachy at 125. Altered.  As per brother, pt had cva and pe sept 2019. has some dementia, cannot always provide history.

## 2020-09-12 NOTE — ED PROVIDER NOTE - CARE PLAN
Principal Discharge DX:	Traumatic injury of head, initial encounter  Secondary Diagnosis:	Low back pain, unspecified back pain laterality, unspecified chronicity, unspecified whether sciatica present   Principal Discharge DX:	Traumatic injury of head, initial encounter  Secondary Diagnosis:	Low back pain, unspecified back pain laterality, unspecified chronicity, unspecified whether sciatica present  Secondary Diagnosis:	Sepsis

## 2020-09-12 NOTE — ED ADULT TRIAGE NOTE - PAIN RATING/NUMBER SCALE (0-10): REST
After Visit Summary   12/8/2017    Cristiano Casanova    MRN: [id number]           Patient Information     Date Of Birth          1991        Visit Information        Provider Department      12/8/2017 1:00 PM Goltz, Bennett Ezra, PA-C [facility] [place]        Today's Diagnoses     MERRICK (obstructive sleep apnea)    -  1      Care Instructions    You will be provided with an auto-titrating CPAP with a pressure range of 10-17 cm with heated humidity to limit nasal congestion. Adjust the heat level on humidifier to find a setting that prevents dry nose but does not cause condensation in the hose or mask. Use distilled water in the humidifier.  The CPAP has a ramp function that starts the pressure lower than your prescribed pressure and gradually increases it over a number of minutes.  This may make it easier to fall asleep.    Try to use the CPAP every-night, all night (minimum of 4 hours). Many insurances require that we prove you are using the CPAP at least 4 hours on at least 70% of nights over a 30 day period. We have 90 days to meet those criteria.            Discussed weight management and the impact of weight gain on sleep apnea.  Let me know if you snore or feel the pressure is too high.    You can get new supplies (mask, hose and filter) for your CPAP every 3-6 months, covered by insurance. You do not need to get supplies that often, but they are available if you would like them.  You may exchange the mask once within the first month if you feel the initial mask does not fit well.  Contact your medical equipment provider for equipment issues.  Please let me know if you have any return of snoring, daytime sleepiness or poor sleep quality. We will want to make sure your CPAP is adequately treating your apnea.  There is a website called CPAP.com that has accessories that may make CPAP use easier. Please visit it at your convenience.  Our phone number is [phone].    Follow-up 2 month.   Bring your CPAP machine with you to the follow up appointment.    Options for help with weight loss:    [facility]  [phone]  [address], [address]  [place], [id number] [place] [address] Medical Weight Management  [phone]  [facility]   [place] [address], [address]   [place] [address]   See more at:  [email or web address]    Lifestyle Medicine Program for Weight Management  [facility] [address] [place] [address] Contact [phone] -   See more at: [email or web address]      Your BMI is Body mass index is 46.44 kg/(m^2).  Weight management is a personal decision.  If you are interested in exploring weight loss strategies, the following discussion covers the approaches that may be successful. Body mass index (BMI) is one way to tell whether you are at a healthy weight, overweight, or obese. It measures your weight in relation to your height.  A BMI of 18.5 to 24.9 is in the healthy range. A person with a BMI of 25 to 29.9 is considered overweight, and someone with a BMI of 30 or greater is considered obese. More than two-thirds of American adults are considered overweight or obese.  Being overweight or obese increases the risk for further weight gain. Excess weight may lead to heart disease and diabetes.  Creating and following plans for healthy eating and physical activity may help you improve your health.  Weight control is part of healthy lifestyle and includes exercise, emotional health, and healthy eating habits. Careful eating habits lifelong are the mainstay of weight control. Though there are significant health benefits from weight loss, long-term weight loss with diet alone may be very  difficult to achieve- studies show long-term success with dietary management in less than 10% of people. Attaining a healthy weight may be especially difficult to achieve in those with severe obesity. In some cases, medications, devices and surgical management might be considered.  What can you do?  If you are overweight or obese and are interested in methods for weight loss, you should discuss this with your provider.     Consider reducing daily calorie intake by 500 calories.     Keep a food journal.     Avoiding skipping meals, consider cutting portions instead.    Diet combined with exercise helps maintain muscle while optimizing fat loss. Strength training is particularly important for building and maintaining muscle mass. Exercise helps reduce stress, increase energy, and improves fitness. Increasing exercise without diet control, however, may not burn enough calories to loose weight.       Start walking three days a week 10-20 minutes at a time    Work towards walking thirty minutes five days a week     Eventually, increase the speed of your walking for 1-2 minutes at time    In addition, we recommend that you review healthy lifestyles and methods for weight loss available through the National Institutes of Health patient information sites:  http://win.niddk.nih.gov/publications/index.htm    And look into health and wellness programs that may be available through your health insurance provider, employer, local community center, or zach club.    Weight management plan: Patient was referred to their PCP to discuss a diet and exercise plan.            Follow-ups after your visit        Follow-up notes from your care team     Return in about 2 months (around 2/8/2018) for CPAP compliance recheck.      Your next 10 appointments already scheduled     Dec 08, 2017  4:40 PM CST   Office Visit with Kiel Georges PA-C   [facility]    [address]  "[place] [address]   [phone]           Bring a current list of meds and any records pertaining to this visit. For Physicals, please bring immunization records and any forms needing to be filled out. Please arrive 10 minutes early to complete paperwork.            Dec 26, 2017 10:00 AM CST   New Sleep Patient with Moiz Dubose MD   [facility]    [address]  [place] [id number] [phone]              Who to contact     If you have questions or need follow up information about today's clinic visit or your schedule please contact [facility] directly at [phone].  Normal or non-critical lab and imaging results will be communicated to you by [employer], letter or phone within 4 business days after the clinic has received the results. If you do not hear from us within 7 days, please contact the clinic through [employer] or phone. If you have a critical or abnormal lab result, we will notify you by phone as soon as possible.  Submit refill requests through [employer] or call your pharmacy and they will forward the refill request to us. Please allow 3 business days for your refill to be completed.          Additional Information About Your Visit        [employer] Information     [employer] gives you secure access to your electronic health record. If you see a primary care provider, you can also send messages to your care team and make appointments. If you have questions, please call your primary care clinic.  If you do not have a primary care provider, please call [phone] and they will assist you.        Care EveryWhere ID     This is your Care EveryWhere ID. This could be used by other organizations to access your [facility] medical records  [id number]        Your Vitals Were     Pulse Respirations Height Pulse Oximetry BMI (Body Mass Index)       105 20 1.727 m (5' 8\") 95% 46.44 kg/m2        Blood Pressure from Last " 10 3 Encounters:   12/08/17 131/79   03/10/17 (!) 138/98   03/09/17 (!) 140/100    Weight from Last 3 Encounters:   12/08/17 (!) 138.5 kg (305 lb 6.4 oz)   03/10/17 127 kg (280 lb)   03/09/17 127.9 kg (281 lb 14.4 oz)              We Performed the Following     Comprehensive DME        Primary Care Provider Office Phone # Fax #    Kiel Georges PA-C [phone] [id number]  [facility] [id number]        Equal Access to Services     [facility] : Hadii aad ku hadasho Soomaali, waaxda luqadaha, qaybta kaalmada adeegyada, maddy humphries haysara weber . So [facility] [phone].    ATENCIÓN: Si habla español, tiene a ratliff disposición servicios gratuitos de asistencia lingüística. [facility] [phone].    We comply with applicable federal civil rights laws and [place] laws. We do not discriminate on the basis of race, color, national origin, age, disability, sex, sexual orientation, or gender identity.            Thank you!     Thank you for choosing [facility]  for your care. Our goal is always to provide you with excellent care. Hearing back from our patients is one way we can continue to improve our services. Please take a few minutes to complete the written survey that you may receive in the mail after your visit with us. Thank you!             Your Updated Medication List - Protect others around you: Learn how to safely use, store and throw away your medicines at www.disposemymeds.org.          This list is accurate as of: 12/8/17  1:40 PM.  Always use your most recent med list.                   Brand Name Dispense Instructions for use Diagnosis    ibuprofen 200 MG capsule           lisinopril-hydrochlorothiazide 20-25 MG per tablet    PRINZIDE/ZESTORETIC    90 tablet    Take 2 tablets by mouth daily    Essential hypertension with goal blood pressure less than 140/90

## 2020-09-12 NOTE — ED PROVIDER NOTE - MUSCULOSKELETAL NECK EXAM
no deformity, pain or tenderness. no restriction of movement no deformity, pain or tenderness. no restriction of movement/large non tender left posterior thoracic musclulature

## 2020-09-12 NOTE — ED PROVIDER NOTE - NEUROLOGICAL LEVEL OF CONSCIOUSNESS
Patient alert however unable to answer questions appropriately, does not follow commands alert/Patient alert however unable to answer questions appropriately, does not follow commands

## 2020-09-12 NOTE — ED ADULT NURSE NOTE - OBJECTIVE STATEMENT
Patient brought into ED by EMS from [facility] for fall out of bed. Patient is alert to self, has history of dementia. Unable to give information. Patient complains of back pain 10/10.

## 2020-09-12 NOTE — ED PROVIDER NOTE - SECONDARY DIAGNOSIS.
Low back pain, unspecified back pain laterality, unspecified chronicity, unspecified whether sciatica present Sepsis

## 2020-09-12 NOTE — H&P ADULT - NSICDXPASTSURGICALHX_GEN_ALL_CORE_FT
PAST SURGICAL HISTORY:  Depression     HTN (hypertension)     Hyperlipemia     No significant past surgical history

## 2020-09-12 NOTE — ED CLERICAL - NS ED CLERK UNITS
GUIDO VILCHIS 80y Male  MRN#: [id number]         SUBJECTIVE  Patient is a 80y old Male who presents with a chief complaint of fall s/p splenic Artery embolization for splenic laceration   transferred from surgery yesterday (07 Jan 2019 11:12)  Currently admitted to medicine with the primary diagnosis of Splenic laceration, initial encounter    Today is hospital day 9d, and this morning pt is resting comfortably on NC O2.        OBJECTIVE  PAST MEDICAL & SURGICAL HISTORY  CAD (coronary artery disease)  Ventricular tachycardia  NANDO on CPAP  COPD (chronic obstructive pulmonary disease)  Pacemaker  AICD (automatic cardioverter/defibrillator) present  Hypothyroidism  Atrial fibrillation  Congestive heart disease  Hypercholesteremia  AICD (automatic cardioverter/defibrillator) present  History of laparoscopic cholecystectomy    ALLERGIES:  morphine (Stomach Upset)    MEDICATIONS:  STANDING MEDICATIONS  ALBUTerol/ipratropium for Nebulization 3 milliLiter(s) Nebulizer every 8 hours  amiodarone    Tablet 400 milliGRAM(s) Oral every 12 hours  atorvastatin 10 milliGRAM(s) Oral at bedtime  chlorhexidine 4% Liquid 1 Application(s) Topical <User Schedule>  dextrose 50% Injectable 12.5 Gram(s) IV Push once  dextrose 50% Injectable 25 Gram(s) IV Push once  dextrose 50% Injectable 25 Gram(s) IV Push once  finasteride 5 milliGRAM(s) Oral daily  furosemide    Tablet 40 milliGRAM(s) Oral daily  heparin  Injectable 5000 Unit(s) SubCutaneous every 8 hours  insulin lispro (HumaLOG) corrective regimen sliding scale   SubCutaneous three times a day before meals  levothyroxine 50 MICROGram(s) Oral daily  metoprolol tartrate 12.5 milliGRAM(s) Oral every 12 hours  montelukast 10 milliGRAM(s) Oral at bedtime  pantoprazole    Tablet 40 milliGRAM(s) Oral before breakfast  tamsulosin 0.4 milliGRAM(s) Oral at bedtime    PRN MEDICATIONS  benzocaine 15 mG/menthol 3.6 mG Lozenge 1 Lozenge Oral every 3 hours PRN  glucagon  Injectable 1 milliGRAM(s) IntraMuscular once PRN      Home Medications:  ALPRAZolam 0.25 mg oral tablet: 1 tab(s) orally once a day, As Needed (23 Oct 2018 00:22)  finasteride 5 mg oral tablet: 1 tab(s) orally once a day (23 Oct 2018 00:22)  glyBURIDE 5 mg oral tablet: 1 tab(s) orally once a day (23 Oct 2018 00:22)  levothyroxine 50 mcg (0.05 mg) oral capsule: 1 cap(s) orally once a day (23 Oct 2018 00:22)  montelukast 10 mg oral tablet: 1 tab(s) orally once a day (23 Oct 2018 00:22)  pravastatin 20 mg oral tablet: 1 tab(s) orally once a day (23 Oct 2018 00:22)  tamsulosin 0.4 mg oral capsule: 1 cap(s) orally once a day (23 Oct 2018 00:22)      VITAL SIGNS: Last 24 Hours  T(C): 35.8 (08 Jan 2019 08:00), Max: 36.4 (07 Jan 2019 16:00)  T(F): 96.5 (08 Jan 2019 08:00), Max: 97.6 (08 Jan 2019 00:00)  HR: 88 (08 Jan 2019 08:00) (68 - 88)  BP: 112/51 (08 Jan 2019 08:00) (108/58 - 112/51)  BP(mean): --  RR: 18 (08 Jan 2019 08:00) (16 - 18)  SpO2: 94% (08 Jan 2019 08:15) (94% - 94%)    LABS:                        8.9    13.05 )-----------( 392      ( 08 Jan 2019 05:26 )             26.7     01-08    140  |  98  |  32<H>  ----------------------------<  162<H>  4.0   |  25  |  1.4    Ca    8.1<L>      08 Jan 2019 05:26  Phos  1.7     01-07  Mg     1.8     01-08    TPro  4.6<L>  /  Alb  2.7<L>  /  TBili  2.3<H>  /  DBili  x   /  AST  27  /  ALT  16  /  AlkPhos  70  01-08                  I&O's Summary    07 Jan 2019 07:01  -  08 Jan 2019 07:00  --------------------------------------------------------  IN: 960 mL / OUT: 2025 mL / NET: -1065 mL    08 Jan 2019 07:01  -  08 Jan 2019 15:22  --------------------------------------------------------  IN: 480 mL / OUT: 1200 mL / NET: -720 mL          PHYSICAL EXAM:    General: Not in distress.   HEENT: Moist mucus membranes. PERRLA. +NC  Cardio: Regular rate and rhythm, S1, S2  Pulm: b/l breath sounds  Abdomen: Soft, non-tender, non-distended.   Extremities: No cyanosis or edema bilaterally.   Neuro: A&O x3. No focal deficits.       RADIOLOGY:    < from: Xray Chest 1 View- PORTABLE-Urgent (01.07.19 @ 13:02) >  Impression:      Basilar atelectasis. Support devices as described.    Without difference.    < end of copied text > TELE TELE/377K

## 2020-09-12 NOTE — H&P ADULT - NSHPLABSRESULTS_GEN_ALL_CORE
LABS:                        8.6    12.44 )-----------( 239      ( 12 Sep 2020 14:20 )             26.0         140  |  106  |  19  ----------------------------<  182<H>  4.8   |  22  |  0.89    Ca    9.1      12 Sep 2020 14:20    TPro  5.9<L>  /  Alb  2.5<L>  /  TBili  1.0  /  DBili  x   /  AST  19  /  ALT  18  /  AlkPhos  38<L>      PT/INR - ( 12 Sep 2020 14:20 )   PT: 12.4 sec;   INR: 1.03 ratio         PTT - ( 12 Sep 2020 14:20 )  PTT:31.8 sec  Urinalysis Basic - ( 12 Sep 2020 15:10 )    Color: Yellow / Appearance: Clear / S.025 / pH: x  Gluc: x / Ketone: Small  / Bili: Moderate / Urobili: 8   Blood: x / Protein: 100 / Nitrite: Negative   Leuk Esterase: Trace / RBC: 0-4 /HPF / WBC 3-5 /HPF   Sq Epi: x / Non Sq Epi: Neg.-Few / Bacteria: Few /HPF       CAPILLARY BLOOD GLUCOSE            Urinalysis Basic - ( 12 Sep 2020 15:10 )    Color: Yellow / Appearance: Clear / S.025 / pH: x  Gluc: x / Ketone: Small  / Bili: Moderate / Urobili: 8   Blood: x / Protein: 100 / Nitrite: Negative   Leuk Esterase: Trace / RBC: 0-4 /HPF / WBC 3-5 /HPF   Sq Epi: x / Non Sq Epi: Neg.-Few / Bacteria: Few /HPF        RADIOLOGY & ADDITIONAL TESTS:  < from: CT Lumbar Spine No Cont (20 @ 15:05) >    PROCEDURE DATE:  2020        INTERPRETATION:  CT thoracic and lumbar spine    History injury    There is no acute fracture or compression or subluxation or osseous demineralization or destruction. There is mild degenerative spondylolisthesis at C4-5 contributing to mild spinal stenosis. There is a sacralized L5 transitional vertebral body. There is no CT evidence of epidural collection. There is slight mid thoracic dextroscoliosis and mid lumbar levoscoliosis.    IMPRESSION:  No acute findings    < end of copied text >    < from: CT Cervical Spine No Cont (20 @ 15:05) >      IMPRESSION:  No acute findings    < end of copied text >    < from: CT Head No Cont (20 @ 15:05) >    Impression:  1. Unremarkable noncontrast CT scan of the brain.    < end of copied text >      Consultant(s) Notes Reviewed:  [x ] YES  [ ] NO  Care Discussed with Consultants/Other Providers [ x] YES  [ ] NO  Imaging Personally Reviewed:  [ ] YES  [ ] NO

## 2020-09-12 NOTE — ED ADULT TRIAGE NOTE - CHIEF COMPLAINT QUOTE
BIB EMS from [facility] for reported AMS, patient alert and awake upon triage, reporting a fall from the bed yesterday now c/o back and neck pain 10/10

## 2020-09-12 NOTE — ED PROVIDER NOTE - CLINICAL SUMMARY MEDICAL DECISION MAKING FREE TEXT BOX
69 y/o F h/o HTN, CVA 2019, PE, Wernicke Korsakoff Dementia sent in from NH requesting evaluation for unwitnessed fall last night with worsening AMS. Patient unable to answer questions appropriately, does not follow commands, but states "I have back pain". Rectal temp 99.6. Tachy at 125. Altered.  Ecchymosis noted to right lower back and right hip. Will obtain sepsis labs,  CT head, C, T, L-spine, bony pelvis, and re-evaluate. 71 y/o F h/o HTN, CVA 2019, PE, Wernicke Korsakoff Dementia sent in from NH requesting evaluation for unwitnessed fall last night with worsening AMS. Patient unable to answer questions appropriately, does not follow commands, but states "I have back pain". Rectal temp 99.6. Tachy at 125. Altered.  As per brother, pt had cva and pe sept 2019. has some dementia, cannot always provide history.   Will obtain sepsis labs,  CT head, C, T, L-spine, bony pelvis, and re-evaluate.

## 2020-09-12 NOTE — H&P ADULT - NSHPPHYSICALEXAM_GEN_ALL_CORE
Vital Signs Last 24 Hrs  T(F): 99.4 (12 Sep 2020 14:30), Max: 99.4 (12 Sep 2020 14:30)  HR: 100 (12 Sep 2020 17:00) (98 - 125)  BP: 127/69 (12 Sep 2020 17:00) (91/58 - 144/77)  RR: 19 (12 Sep 2020 17:00) (16 - 19)  SpO2: 98% (12 Sep 2020 17:00) (97% - 100%)    PHYSICAL EXAM:  GENERAL: NAD, well-groomed, well-developed  HEAD:  Atraumatic, Normocephalic  EYES: EOMI, conjunctiva and sclera clear  ENMT: Moist mucous membranes, Good dentition, no thrush  NECK: Supple, No JVD  CHEST/LUNG: Clear to auscultation bilaterally, good air entry, non-labored breathing  HEART: RRR; S1/S2, No murmur  ABDOMEN: Soft, Nontender, Nondistended; Bowel sounds present  VASCULAR: Normal pulses, Normal capillary refill  EXTREMITIES: No calf tenderness, No cyanosis, No edema  LYMPH: Normal; No lymphadenopathy noted  SKIN: Warm, Intact  PSYCH: Normal mood, Normal affect  NERVOUS SYSTEM:  A/O x3, Good concentration; CN 2-12 intact, No focal deficits

## 2020-09-12 NOTE — ED PROVIDER NOTE - PMH
Alcohol dependence    Alcohol dependence    Aphasia    Atherosclerotic cardiovascular disease    Bipolar 1 disorder    Cerebral infarction    Dementia    Dementia    Depression    Diabetes type 2, controlled    HTN (hypertension)    Hyperlipemia    Hyperlipidemia    Hypertension    Insomnia

## 2020-09-12 NOTE — ED PROVIDER NOTE - PROGRESS NOTE DETAILS
LILO Burnett: Pt signed out to me. Plan to fu labs and imaging and admit. LILO Burnett: Labs and imaging reviewed. d/w Dr. Jeol. LILO Burnett: Labs and imaging reviewed. d/w Dr. Joel.  pt with sepsis, likely urine origin, elevated lactate will continue hydration, admission

## 2020-09-12 NOTE — ED PROVIDER NOTE - ATTENDING CONTRIBUTION TO CARE
Dr. Mistry: I performed a face to face bedside interview with patient regarding history of present illness, review of symptoms and past medical history. I completed an independent physical exam.  I have discussed patient's plan of care with PA.   I agree with note as stated above, having amended the EMR as needed to reflect my findings.   This includes HISTORY OF PRESENT ILLNESS, HIV, PAST MEDICAL/SURGICAL/FAMILY/SOCIAL HISTORY, ALLERGIES AND HOME MEDICATIONS, REVIEW OF SYSTEMS, PHYSICAL EXAM, and any PROGRESS NOTES during the time I functioned as the attending physician for this patient.    dr mistry: 69 y/o F h/o HTN, CVA 2019, PE, Wernicke Korsakoff Dementia sent in from NH requesting evaluation for unwitnessed fall last night with worsening AMS. Patient unable to answer questions appropriately, does not follow commands, but states "I have back pain". Rectal temp 99.6. Tachy at 125. Altered.  As per brother, pt had cva and pe sept 2019. has some dementia, cannot always provide history.   Will obtain sepsis labs,  CT head, C, T, L-spine, bony pelvis, and re-evaluate.

## 2020-09-12 NOTE — H&P ADULT - NSICDXPASTMEDICALHX_GEN_ALL_CORE_FT
PAST MEDICAL HISTORY:  Alcohol dependence     Alcohol dependence     Aphasia     Atherosclerotic cardiovascular disease     Bipolar 1 disorder     Cerebral infarction     Dementia     Dementia     Depression     Diabetes type 2, controlled     HTN (hypertension)     Hyperlipemia     Hyperlipidemia     Hypertension     Insomnia

## 2020-09-12 NOTE — ED PROVIDER NOTE - EXTREMITY EXAM
no deformity, pain or tenderness, no restriction of movement/moving all extremities, ecchymosis to right lower back and hip

## 2020-09-12 NOTE — H&P ADULT - ASSESSMENT
70 year old with a history of past alcoholism, CVA, HTN, bipolar disease, HLD, diabetes, CAD, anemia, Korsakoff dementia comes with a 20 lb weight loss, increasing weakness, decreased appetite, and today was found on the floor by her toilet at her nursing facility [facility].   Due to patient's dementia unable to get further history.     IMPROVE VTE Individual Risk Assessment    RISK                                                                Points    [ x ] Previous VTE                                                  3    [  ] Thrombophilia                                               2    [  ] Lower limb paralysis                                      2        (unable to hold up >15 seconds)      [  ] Current Cancer                                              2         (within 6 months)    [x  ] Immobilization > 24 hrs                                1    [  ] ICU/CCU stay > 24 hours                              1    [  x] Age > 60                                                      1    IMPROVE VTE Score ______5-lovenox___    IMPROVE Score 0-1: Low Risk, No VTE prophylaxis required for most patients, encourage ambulation.   IMPROVE Score 2-3: At risk, pharmacologic VTE prophylaxis is indicated for most patients (in the absence of a contraindication)  IMPROVE Score > or = 4: High Risk, pharmacologic VTE prophylaxis is indicated for most patients (in the absence of a contraindication)

## 2020-09-12 NOTE — H&P ADULT - HISTORY OF PRESENT ILLNESS
70 year old with a history of past alcoholism, CVA, HTN, bipolar disease, HLD, diabetes, CAD, anemia, Korsakoff dementia comes with a 20 lb weight loss, increasing weakness, decreased appetite, and today was found on the floor by her toilet at her nursing facility [facility].   Due to patient's dementia unable to get further history.

## 2020-09-12 NOTE — H&P ADULT - PROBLEM SELECTOR PLAN 2
unclear source  not suspicious of infection  D-dimer low-unlikely PE  Creatinine kinase ordered patient has refused first draw

## 2020-09-13 LAB
A1C WITH ESTIMATED AVERAGE GLUCOSE RESULT: 5.1 % — SIGNIFICANT CHANGE UP (ref 4–5.6)
ABO RH CONFIRMATION: SIGNIFICANT CHANGE UP
ALBUMIN SERPL ELPH-MCNC: 2.4 G/DL — LOW (ref 3.3–5)
ALP SERPL-CCNC: 33 U/L — LOW (ref 40–120)
ALT FLD-CCNC: 16 U/L — SIGNIFICANT CHANGE UP (ref 10–45)
ANION GAP SERPL CALC-SCNC: 5 MMOL/L — SIGNIFICANT CHANGE UP (ref 5–17)
AST SERPL-CCNC: 18 U/L — SIGNIFICANT CHANGE UP (ref 10–40)
BASOPHILS # BLD AUTO: 0.04 K/UL — SIGNIFICANT CHANGE UP (ref 0–0.2)
BASOPHILS NFR BLD AUTO: 0.5 % — SIGNIFICANT CHANGE UP (ref 0–2)
BILIRUB SERPL-MCNC: 0.7 MG/DL — SIGNIFICANT CHANGE UP (ref 0.2–1.2)
BLD GP AB SCN SERPL QL: SIGNIFICANT CHANGE UP
BUN SERPL-MCNC: 17 MG/DL — SIGNIFICANT CHANGE UP (ref 7–23)
CALCIUM SERPL-MCNC: 8.3 MG/DL — LOW (ref 8.4–10.5)
CHLORIDE SERPL-SCNC: 106 MMOL/L — SIGNIFICANT CHANGE UP (ref 96–108)
CO2 SERPL-SCNC: 28 MMOL/L — SIGNIFICANT CHANGE UP (ref 22–31)
CREAT SERPL-MCNC: 0.56 MG/DL — SIGNIFICANT CHANGE UP (ref 0.5–1.3)
EOSINOPHIL # BLD AUTO: 0.01 K/UL — SIGNIFICANT CHANGE UP (ref 0–0.5)
EOSINOPHIL NFR BLD AUTO: 0.1 % — SIGNIFICANT CHANGE UP (ref 0–6)
ESTIMATED AVERAGE GLUCOSE: 100 MG/DL — SIGNIFICANT CHANGE UP (ref 68–114)
FERRITIN SERPL-MCNC: 176 NG/ML — HIGH (ref 15–150)
FOLATE SERPL-MCNC: 10.4 NG/ML — SIGNIFICANT CHANGE UP
GLUCOSE BLDC GLUCOMTR-MCNC: 128 MG/DL — HIGH (ref 70–99)
GLUCOSE BLDC GLUCOMTR-MCNC: 155 MG/DL — HIGH (ref 70–99)
GLUCOSE BLDC GLUCOMTR-MCNC: 164 MG/DL — HIGH (ref 70–99)
GLUCOSE BLDC GLUCOMTR-MCNC: 92 MG/DL — SIGNIFICANT CHANGE UP (ref 70–99)
GLUCOSE SERPL-MCNC: 121 MG/DL — HIGH (ref 70–99)
HCT VFR BLD CALC: 19.5 % — CRITICAL LOW (ref 34.5–45)
HCV AB S/CO SERPL IA: 0.05 S/CO — SIGNIFICANT CHANGE UP (ref 0–0.99)
HCV AB SERPL-IMP: SIGNIFICANT CHANGE UP
HGB BLD-MCNC: 6.2 G/DL — CRITICAL LOW (ref 11.5–15.5)
IMM GRANULOCYTES NFR BLD AUTO: 0.8 % — SIGNIFICANT CHANGE UP (ref 0–1.5)
LACTATE SERPL-SCNC: 1.2 MMOL/L — SIGNIFICANT CHANGE UP (ref 0.7–2)
LYMPHOCYTES # BLD AUTO: 1.47 K/UL — SIGNIFICANT CHANGE UP (ref 1–3.3)
LYMPHOCYTES # BLD AUTO: 19 % — SIGNIFICANT CHANGE UP (ref 13–44)
MCHC RBC-ENTMCNC: 31.8 GM/DL — LOW (ref 32–36)
MCHC RBC-ENTMCNC: 33.3 PG — SIGNIFICANT CHANGE UP (ref 27–34)
MCV RBC AUTO: 104.8 FL — HIGH (ref 80–100)
MONOCYTES # BLD AUTO: 0.59 K/UL — SIGNIFICANT CHANGE UP (ref 0–0.9)
MONOCYTES NFR BLD AUTO: 7.6 % — SIGNIFICANT CHANGE UP (ref 2–14)
NEUTROPHILS # BLD AUTO: 5.57 K/UL — SIGNIFICANT CHANGE UP (ref 1.8–7.4)
NEUTROPHILS NFR BLD AUTO: 72 % — SIGNIFICANT CHANGE UP (ref 43–77)
NRBC # BLD: 0 /100 WBCS — SIGNIFICANT CHANGE UP (ref 0–0)
PLATELET # BLD AUTO: 195 K/UL — SIGNIFICANT CHANGE UP (ref 150–400)
POTASSIUM SERPL-MCNC: 4.6 MMOL/L — SIGNIFICANT CHANGE UP (ref 3.5–5.3)
POTASSIUM SERPL-SCNC: 4.6 MMOL/L — SIGNIFICANT CHANGE UP (ref 3.5–5.3)
PROT SERPL-MCNC: 5.4 G/DL — LOW (ref 6–8.3)
RBC # BLD: 1.86 M/UL — LOW (ref 3.8–5.2)
RBC # BLD: 1.89 M/UL — LOW (ref 3.8–5.2)
RBC # FLD: 15 % — HIGH (ref 10.3–14.5)
RETICS #: 75.6 K/UL — SIGNIFICANT CHANGE UP (ref 25–125)
RETICS/RBC NFR: 4 % — HIGH (ref 0.5–2.5)
SARS-COV-2 IGG SERPL QL IA: POSITIVE
SARS-COV-2 IGM SERPL IA-ACNC: 2.11 INDEX — HIGH
SODIUM SERPL-SCNC: 139 MMOL/L — SIGNIFICANT CHANGE UP (ref 135–145)
TROPONIN I SERPL-MCNC: <.017 NG/ML — LOW (ref 0.02–0.06)
VIT B12 SERPL-MCNC: 862 PG/ML — SIGNIFICANT CHANGE UP (ref 232–1245)
WBC # BLD: 7.74 K/UL — SIGNIFICANT CHANGE UP (ref 3.8–10.5)
WBC # FLD AUTO: 7.74 K/UL — SIGNIFICANT CHANGE UP (ref 3.8–10.5)

## 2020-09-13 PROCEDURE — 93306 TTE W/DOPPLER COMPLETE: CPT | Mod: 26

## 2020-09-13 PROCEDURE — 99233 SBSQ HOSP IP/OBS HIGH 50: CPT

## 2020-09-13 PROCEDURE — 93010 ELECTROCARDIOGRAM REPORT: CPT

## 2020-09-13 RX ORDER — METOPROLOL TARTRATE 50 MG
25 TABLET ORAL
Refills: 0 | Status: DISCONTINUED | OUTPATIENT
Start: 2020-09-13 | End: 2020-09-21

## 2020-09-13 RX ADMIN — ATORVASTATIN CALCIUM 80 MILLIGRAM(S): 80 TABLET, FILM COATED ORAL at 21:37

## 2020-09-13 RX ADMIN — Medication 200 MILLIGRAM(S): at 21:37

## 2020-09-13 RX ADMIN — Medication 81 MILLIGRAM(S): at 11:11

## 2020-09-13 RX ADMIN — Medication 100 MILLIGRAM(S): at 11:11

## 2020-09-13 RX ADMIN — DIVALPROEX SODIUM 500 MILLIGRAM(S): 500 TABLET, DELAYED RELEASE ORAL at 21:38

## 2020-09-13 RX ADMIN — ENOXAPARIN SODIUM 60 MILLIGRAM(S): 100 INJECTION SUBCUTANEOUS at 17:39

## 2020-09-13 RX ADMIN — ENOXAPARIN SODIUM 60 MILLIGRAM(S): 100 INJECTION SUBCUTANEOUS at 05:41

## 2020-09-13 RX ADMIN — Medication 1: at 12:10

## 2020-09-13 RX ADMIN — Medication 25 MILLIGRAM(S): at 17:42

## 2020-09-13 RX ADMIN — AMLODIPINE BESYLATE 10 MILLIGRAM(S): 2.5 TABLET ORAL at 05:41

## 2020-09-13 RX ADMIN — Medication 0.5 MILLIGRAM(S): at 17:41

## 2020-09-13 RX ADMIN — Medication 0.5 MILLIGRAM(S): at 05:40

## 2020-09-13 RX ADMIN — Medication 50 MILLIGRAM(S): at 05:41

## 2020-09-13 RX ADMIN — Medication 6 MILLIGRAM(S): at 21:37

## 2020-09-13 NOTE — PROGRESS NOTE ADULT - SUBJECTIVE AND OBJECTIVE BOX
Patient is a 70y old  Female who presents with a chief complaint of weakness and fall (12 Sep 2020 17:31)    Patient seen and examined at bedside. No overnight events reported.     ALLERGIES:  No Known Allergies    MEDICATIONS  (STANDING):  amLODIPine   Tablet 10 milliGRAM(s) Oral daily  aspirin  chewable 81 milliGRAM(s) Oral daily  atorvastatin 80 milliGRAM(s) Oral at bedtime  dextrose 5%. 1000 milliLiter(s) (50 mL/Hr) IV Continuous <Continuous>  dextrose 50% Injectable 12.5 Gram(s) IV Push once  dextrose 50% Injectable 25 Gram(s) IV Push once  dextrose 50% Injectable 25 Gram(s) IV Push once  diVALproex  milliGRAM(s) Oral at bedtime  enoxaparin Injectable 60 milliGRAM(s) SubCutaneous every 12 hours  insulin lispro (HumaLOG) corrective regimen sliding scale   SubCutaneous three times a day before meals  insulin lispro (HumaLOG) corrective regimen sliding scale   SubCutaneous at bedtime  LORazepam     Tablet 0.5 milliGRAM(s) Oral two times a day  melatonin 6 milliGRAM(s) Oral at bedtime  metoprolol tartrate 50 milliGRAM(s) Oral two times a day  thiamine 100 milliGRAM(s) Oral daily  traZODone 200 milliGRAM(s) Oral at bedtime    MEDICATIONS  (PRN):  dextrose 40% Gel 15 Gram(s) Oral once PRN Blood Glucose LESS THAN 70 milliGRAM(s)/deciliter  glucagon  Injectable 1 milliGRAM(s) IntraMuscular once PRN Glucose LESS THAN 70 milligrams/deciliter    Vital Signs Last 24 Hrs  T(F): 98.4 (13 Sep 2020 10:34), Max: 99.4 (12 Sep 2020 14:30)  HR: 84 (13 Sep 2020 10:34) (84 - 125)  BP: 84/44 (13 Sep 2020 10:34) (84/44 - 144/77)  RR: 16 (13 Sep 2020 10:34) (16 - 19)  SpO2: 98% (13 Sep 2020 10:34) (97% - 100%)  I&O's Summary    PHYSICAL EXAM:  General: NAD, A/O x 2, pale  ENT: Moist mucous membranes, no thrush  Neck: Supple, No JVD  Lungs: Clear to auscultation bilaterally limited to poor effort, good air entry, non-labored breathing  Cardio: RRR, S1/S2  Abdomen: Soft, Nontender, Nondistended; Bowel sounds present  Extremities: No calf tenderness, No pitting edema    LABS:                        6.2    7.74  )-----------( 195      ( 13 Sep 2020 09:20 )             19.5         139  |  106  |  17  ----------------------------<  121  4.6   |  28  |  0.56    Ca    8.3      13 Sep 2020 09:20    TPro  5.4  /  Alb  2.4  /  TBili  0.7  /  DBili  x   /  AST  18  /  ALT  16  /  AlkPhos  33          eGFR if Non African American: 95 mL/min/1.73M2 (20 @ 09:20)  eGFR if African American: 110 mL/min/1.73M2 (20 @ 09:20)    PT/INR - ( 12 Sep 2020 14:20 )   PT: 12.4 sec;   INR: 1.03 ratio         PTT - ( 12 Sep 2020 14:20 )  PTT:31.8 sec  Lactate, Blood: 1.2 mmol/L ( @ 09:20)  Lactate, Blood: 1.9 mmol/L ( @ 23:00)  Lactate, Blood: 1.5 mmol/L ( @ 19:45)  Lactate, Blood: 4.1 mmol/L ( @ 16:00)  Lactate, Blood: 4.2 mmol/L ( @ 14:20)      CARDIAC MARKERS ( 13 Sep 2020 09:20 )  <.017 ng/mL / x     / x     / x     / x      CARDIAC MARKERS ( 12 Sep 2020 23:00 )  <.017 ng/mL / x     / x     / x     / x      CARDIAC MARKERS ( 12 Sep 2020 19:45 )  <.017 ng/mL / x     / 29 U/L / x     / x        POCT Blood Glucose.: 155 mg/dL (13 Sep 2020 11:40)  POCT Blood Glucose.: 128 mg/dL (13 Sep 2020 08:17)  POCT Blood Glucose.: 121 mg/dL (12 Sep 2020 21:04)      Urinalysis Basic - ( 12 Sep 2020 15:10 )    Color: Yellow / Appearance: Clear / S.025 / pH: x  Gluc: x / Ketone: Small  / Bili: Moderate / Urobili: 8   Blood: x / Protein: 100 / Nitrite: Negative   Leuk Esterase: Trace / RBC: 0-4 /HPF / WBC 3-5 /HPF   Sq Epi: x / Non Sq Epi: Neg.-Few / Bacteria: Few /HPF      RADIOLOGY & ADDITIONAL TESTS:  < from: TTE Echo Complete w/o Contrast w/ Doppler (20 @ 08:42) >  Summary:   1. Left ventricular ejection fraction, by visual estimation, is 55 to 60%.   2. Technically difficult study.   3. Normal global left ventricular systolic function.   4. Mild thickening and calcification of the anterior and posterior mitral valve leaflets.   5. Moderate mitral annular calcification.   6. Trace mitral valve regurgitation.   7. Moderate to severe aortic regurgitation.   8. Sclerotic aortic valve with normal opening.   9. The main pulmonary artery is normal in size.    < end of copied text >    Care Discussed with Consultants/Other Providers: Dr. Torres - cardiology

## 2020-09-13 NOTE — PROGRESS NOTE ADULT - ASSESSMENT
70 year old with a history of past alcoholism, CVA, HTN, bipolar disease, HLD, diabetes 2, CAD, anemia, Korsakoff dementia comes with a 20 lb weight loss, increasing weakness, decreased appetite, and was noted to be pale and weak the day of admission. Patient also had a fall after using the bathroom the previous day. Limited history due to dementia    #Symptomatic anemia  -Transfuse 1U PRBC today  -Anemia workup: ferritin, iron, B12, folate, LDH, haptoglobin, reticulocyte  -Eventual colonoscopy (inpatient vs outpatient)  -FOBT negative x1, will repeat for confirmation  -The drop in H/H during this admission likely due to "correction" after getting IVF, and not due to acute blood loss    #KATIE (baseline Cr 0.4, was 0.8 on admission)  -IVF   -Cr now downtrending s/p IVF    #History of PE  -c/w Lovenox (benefit>risk)  -IF H/H does not respond appropriately, would stop Lovenox  -Patient was given supratherapeutic dose as outpatient (due to weight lose - was never dose-adjusted)     #History of CVA  -c/w ASA, statin  -may need to hold ASA pending response to PRBC    #Lactic Acidosis secondary to dehydartion: improved s/p IVF    #History of DM2  -Last A1C 6.3 in Sept 2019  -Repeat A1C in progress    #Essential HTN: BP has been "soft" will decrease beta blocker to 25 mg bid, may titrate off all together pending BPs over next 24 hours    #Weight loss: Will eventually need screening oncologic workup, will check CEA / CA 19-9 / AFP in AM    #DVT ppx: Lovenox

## 2020-09-14 PROBLEM — I10 ESSENTIAL (PRIMARY) HYPERTENSION: Chronic | Status: ACTIVE | Noted: 2019-11-01

## 2020-09-14 PROBLEM — F10.20 ALCOHOL DEPENDENCE, UNCOMPLICATED: Chronic | Status: ACTIVE | Noted: 2019-11-01

## 2020-09-14 PROBLEM — F03.90 UNSPECIFIED DEMENTIA WITHOUT BEHAVIORAL DISTURBANCE: Chronic | Status: ACTIVE | Noted: 2019-11-01

## 2020-09-14 PROBLEM — G47.00 INSOMNIA, UNSPECIFIED: Chronic | Status: ACTIVE | Noted: 2019-11-01

## 2020-09-14 PROBLEM — I25.10 ATHEROSCLEROTIC HEART DISEASE OF NATIVE CORONARY ARTERY WITHOUT ANGINA PECTORIS: Chronic | Status: ACTIVE | Noted: 2019-11-01

## 2020-09-14 PROBLEM — I63.9 CEREBRAL INFARCTION, UNSPECIFIED: Chronic | Status: ACTIVE | Noted: 2019-11-01

## 2020-09-14 PROBLEM — F31.9 BIPOLAR DISORDER, UNSPECIFIED: Chronic | Status: ACTIVE | Noted: 2019-11-01

## 2020-09-14 PROBLEM — R47.01 APHASIA: Chronic | Status: ACTIVE | Noted: 2019-11-01

## 2020-09-14 PROBLEM — E11.9 TYPE 2 DIABETES MELLITUS WITHOUT COMPLICATIONS: Chronic | Status: ACTIVE | Noted: 2019-11-01

## 2020-09-14 PROBLEM — E78.5 HYPERLIPIDEMIA, UNSPECIFIED: Chronic | Status: ACTIVE | Noted: 2019-11-01

## 2020-09-14 LAB
AFP-TM SERPL-MCNC: 2.3 NG/ML — SIGNIFICANT CHANGE UP
BASOPHILS # BLD AUTO: 0.03 K/UL — SIGNIFICANT CHANGE UP (ref 0–0.2)
BASOPHILS NFR BLD AUTO: 0.4 % — SIGNIFICANT CHANGE UP (ref 0–2)
CANCER AG19-9 SERPL-ACNC: 37 U/ML — HIGH
CEA SERPL-MCNC: 4.4 NG/ML — HIGH (ref 0–3.8)
CULTURE RESULTS: SIGNIFICANT CHANGE UP
EOSINOPHIL # BLD AUTO: 0.04 K/UL — SIGNIFICANT CHANGE UP (ref 0–0.5)
EOSINOPHIL NFR BLD AUTO: 0.6 % — SIGNIFICANT CHANGE UP (ref 0–6)
GLUCOSE BLDC GLUCOMTR-MCNC: 118 MG/DL — HIGH (ref 70–99)
GLUCOSE BLDC GLUCOMTR-MCNC: 140 MG/DL — HIGH (ref 70–99)
GLUCOSE BLDC GLUCOMTR-MCNC: 147 MG/DL — HIGH (ref 70–99)
GLUCOSE BLDC GLUCOMTR-MCNC: 155 MG/DL — HIGH (ref 70–99)
HAPTOGLOB SERPL-MCNC: 133 MG/DL — SIGNIFICANT CHANGE UP (ref 34–200)
HCT VFR BLD CALC: 20.5 % — CRITICAL LOW (ref 34.5–45)
HCT VFR BLD CALC: 22.6 % — LOW (ref 34.5–45)
HGB BLD-MCNC: 6.7 G/DL — CRITICAL LOW (ref 11.5–15.5)
HGB BLD-MCNC: 7.7 G/DL — LOW (ref 11.5–15.5)
IMM GRANULOCYTES NFR BLD AUTO: 0.9 % — SIGNIFICANT CHANGE UP (ref 0–1.5)
IRON SATN MFR SERPL: 28 % — SIGNIFICANT CHANGE UP (ref 14–50)
IRON SATN MFR SERPL: 66 UG/DL — SIGNIFICANT CHANGE UP (ref 30–160)
LDH SERPL L TO P-CCNC: 211 U/L — SIGNIFICANT CHANGE UP (ref 50–242)
LYMPHOCYTES # BLD AUTO: 1.82 K/UL — SIGNIFICANT CHANGE UP (ref 1–3.3)
LYMPHOCYTES # BLD AUTO: 27.1 % — SIGNIFICANT CHANGE UP (ref 13–44)
MCHC RBC-ENTMCNC: 32.2 PG — SIGNIFICANT CHANGE UP (ref 27–34)
MCHC RBC-ENTMCNC: 32.4 PG — SIGNIFICANT CHANGE UP (ref 27–34)
MCHC RBC-ENTMCNC: 32.7 GM/DL — SIGNIFICANT CHANGE UP (ref 32–36)
MCHC RBC-ENTMCNC: 34.1 GM/DL — SIGNIFICANT CHANGE UP (ref 32–36)
MCV RBC AUTO: 95 FL — SIGNIFICANT CHANGE UP (ref 80–100)
MCV RBC AUTO: 98.6 FL — SIGNIFICANT CHANGE UP (ref 80–100)
MONOCYTES # BLD AUTO: 0.69 K/UL — SIGNIFICANT CHANGE UP (ref 0–0.9)
MONOCYTES NFR BLD AUTO: 10.3 % — SIGNIFICANT CHANGE UP (ref 2–14)
NEUTROPHILS # BLD AUTO: 4.07 K/UL — SIGNIFICANT CHANGE UP (ref 1.8–7.4)
NEUTROPHILS NFR BLD AUTO: 60.7 % — SIGNIFICANT CHANGE UP (ref 43–77)
NRBC # BLD: 0 /100 WBCS — SIGNIFICANT CHANGE UP (ref 0–0)
NRBC # BLD: 0 /100 WBCS — SIGNIFICANT CHANGE UP (ref 0–0)
PLATELET # BLD AUTO: 149 K/UL — LOW (ref 150–400)
PLATELET # BLD AUTO: 163 K/UL — SIGNIFICANT CHANGE UP (ref 150–400)
RBC # BLD: 2.08 M/UL — LOW (ref 3.8–5.2)
RBC # BLD: 2.38 M/UL — LOW (ref 3.8–5.2)
RBC # FLD: 17.5 % — HIGH (ref 10.3–14.5)
RBC # FLD: 18.2 % — HIGH (ref 10.3–14.5)
SPECIMEN SOURCE: SIGNIFICANT CHANGE UP
TIBC SERPL-MCNC: 234 UG/DL — SIGNIFICANT CHANGE UP (ref 220–430)
UIBC SERPL-MCNC: 167 UG/DL — SIGNIFICANT CHANGE UP (ref 110–370)
WBC # BLD: 5.87 K/UL — SIGNIFICANT CHANGE UP (ref 3.8–10.5)
WBC # BLD: 6.71 K/UL — SIGNIFICANT CHANGE UP (ref 3.8–10.5)
WBC # FLD AUTO: 5.87 K/UL — SIGNIFICANT CHANGE UP (ref 3.8–10.5)
WBC # FLD AUTO: 6.71 K/UL — SIGNIFICANT CHANGE UP (ref 3.8–10.5)

## 2020-09-14 PROCEDURE — 99497 ADVNCD CARE PLAN 30 MIN: CPT

## 2020-09-14 PROCEDURE — 74177 CT ABD & PELVIS W/CONTRAST: CPT | Mod: 26

## 2020-09-14 PROCEDURE — 99233 SBSQ HOSP IP/OBS HIGH 50: CPT | Mod: GC

## 2020-09-14 PROCEDURE — 99223 1ST HOSP IP/OBS HIGH 75: CPT | Mod: 25

## 2020-09-14 RX ADMIN — Medication 100 MILLIGRAM(S): at 10:41

## 2020-09-14 RX ADMIN — Medication 6 MILLIGRAM(S): at 20:51

## 2020-09-14 RX ADMIN — Medication 81 MILLIGRAM(S): at 10:41

## 2020-09-14 RX ADMIN — Medication 25 MILLIGRAM(S): at 05:54

## 2020-09-14 RX ADMIN — AMLODIPINE BESYLATE 10 MILLIGRAM(S): 2.5 TABLET ORAL at 05:54

## 2020-09-14 RX ADMIN — Medication 0.5 MILLIGRAM(S): at 17:32

## 2020-09-14 RX ADMIN — Medication 0.5 MILLIGRAM(S): at 05:54

## 2020-09-14 RX ADMIN — Medication 25 MILLIGRAM(S): at 17:32

## 2020-09-14 RX ADMIN — ENOXAPARIN SODIUM 60 MILLIGRAM(S): 100 INJECTION SUBCUTANEOUS at 17:32

## 2020-09-14 RX ADMIN — ENOXAPARIN SODIUM 60 MILLIGRAM(S): 100 INJECTION SUBCUTANEOUS at 05:54

## 2020-09-14 RX ADMIN — Medication 200 MILLIGRAM(S): at 20:51

## 2020-09-14 RX ADMIN — DIVALPROEX SODIUM 500 MILLIGRAM(S): 500 TABLET, DELAYED RELEASE ORAL at 20:51

## 2020-09-14 RX ADMIN — ATORVASTATIN CALCIUM 80 MILLIGRAM(S): 80 TABLET, FILM COATED ORAL at 20:51

## 2020-09-14 NOTE — PROGRESS NOTE ADULT - SUBJECTIVE AND OBJECTIVE BOX
Patient is a 70y old  Female who presents with a chief complaint of weakness and fall (13 Sep 2020 13:00)    Patient seen and examined at bedside.  no acute events overnight    ALLERGIES:  No Known Allergies        Vital Signs Last 24 Hrs  T(F): 98.1 (14 Sep 2020 09:20), Max: 98.7 (13 Sep 2020 14:15)  HR: 78 (14 Sep 2020 09:20) (78 - 92)  BP: 102/58 (14 Sep 2020 09:20) (98/62 - 115/60)  RR: 16 (14 Sep 2020 09:20) (14 - 16)  SpO2: 99% (14 Sep 2020 09:20) (97% - 100%)  I&O's Summary    13 Sep 2020 07:01  -  14 Sep 2020 07:00  --------------------------------------------------------  IN: 888 mL / OUT: 200 mL / NET: 688 mL    14 Sep 2020 07:01  -  14 Sep 2020 10:42  --------------------------------------------------------  IN: 120 mL / OUT: 0 mL / NET: 120 mL      MEDICATIONS:  amLODIPine   Tablet 10 milliGRAM(s) Oral daily  aspirin  chewable 81 milliGRAM(s) Oral daily  atorvastatin 80 milliGRAM(s) Oral at bedtime  dextrose 40% Gel 15 Gram(s) Oral once PRN  dextrose 5%. 1000 milliLiter(s) IV Continuous <Continuous>  dextrose 50% Injectable 12.5 Gram(s) IV Push once  dextrose 50% Injectable 25 Gram(s) IV Push once  dextrose 50% Injectable 25 Gram(s) IV Push once  diVALproex  milliGRAM(s) Oral at bedtime  enoxaparin Injectable 60 milliGRAM(s) SubCutaneous every 12 hours  glucagon  Injectable 1 milliGRAM(s) IntraMuscular once PRN  insulin lispro (HumaLOG) corrective regimen sliding scale   SubCutaneous three times a day before meals  insulin lispro (HumaLOG) corrective regimen sliding scale   SubCutaneous at bedtime  LORazepam     Tablet 0.5 milliGRAM(s) Oral two times a day  melatonin 6 milliGRAM(s) Oral at bedtime  metoprolol tartrate 25 milliGRAM(s) Oral two times a day  thiamine 100 milliGRAM(s) Oral daily  traZODone 200 milliGRAM(s) Oral at bedtime      PHYSICAL EXAM:  General: NAD, Appears pale  ENT: MMM, no oral thrush  Neck: Supple, No JVD  Lungs: Clear to auscultation bilaterally, non labored breathing  Cardio: S1S2 regular  Abdomen: Soft, Nontender, Nondistended; Bowel sounds present  Extremities: No cyanosis, No edema    LABS:                        6.7    6.71  )-----------( 149      ( 14 Sep 2020 07:00 )             20.5         139  |  106  |  17  ----------------------------<  121  4.6   |  28  |  0.56    Ca    8.3      13 Sep 2020 09:20    TPro  5.4  /  Alb  2.4  /  TBili  0.7  /  DBili  x   /  AST  18  /  ALT  16  /  AlkPhos  33      eGFR if Non African American: 95 mL/min/1.73M2 (20 @ 09:20)  eGFR if African American: 110 mL/min/1.73M2 (20 @ 09:20)    PT/INR - ( 12 Sep 2020 14:20 )   PT: 12.4 sec;   INR: 1.03 ratio         PTT - ( 12 Sep 2020 14:20 )  PTT:31.8 sec  Lactate, Blood: 1.2 mmol/L ( @ 09:20)  Lactate, Blood: 1.9 mmol/L ( @ 23:00)  Lactate, Blood: 1.5 mmol/L ( @ 19:45)  Lactate, Blood: 4.1 mmol/L ( @ 16:00)  Lactate, Blood: 4.2 mmol/L ( @ 14:20)    CARDIAC MARKERS ( 13 Sep 2020 09:20 )  <.017 ng/mL / x     / x     / x     / x      CARDIAC MARKERS ( 12 Sep 2020 23:00 )  <.017 ng/mL / x     / x     / x     / x      CARDIAC MARKERS ( 12 Sep 2020 19:45 )  <.017 ng/mL / x     / 29 U/L / x     / x                        POCT Blood Glucose.: 118 mg/dL (14 Sep 2020 08:29)  POCT Blood Glucose.: 164 mg/dL (13 Sep 2020 21:36)  POCT Blood Glucose.: 92 mg/dL (13 Sep 2020 17:18)  POCT Blood Glucose.: 155 mg/dL (13 Sep 2020 11:40)      Urinalysis Basic - ( 12 Sep 2020 15:10 )    Color: Yellow / Appearance: Clear / S.025 / pH: x  Gluc: x / Ketone: Small  / Bili: Moderate / Urobili: 8   Blood: x / Protein: 100 / Nitrite: Negative   Leuk Esterase: Trace / RBC: 0-4 /HPF / WBC 3-5 /HPF   Sq Epi: x / Non Sq Epi: Neg.-Few / Bacteria: Few /HPF        Culture - Blood (collected 12 Sep 2020 14:20)  Source: .Blood Blood-Peripheral  Preliminary Report (13 Sep 2020 21:01):    No growth to date.    Culture - Blood (collected 12 Sep 2020 14:20)  Source: .Blood Blood-Peripheral  Preliminary Report (13 Sep 2020 21:01):    No growth to date.        RADIOLOGY & ADDITIONAL TESTS:    < from: TTE Echo Complete w/o Contrast w/ Doppler (20 @ 08:42) >  Summary:   1. Left ventricular ejection fraction, by visual estimation, is 55 to 60%.   2. Technically difficult study.   3. Normal global left ventricular systolic function.   4. Mild thickening and calcification of the anterior and posterior mitral valve leaflets.   5. Moderate mitral annular calcification.   6. Trace mitral valve regurgitation.   7. Moderate to severe aortic regurgitation.   8. Sclerotic aortic valve with normal opening.   9. The main pulmonary artery is normal in size.    [id number] Varun Torres MD,[facility] , Electronically signed on 2020 at 11:02:37 AM    < end of copied text >        Care Discussed with Consultants/Other Providers:    Patient is a 70y old  Female who presents with a chief complaint of weakness and fall (13 Sep 2020 13:00)    Patient seen and examined at bedside.  no acute events overnight    ALLERGIES:  No Known Allergies        Vital Signs Last 24 Hrs  T(F): 98.1 (14 Sep 2020 09:20), Max: 98.7 (13 Sep 2020 14:15)  HR: 78 (14 Sep 2020 09:20) (78 - 92)  BP: 102/58 (14 Sep 2020 09:20) (98/62 - 115/60)  RR: 16 (14 Sep 2020 09:20) (14 - 16)  SpO2: 99% (14 Sep 2020 09:20) (97% - 100%)  I&O's Summary    13 Sep 2020 07:01  -  14 Sep 2020 07:00  --------------------------------------------------------  IN: 888 mL / OUT: 200 mL / NET: 688 mL    14 Sep 2020 07:01  -  14 Sep 2020 10:42  --------------------------------------------------------  IN: 120 mL / OUT: 0 mL / NET: 120 mL      MEDICATIONS:  amLODIPine   Tablet 10 milliGRAM(s) Oral daily  aspirin  chewable 81 milliGRAM(s) Oral daily  atorvastatin 80 milliGRAM(s) Oral at bedtime  dextrose 40% Gel 15 Gram(s) Oral once PRN  dextrose 5%. 1000 milliLiter(s) IV Continuous <Continuous>  dextrose 50% Injectable 12.5 Gram(s) IV Push once  dextrose 50% Injectable 25 Gram(s) IV Push once  dextrose 50% Injectable 25 Gram(s) IV Push once  diVALproex  milliGRAM(s) Oral at bedtime  enoxaparin Injectable 60 milliGRAM(s) SubCutaneous every 12 hours  glucagon  Injectable 1 milliGRAM(s) IntraMuscular once PRN  insulin lispro (HumaLOG) corrective regimen sliding scale   SubCutaneous three times a day before meals  insulin lispro (HumaLOG) corrective regimen sliding scale   SubCutaneous at bedtime  LORazepam     Tablet 0.5 milliGRAM(s) Oral two times a day  melatonin 6 milliGRAM(s) Oral at bedtime  metoprolol tartrate 25 milliGRAM(s) Oral two times a day  thiamine 100 milliGRAM(s) Oral daily  traZODone 200 milliGRAM(s) Oral at bedtime      PHYSICAL EXAM:  General: NAD, Appears pale  ENT: MMM, no oral thrush  Neck: Supple, No JVD  Lungs: Clear to auscultation bilaterally, non labored breathing  Cardio: S1S2 regular  Abdomen: Soft, Nontender, Nondistended; Bowel sounds present  Extremities: No cyanosis, No edema, no calf tenderness    LABS:                        6.7    6.71  )-----------( 149      ( 14 Sep 2020 07:00 )             20.5         139  |  106  |  17  ----------------------------<  121  4.6   |  28  |  0.56    Ca    8.3      13 Sep 2020 09:20    TPro  5.4  /  Alb  2.4  /  TBili  0.7  /  DBili  x   /  AST  18  /  ALT  16  /  AlkPhos  33      eGFR if Non African American: 95 mL/min/1.73M2 (20 @ 09:20)  eGFR if African American: 110 mL/min/1.73M2 (20 @ 09:20)    PT/INR - ( 12 Sep 2020 14:20 )   PT: 12.4 sec;   INR: 1.03 ratio         PTT - ( 12 Sep 2020 14:20 )  PTT:31.8 sec  Lactate, Blood: 1.2 mmol/L ( @ 09:20)  Lactate, Blood: 1.9 mmol/L ( @ 23:00)  Lactate, Blood: 1.5 mmol/L ( @ 19:45)  Lactate, Blood: 4.1 mmol/L ( @ 16:00)  Lactate, Blood: 4.2 mmol/L ( @ 14:20)    CARDIAC MARKERS ( 13 Sep 2020 09:20 )  <.017 ng/mL / x     / x     / x     / x      CARDIAC MARKERS ( 12 Sep 2020 23:00 )  <.017 ng/mL / x     / x     / x     / x      CARDIAC MARKERS ( 12 Sep 2020 19:45 )  <.017 ng/mL / x     / 29 U/L / x     / x                        POCT Blood Glucose.: 118 mg/dL (14 Sep 2020 08:29)  POCT Blood Glucose.: 164 mg/dL (13 Sep 2020 21:36)  POCT Blood Glucose.: 92 mg/dL (13 Sep 2020 17:18)  POCT Blood Glucose.: 155 mg/dL (13 Sep 2020 11:40)      Urinalysis Basic - ( 12 Sep 2020 15:10 )    Color: Yellow / Appearance: Clear / S.025 / pH: x  Gluc: x / Ketone: Small  / Bili: Moderate / Urobili: 8   Blood: x / Protein: 100 / Nitrite: Negative   Leuk Esterase: Trace / RBC: 0-4 /HPF / WBC 3-5 /HPF   Sq Epi: x / Non Sq Epi: Neg.-Few / Bacteria: Few /HPF        Culture - Blood (collected 12 Sep 2020 14:20)  Source: .Blood Blood-Peripheral  Preliminary Report (13 Sep 2020 21:01):    No growth to date.    Culture - Blood (collected 12 Sep 2020 14:20)  Source: .Blood Blood-Peripheral  Preliminary Report (13 Sep 2020 21:01):    No growth to date.        RADIOLOGY & ADDITIONAL TESTS:    < from: TTE Echo Complete w/o Contrast w/ Doppler (20 @ 08:42) >  Summary:   1. Left ventricular ejection fraction, by visual estimation, is 55 to 60%.   2. Technically difficult study.   3. Normal global left ventricular systolic function.   4. Mild thickening and calcification of the anterior and posterior mitral valve leaflets.   5. Moderate mitral annular calcification.   6. Trace mitral valve regurgitation.   7. Moderate to severe aortic regurgitation.   8. Sclerotic aortic valve with normal opening.   9. The main pulmonary artery is normal in size.    [id number] Varun Torres MD,[facility] , Electronically signed on 2020 at 11:02:37 AM    < end of copied text >        Care Discussed with Consultants/Other Providers:

## 2020-09-14 NOTE — GOALS OF CARE CONVERSATION - ADVANCED CARE PLANNING - CONVERSATION DETAILS
I called and spoke to her brother Madhu, he is her legal guardian and able to make all decisions for the pt.  We discussed her pmhx, some previous hospitalizations , and her current clinical condition, he says he was also updated by medical team today. We discussed pts advanced directives, we reviewed CPR and intubation, brother states he knows her wishes would be to have cpr performed. I discussed with him that her dementia will not improve but only continue to progress, and intubating may cause even further cognitive decline. He says he understood but believes these are her wishes. I explained that this conversation can always be re-visited in the future and when things change with his sister . He agreed but said for now, he wants full code. He has my contact info for any questions.

## 2020-09-14 NOTE — PROGRESS NOTE ADULT - ASSESSMENT
70 female with history of past alcoholism, cva, htn, bipolar d/o, hld, t2dm, cad, anemia, korsakoff dementia presents with 20 lb unintentional weight loss, decreased appetite and was noted to be pale and weak the day of admission, also noted to have a fall after using the bathroom on 9/12.    # Symptomatic Anemia  cbc 6.2 yesterday after receiving one unit PRBCs  CBC today 6.7 - plan to transfuse another unit PRBCs  Follow up anemia workup - ferritin 176, iron level, b12 862, folate 10.4. LDH, haptoglobin, retic count 75.6  FOBT positive, follow up repeat  eventual colonoscopy (inpatient vs outpatient)  continue to monitor cbc    # KATIE  baseline Cr 0.4- at or near baseline s/p IVF  avoid nephrotoxins, monitor renal indices    # History of PE  continue full dose lovenox 60 BID    # Unintentional Weight Loss  follow up tumor markers CEA, CA 19-9, AFP  will likely require outpatient malignancy workup    # Essential HTN  continue metoprolol 25 BID, Amlodipine 10 daily    # History of CVA  continue asa and statin therapies    # T2DM  A1c 5.1  blood sugar appears stable  continue accuchecks with correction insulin    # Bipolar Disorder  chronic condition  continue divalproex, trazodone    # Dementia  chronic condition  continue supportive care    # DVT prophylaxis  full dose enoxaparin                 70 female with history of past alcoholism, cva, htn, bipolar d/o, hld, t2dm, cad, anemia, korsakoff dementia presents with 20 lb unintentional weight loss, decreased appetite and was noted to be pale and weak the day of admission, also noted to have a fall after using the bathroom on 9/12.    # Symptomatic Anemia  cbc 6.2 yesterday after receiving one unit PRBCs  CBC today 6.7 - plan to transfuse another unit PRBCs  Follow up anemia workup - ferritin 176, iron level, b12 862, folate 10.4. LDH, haptoglobin, retic count 75.6  FOBT positive, follow up repeat  eventual colonoscopy (inpatient vs outpatient)  continue to monitor cbc    # KATIE  baseline Cr 0.4- at or near baseline s/p IVF  avoid nephrotoxins, monitor renal indices    # History of PE  continue full dose lovenox 60 BID    # Unintentional Weight Loss  CEA 4.4, CA 19-9 37, AFP 2.3  will likely require outpatient malignancy workup  palliative consult    # Essential HTN  continue metoprolol 25 BID, Amlodipine 10 daily    # History of CVA  continue asa and statin therapies    # T2DM  A1c 5.1  blood sugar appears stable  continue accuchecks with correction insulin    # Bipolar Disorder  chronic condition  continue divalproex, trazodone    # Dementia  chronic condition  continue supportive care    # DVT prophylaxis  full dose enoxaparin    # GOC  pt is full code  spoke with Madhu (brother) [phone] who was updated                 70 female with history of past alcoholism, cva, htn, bipolar d/o, hld, t2dm, cad, anemia, korsakoff dementia presents with 20 lb unintentional weight loss, decreased appetite and was noted to be pale and weak the day of admission, also noted to have a fall after using the bathroom on 9/12.    # Symptomatic Anemia  cbc 6.2 yesterday after receiving one unit PRBCs  CBC today 6.7 - plan to transfuse another unit PRBCs  Follow up anemia workup - ferritin 176, iron level, b12 862, folate 10.4. LDH, haptoglobin, retic count 75.6  FOBT positive, follow up repeat  eventual colonoscopy (inpatient vs outpatient)  continue to monitor cbc  will obtain GI evaluation    # KATIE  baseline Cr 0.4- at or near baseline s/p IVF  avoid nephrotoxins, monitor renal indices    # History of PE  continue full dose lovenox 60 BID    # Unintentional Weight Loss  CEA 4.4, CA 19-9 37, AFP 2.3  will likely require outpatient malignancy workup  palliative consult, follow up GI evaluation  consider CT a/p    # Essential HTN  continue metoprolol 25 BID, Amlodipine 10 daily    # History of CVA  continue asa and statin therapies    # T2DM  A1c 5.1  blood sugar appears stable  continue accuchecks with correction insulin    # Bipolar Disorder  chronic condition  continue divalproex, trazodone    # Dementia  chronic condition  continue supportive care    # DVT prophylaxis  full dose enoxaparin    # GOC  pt is full code  spoke with Madhu (brother) [phone] who was updated, he prefers workup be completed in the hospital if possible                 70 female with history of past alcoholism, cva, htn, bipolar d/o, hld, t2dm, cad, anemia, korsakoff dementia presents with 20 lb unintentional weight loss, decreased appetite and was noted to be pale and weak the day of admission, also noted to have a fall after using the bathroom on 9/12.    # Symptomatic Anemia  cbc 6.2 yesterday after receiving one unit PRBCs  CBC today 6.7 - plan to transfuse another unit PRBCs  Follow up anemia workup - ferritin 176, iron level, b12 862, folate 10.4. LDH, haptoglobin, retic count 75.6  FOBT negative, follow up repeat  eventual colonoscopy (inpatient vs outpatient)  continue to monitor cbc  will obtain GI evaluation    # KATIE  baseline Cr 0.4- at or near baseline s/p IVF  avoid nephrotoxins, monitor renal indices    # History of PE  continue full dose lovenox 60 BID    # Unintentional Weight Loss  CEA 4.4, CA 19-9 37, AFP 2.3  will likely require outpatient malignancy workup  palliative consult, follow up GI evaluation  consider CT a/p    # Essential HTN  continue metoprolol 25 BID, Amlodipine 10 daily    # History of CVA  continue asa and statin therapies    # T2DM  A1c 5.1  blood sugar appears stable  continue accuchecks with correction insulin    # Bipolar Disorder  chronic condition  continue divalproex, trazodone    # Dementia  chronic condition  continue supportive care    # DVT prophylaxis  full dose enoxaparin    # GOC  pt is full code  spoke with Madhu (brother) [phone] who was updated, he prefers workup be completed in the hospital if possible

## 2020-09-15 ENCOUNTER — TRANSCRIPTION ENCOUNTER (OUTPATIENT)
Age: 70
End: 2020-09-15

## 2020-09-15 LAB
ALBUMIN SERPL ELPH-MCNC: 2.1 G/DL — LOW (ref 3.3–5)
ALP SERPL-CCNC: 41 U/L — SIGNIFICANT CHANGE UP (ref 40–120)
ALT FLD-CCNC: 12 U/L — SIGNIFICANT CHANGE UP (ref 10–45)
ANION GAP SERPL CALC-SCNC: 8 MMOL/L — SIGNIFICANT CHANGE UP (ref 5–17)
AST SERPL-CCNC: 13 U/L — SIGNIFICANT CHANGE UP (ref 10–40)
BILIRUB SERPL-MCNC: 0.9 MG/DL — SIGNIFICANT CHANGE UP (ref 0.2–1.2)
BUN SERPL-MCNC: 12 MG/DL — SIGNIFICANT CHANGE UP (ref 7–23)
CALCIUM SERPL-MCNC: 8.5 MG/DL — SIGNIFICANT CHANGE UP (ref 8.4–10.5)
CHLORIDE SERPL-SCNC: 105 MMOL/L — SIGNIFICANT CHANGE UP (ref 96–108)
CO2 SERPL-SCNC: 27 MMOL/L — SIGNIFICANT CHANGE UP (ref 22–31)
CREAT SERPL-MCNC: 0.42 MG/DL — LOW (ref 0.5–1.3)
GLUCOSE BLDC GLUCOMTR-MCNC: 118 MG/DL — HIGH (ref 70–99)
GLUCOSE BLDC GLUCOMTR-MCNC: 133 MG/DL — HIGH (ref 70–99)
GLUCOSE BLDC GLUCOMTR-MCNC: 145 MG/DL — HIGH (ref 70–99)
GLUCOSE BLDC GLUCOMTR-MCNC: 211 MG/DL — HIGH (ref 70–99)
GLUCOSE SERPL-MCNC: 113 MG/DL — HIGH (ref 70–99)
HCT VFR BLD CALC: 22.6 % — LOW (ref 34.5–45)
HGB BLD-MCNC: 7.5 G/DL — LOW (ref 11.5–15.5)
MCHC RBC-ENTMCNC: 31.6 PG — SIGNIFICANT CHANGE UP (ref 27–34)
MCHC RBC-ENTMCNC: 33.2 GM/DL — SIGNIFICANT CHANGE UP (ref 32–36)
MCV RBC AUTO: 95.4 FL — SIGNIFICANT CHANGE UP (ref 80–100)
NRBC # BLD: 0 /100 WBCS — SIGNIFICANT CHANGE UP (ref 0–0)
PLATELET # BLD AUTO: 173 K/UL — SIGNIFICANT CHANGE UP (ref 150–400)
POTASSIUM SERPL-MCNC: 3.6 MMOL/L — SIGNIFICANT CHANGE UP (ref 3.5–5.3)
POTASSIUM SERPL-SCNC: 3.6 MMOL/L — SIGNIFICANT CHANGE UP (ref 3.5–5.3)
PROT SERPL-MCNC: 5.2 G/DL — LOW (ref 6–8.3)
RBC # BLD: 2.37 M/UL — LOW (ref 3.8–5.2)
RBC # FLD: 18.4 % — HIGH (ref 10.3–14.5)
SODIUM SERPL-SCNC: 140 MMOL/L — SIGNIFICANT CHANGE UP (ref 135–145)
WBC # BLD: 5.66 K/UL — SIGNIFICANT CHANGE UP (ref 3.8–10.5)
WBC # FLD AUTO: 5.66 K/UL — SIGNIFICANT CHANGE UP (ref 3.8–10.5)

## 2020-09-15 PROCEDURE — 99233 SBSQ HOSP IP/OBS HIGH 50: CPT

## 2020-09-15 RX ORDER — ACETAMINOPHEN 500 MG
650 TABLET ORAL EVERY 6 HOURS
Refills: 0 | Status: DISCONTINUED | OUTPATIENT
Start: 2020-09-15 | End: 2020-09-21

## 2020-09-15 RX ORDER — SOD SULF/SODIUM/NAHCO3/KCL/PEG
4000 SOLUTION, RECONSTITUTED, ORAL ORAL ONCE
Refills: 0 | Status: DISCONTINUED | OUTPATIENT
Start: 2020-09-15 | End: 2020-09-16

## 2020-09-15 RX ORDER — PANTOPRAZOLE SODIUM 20 MG/1
40 TABLET, DELAYED RELEASE ORAL
Refills: 0 | Status: DISCONTINUED | OUTPATIENT
Start: 2020-09-15 | End: 2020-09-21

## 2020-09-15 RX ORDER — POLYETHYLENE GLYCOL 3350 17 G/17G
17 POWDER, FOR SOLUTION ORAL ONCE
Refills: 0 | Status: COMPLETED | OUTPATIENT
Start: 2020-09-15 | End: 2020-09-15

## 2020-09-15 RX ADMIN — Medication 81 MILLIGRAM(S): at 11:09

## 2020-09-15 RX ADMIN — Medication 0.5 MILLIGRAM(S): at 17:57

## 2020-09-15 RX ADMIN — Medication 2: at 11:21

## 2020-09-15 RX ADMIN — Medication 25 MILLIGRAM(S): at 06:06

## 2020-09-15 RX ADMIN — Medication 200 MILLIGRAM(S): at 20:51

## 2020-09-15 RX ADMIN — Medication 650 MILLIGRAM(S): at 11:45

## 2020-09-15 RX ADMIN — Medication 6 MILLIGRAM(S): at 20:52

## 2020-09-15 RX ADMIN — Medication 5 MILLIGRAM(S): at 20:51

## 2020-09-15 RX ADMIN — Medication 100 MILLIGRAM(S): at 11:10

## 2020-09-15 RX ADMIN — DIVALPROEX SODIUM 500 MILLIGRAM(S): 500 TABLET, DELAYED RELEASE ORAL at 20:52

## 2020-09-15 RX ADMIN — POLYETHYLENE GLYCOL 3350 17 GRAM(S): 17 POWDER, FOR SOLUTION ORAL at 12:43

## 2020-09-15 RX ADMIN — AMLODIPINE BESYLATE 10 MILLIGRAM(S): 2.5 TABLET ORAL at 06:06

## 2020-09-15 RX ADMIN — Medication 25 MILLIGRAM(S): at 17:56

## 2020-09-15 RX ADMIN — Medication 650 MILLIGRAM(S): at 18:30

## 2020-09-15 RX ADMIN — ENOXAPARIN SODIUM 60 MILLIGRAM(S): 100 INJECTION SUBCUTANEOUS at 06:05

## 2020-09-15 RX ADMIN — Medication 0.5 MILLIGRAM(S): at 06:06

## 2020-09-15 RX ADMIN — ATORVASTATIN CALCIUM 80 MILLIGRAM(S): 80 TABLET, FILM COATED ORAL at 20:52

## 2020-09-15 RX ADMIN — Medication 650 MILLIGRAM(S): at 11:09

## 2020-09-15 RX ADMIN — Medication 650 MILLIGRAM(S): at 17:56

## 2020-09-15 NOTE — PROGRESS NOTE ADULT - SUBJECTIVE AND OBJECTIVE BOX
Patient is a 70y old  Female who presents with a chief complaint of weakness and fall (15 Sep 2020 09:50)    Patient seen and examined at bedside. No acute overnight events. Denies fever, chills, chest pain, shortness of breath. Patient is s/p 1u pRBC yesterday.     ALLERGIES:  No Known Allergies    MEDICATIONS  (STANDING):  amLODIPine   Tablet 10 milliGRAM(s) Oral daily  aspirin  chewable 81 milliGRAM(s) Oral daily  atorvastatin 80 milliGRAM(s) Oral at bedtime  dextrose 5%. 1000 milliLiter(s) (50 mL/Hr) IV Continuous <Continuous>  dextrose 50% Injectable 12.5 Gram(s) IV Push once  dextrose 50% Injectable 25 Gram(s) IV Push once  dextrose 50% Injectable 25 Gram(s) IV Push once  diVALproex  milliGRAM(s) Oral at bedtime  enoxaparin Injectable 60 milliGRAM(s) SubCutaneous every 12 hours  insulin lispro (HumaLOG) corrective regimen sliding scale   SubCutaneous three times a day before meals  insulin lispro (HumaLOG) corrective regimen sliding scale   SubCutaneous at bedtime  LORazepam     Tablet 0.5 milliGRAM(s) Oral two times a day  melatonin 6 milliGRAM(s) Oral at bedtime  metoprolol tartrate 25 milliGRAM(s) Oral two times a day  pantoprazole    Tablet 40 milliGRAM(s) Oral before breakfast  polyethylene glycol 3350 17 Gram(s) Oral once  thiamine 100 milliGRAM(s) Oral daily  traZODone 200 milliGRAM(s) Oral at bedtime    MEDICATIONS  (PRN):  acetaminophen   Tablet .. 650 milliGRAM(s) Oral every 6 hours PRN Mild Pain (1 - 3), Moderate Pain (4 - 6)  dextrose 40% Gel 15 Gram(s) Oral once PRN Blood Glucose LESS THAN 70 milliGRAM(s)/deciliter  glucagon  Injectable 1 milliGRAM(s) IntraMuscular once PRN Glucose LESS THAN 70 milligrams/deciliter    Vital Signs Last 24 Hrs  T(F): 98.5 (15 Sep 2020 11:37), Max: 98.5 (15 Sep 2020 11:37)  HR: 98 (15 Sep 2020 11:37) (84 - 98)  BP: 111/57 (15 Sep 2020 11:37) (97/52 - 122/61)  RR: 16 (15 Sep 2020 06:02) (16 - 18)  SpO2: 98% (15 Sep 2020 06:02) (97% - 100%)  I&O's Summary    14 Sep 2020 07:01  -  15 Sep 2020 07:00  --------------------------------------------------------  IN: 515 mL / OUT: 300 mL / NET: 215 mL    15 Sep 2020 07:01  -  15 Sep 2020 12:24  --------------------------------------------------------  IN: 240 mL / OUT: 0 mL / NET: 240 mL    BMI (kg/m2): 22.4 (20 @ 19:00)    PHYSICAL EXAM:  General: NAD, pale. comfortable. oriented x2  ENT: MMM, no tonsilar exudate  Neck: Supple, No JVD  Lungs: Clear to auscultation bilaterally, no wheezes. Good air entry bilaterally   Cardio: RRR, S1/S2, No murmurs  Abdomen: Soft, Nontender, Nondistended; Bowel sounds present  Extremities: No calf tenderness, No pitting edema    LABS:                        7.5    5.66  )-----------( 173      ( 15 Sep 2020 05:30 )             22.6       -15    140  |  105  |  12  ----------------------------<  113  3.6   |  27  |  0.42    Ca    8.5      15 Sep 2020 05:30    TPro  5.2  /  Alb  2.1  /  TBili  0.9  /  DBili  x   /  AST  13  /  ALT  12  /  AlkPhos  41  15     eGFR if Non African American: 105 mL/min/1.73M2 (09-15-20 @ 05:30)  eGFR if African American: 121 mL/min/1.73M2 (09-15-20 @ 05:30)    PT/INR - ( 12 Sep 2020 14:20 )   PT: 12.4 sec;   INR: 1.03 ratio      PTT - ( 12 Sep 2020 14:20 )  PTT:31.8 sec   Lactate, Blood: 1.2 mmol/L ( @ 09:20)  Lactate, Blood: 1.9 mmol/L ( @ 23:00)  Lactate, Blood: 1.5 mmol/L ( @ 19:45)  Lactate, Blood: 4.1 mmol/L ( @ 16:00)  Lactate, Blood: 4.2 mmol/L ( @ 14:20)    CARDIAC MARKERS ( 13 Sep 2020 09:20 )  <.017 ng/mL / x     / x     / x     / x      CARDIAC MARKERS ( 12 Sep 2020 23:00 )  <.017 ng/mL / x     / x     / x     / x      CARDIAC MARKERS ( 12 Sep 2020 19:45 )  <.017 ng/mL / x     / 29 U/L / x     / x        POCT Blood Glucose.: 211 mg/dL (15 Sep 2020 11:16)  POCT Blood Glucose.: 133 mg/dL (15 Sep 2020 07:59)  POCT Blood Glucose.: 155 mg/dL (14 Sep 2020 21:25)  POCT Blood Glucose.: 140 mg/dL (14 Sep 2020 16:22)    Urinalysis Basic - ( 12 Sep 2020 15:10 )    Color: Yellow / Appearance: Clear / S.025 / pH: x  Gluc: x / Ketone: Small  / Bili: Moderate / Urobili: 8   Blood: x / Protein: 100 / Nitrite: Negative   Leuk Esterase: Trace / RBC: 0-4 /HPF / WBC 3-5 /HPF   Sq Epi: x / Non Sq Epi: Neg.-Few / Bacteria: Few /HPF    Culture - Urine (collected 12 Sep 2020 15:10)  Source: .Urine Clean Catch (Midstream)  Final Report (14 Sep 2020 17:19):    >=3 organisms. Probable collection contamination.    Culture - Blood (collected 12 Sep 2020 14:20)  Source: .Blood Blood-Peripheral  Preliminary Report (13 Sep 2020 21:):    No growth to date.    Culture - Blood (collected 12 Sep 2020 14:20)  Source: .Blood Blood-Peripheral  Preliminary Report (13 Sep 2020 21:):    No growth to date.    RADIOLOGY & ADDITIONAL TESTS: EKG, CXR    Care Discussed with Consultants/Other Providers: Yes

## 2020-09-15 NOTE — PROGRESS NOTE ADULT - ASSESSMENT
70 female with history of past alcoholism, cva, htn, bipolar d/o, hld, t2dm, cad, anemia, korsakoff dementia presents with 20 lb unintentional weight loss, decreased appetite and was noted to be pale and weak the day of admission, also noted to have a fall after using the bathroom on 9/12.    # Symptomatic Anemia  CBC yesterday 6.7 - transfused 1u prbc yesterday. Hg today 7.6  Follow up anemia workup - ferritin 176, iron level, b12 862, folate 10.4. LDH, haptoglobin, retic count 75.6  FOBT negative, follow up repeat  eventual colonoscopy (inpatient vs outpatient)  continue to monitor cbc  GI eval appreciated    # KATIE  baseline Cr 0.4- at or near baseline s/p IVF  avoid nephrotoxins, monitor renal indices    # History of PE  continue full dose lovenox 60 BID    # Unintentional Weight Loss  CEA 4.4, CA 19-9 37, AFP 2.3  will likely require outpatient malignancy workup  palliative consult, follow up GI evaluation  consider CT a/p    # Essential HTN  continue metoprolol 25 BID, Amlodipine 10 daily    # History of CVA  continue asa and statin therapies    # T2DM  A1c 5.1  blood sugar appears stable  continue accuchecks with correction insulin    # Bipolar Disorder  chronic condition  continue divalproex, trazodone    # Dementia  chronic condition  continue supportive care    # DVT prophylaxis  full dose enoxaparin    # GOC  pt is full code  spoke with Madhu (brother) [phone] who was updated, he prefers workup be completed in the hospital if possible

## 2020-09-15 NOTE — CONSULT NOTE ADULT - ATTENDING COMMENTS
Patient seen and examined with LILO Baldwin.  Agree with assessment and plan as above.    Elderly female with multiple medical problems including alcoholism, bipolar disorder admitted weight loss, anemia.  CT imaging shows abnormality in stomach.  She does not have any complaints.  No melena or BRBPR.    VSS.  Abdomen soft, nontender  Guaiac negative stool    EGD for further evaluation of gastric abnormality seen on imaging

## 2020-09-16 ENCOUNTER — RESULT REVIEW (OUTPATIENT)
Age: 70
End: 2020-09-16

## 2020-09-16 LAB
ANION GAP SERPL CALC-SCNC: 6 MMOL/L — SIGNIFICANT CHANGE UP (ref 5–17)
BUN SERPL-MCNC: 7 MG/DL — SIGNIFICANT CHANGE UP (ref 7–23)
CALCIUM SERPL-MCNC: 8.7 MG/DL — SIGNIFICANT CHANGE UP (ref 8.4–10.5)
CHLORIDE SERPL-SCNC: 103 MMOL/L — SIGNIFICANT CHANGE UP (ref 96–108)
CO2 SERPL-SCNC: 30 MMOL/L — SIGNIFICANT CHANGE UP (ref 22–31)
CREAT SERPL-MCNC: 0.49 MG/DL — LOW (ref 0.5–1.3)
GLUCOSE BLDC GLUCOMTR-MCNC: 106 MG/DL — HIGH (ref 70–99)
GLUCOSE BLDC GLUCOMTR-MCNC: 113 MG/DL — HIGH (ref 70–99)
GLUCOSE BLDC GLUCOMTR-MCNC: 118 MG/DL — HIGH (ref 70–99)
GLUCOSE BLDC GLUCOMTR-MCNC: 197 MG/DL — HIGH (ref 70–99)
GLUCOSE SERPL-MCNC: 121 MG/DL — HIGH (ref 70–99)
HCT VFR BLD CALC: 23.7 % — LOW (ref 34.5–45)
HGB BLD-MCNC: 7.7 G/DL — LOW (ref 11.5–15.5)
MCHC RBC-ENTMCNC: 31.7 PG — SIGNIFICANT CHANGE UP (ref 27–34)
MCHC RBC-ENTMCNC: 32.5 GM/DL — SIGNIFICANT CHANGE UP (ref 32–36)
MCV RBC AUTO: 97.5 FL — SIGNIFICANT CHANGE UP (ref 80–100)
NRBC # BLD: 0 /100 WBCS — SIGNIFICANT CHANGE UP (ref 0–0)
PLATELET # BLD AUTO: 217 K/UL — SIGNIFICANT CHANGE UP (ref 150–400)
POTASSIUM SERPL-MCNC: 3.3 MMOL/L — LOW (ref 3.5–5.3)
POTASSIUM SERPL-SCNC: 3.3 MMOL/L — LOW (ref 3.5–5.3)
RBC # BLD: 2.43 M/UL — LOW (ref 3.8–5.2)
RBC # FLD: 18.5 % — HIGH (ref 10.3–14.5)
SODIUM SERPL-SCNC: 139 MMOL/L — SIGNIFICANT CHANGE UP (ref 135–145)
WBC # BLD: 6.96 K/UL — SIGNIFICANT CHANGE UP (ref 3.8–10.5)
WBC # FLD AUTO: 6.96 K/UL — SIGNIFICANT CHANGE UP (ref 3.8–10.5)

## 2020-09-16 PROCEDURE — 88305 TISSUE EXAM BY PATHOLOGIST: CPT | Mod: 26

## 2020-09-16 PROCEDURE — 88312 SPECIAL STAINS GROUP 1: CPT | Mod: 26

## 2020-09-16 PROCEDURE — 99233 SBSQ HOSP IP/OBS HIGH 50: CPT

## 2020-09-16 RX ORDER — SOD SULF/SODIUM/NAHCO3/KCL/PEG
4000 SOLUTION, RECONSTITUTED, ORAL ORAL ONCE
Refills: 0 | Status: COMPLETED | OUTPATIENT
Start: 2020-09-16 | End: 2020-09-16

## 2020-09-16 RX ORDER — POTASSIUM CHLORIDE 20 MEQ
10 PACKET (EA) ORAL
Refills: 0 | Status: COMPLETED | OUTPATIENT
Start: 2020-09-16 | End: 2020-09-16

## 2020-09-16 RX ADMIN — AMLODIPINE BESYLATE 10 MILLIGRAM(S): 2.5 TABLET ORAL at 05:45

## 2020-09-16 RX ADMIN — Medication 6 MILLIGRAM(S): at 21:40

## 2020-09-16 RX ADMIN — Medication 0.5 MILLIGRAM(S): at 05:45

## 2020-09-16 RX ADMIN — Medication 100 MILLIEQUIVALENT(S): at 17:39

## 2020-09-16 RX ADMIN — ENOXAPARIN SODIUM 60 MILLIGRAM(S): 100 INJECTION SUBCUTANEOUS at 17:39

## 2020-09-16 RX ADMIN — Medication 200 MILLIGRAM(S): at 21:40

## 2020-09-16 RX ADMIN — DIVALPROEX SODIUM 500 MILLIGRAM(S): 500 TABLET, DELAYED RELEASE ORAL at 21:40

## 2020-09-16 RX ADMIN — ATORVASTATIN CALCIUM 80 MILLIGRAM(S): 80 TABLET, FILM COATED ORAL at 21:40

## 2020-09-16 RX ADMIN — Medication 0.5 MILLIGRAM(S): at 17:39

## 2020-09-16 RX ADMIN — Medication 1: at 09:31

## 2020-09-16 RX ADMIN — Medication 100 MILLIEQUIVALENT(S): at 12:22

## 2020-09-16 NOTE — PROGRESS NOTE ADULT - ASSESSMENT
70 female with history of past alcoholism, cva, htn, bipolar d/o, hld, t2dm, cad, anemia, korsakoff dementia presents with 20 lb unintentional weight loss, decreased appetite and was noted to be pale and weak the day of admission, also noted to have a fall after using the bathroom on 9/12.    # Symptomatic Anemia  transfused 1u prbc 9/14. Hg today 7.7  Follow up anemia workup - ferritin 176, iron level, b12 862, folate 10.4. LDH, haptoglobin, retic count 75.6  FOBT negative, follow up repeat  GI recommendations appreciated  EGD today    # KATIE  baseline Cr 0.4- at or near baseline s/p IVF  avoid nephrotoxins, monitor renal indices    # History of PE  continue full dose lovenox 60 BID    # Unintentional Weight Loss  CEA 4.4, CA 19-9 37, AFP 2.3  will likely require outpatient malignancy workup  palliative consult, follow up GI evaluation  consider CT a/p    # Essential HTN  continue metoprolol 25 BID, Amlodipine 10 daily    # Subacute hematoma noted on CT Abdomen/Pelvis  Surgery consultation  Monitor H/H closely  Transfuse as needed to keep Hg>7    # History of CVA  continue asa and statin therapies    # T2DM  A1c 5.1  blood sugar appears stable  continue accuchecks with correction insulin    # Bipolar Disorder  chronic condition  continue divalproex, trazodone    # Dementia  chronic condition  continue supportive care    # DVT prophylaxis  full dose enoxaparin    # GOC  pt is full code  spoke with Madhu (brother) [phone] who was updated    70 female with history of past alcoholism, cva, htn, bipolar d/o, hld, t2dm, cad, anemia, korsakoff dementia presents with 20 lb unintentional weight loss, decreased appetite and was noted to be pale and weak the day of admission, also noted to have a fall after using the bathroom on 9/12.    # Symptomatic Anemia  transfused 1u prbc 9/14. Hg today 7.7  Follow up anemia workup - ferritin 176, iron level, b12 862, folate 10.4. LDH, haptoglobin, retic count 75.6  FOBT negative, follow up repeat  GI recommendations appreciated  EGD today    # KATIE  baseline Cr 0.4- at or near baseline s/p IVF  avoid nephrotoxins, monitor renal indices    # History of PE  continue full dose lovenox 60 BID  Failed Xarelto     # Unintentional Weight Loss  CEA 4.4, CA 19-9 37, AFP 2.3  will likely require outpatient malignancy workup  palliative consult, follow up GI evaluation  consider CT a/p    # Essential HTN  continue metoprolol 25 BID, Amlodipine 10 daily    # Subacute hematoma noted on CT Abdomen/Pelvis  Surgery consultation  Monitor H/H closely  Transfuse as needed to keep Hg>7    # History of CVA  continue asa and statin therapies    # T2DM  A1c 5.1  blood sugar appears stable  continue accuchecks with correction insulin    # Bipolar Disorder  chronic condition  continue divalproex, trazodone    # Dementia  chronic condition  continue supportive care    # DVT prophylaxis  full dose enoxaparin    # GOC  pt is full code  spoke with Madhu (brother) [phone] who was updated    70 female with history of past alcoholism, cva, htn, bipolar d/o, hld, t2dm, cad, anemia, korsakoff dementia presents with 20 lb unintentional weight loss, decreased appetite and was noted to be pale and weak the day of admission, also noted to have a fall after using the bathroom on 9/12.    # Symptomatic Anemia  transfused 1u prbc 9/14. Hg today 7.7  Follow up anemia workup - ferritin 176, iron level, b12 862, folate 10.4. LDH, haptoglobin, retic count 75.6  FOBT negative, follow up repeat  GI recommendations appreciated  EGD today. Will consider Colonoscopy as inpatient     # KATIE  baseline Cr 0.4- at or near baseline s/p IVF  avoid nephrotoxins, monitor renal indices    # History of PE  continue full dose lovenox 60 BID  Failed Xarelto     # Unintentional Weight Loss  CEA 4.4, CA 19-9 37, AFP 2.3  will likely require outpatient malignancy workup  palliative consult, follow up GI evaluation  consider CT a/p    # Essential HTN  continue metoprolol 25 BID, Amlodipine 10 daily    # Subacute hematoma noted on CT Abdomen/Pelvis  Surgery consultation  Monitor H/H closely  Transfuse as needed to keep Hg>7    # History of CVA  continue asa and statin therapies    # T2DM  A1c 5.1  blood sugar appears stable  continue accuchecks with correction insulin    # Bipolar Disorder  chronic condition  continue divalproex, trazodone    # Dementia  chronic condition  continue supportive care    # DVT prophylaxis  full dose enoxaparin    # GOC  pt is full code  spoke with Madhu (brother) [phone] who was updated

## 2020-09-16 NOTE — CONSULT NOTE ADULT - ASSESSMENT
A/P 70 year old with a history of past alcoholism, CVA, HTN, bipolar disease, HLD, diabetes, CAD, anemia, Korsakoff dementia comes with a 20 lb weight loss, increasing weakness, decreased appetite, and today was found on the floor by her toilet at her nursing facility [facility].   Due to patient's dementia unable to get further history. Currently admitted  in tele unit     Problem/Plan - 1:  ·  Problem: Fall.  Plan: unwitnessed fall  possible syncope  TTE ordered   Tele monitoring  trop trending  EKG serial.     Problem/Plan - 2:  ·  Problem: Lactic acidosis.  Plan: unclear source  not suspicious of infection  D-dimer low-unlikely PE  Creatinine kinase ordered     Problem/Plan - 3:  ·  Problem: Bipolar 1 disorder.  Plan: home medications.     Problem/Plan - 4:  ·  Problem: Pulmonary emboli.  Plan: Past PE that was resistant to xarelto  On bid Lovenox   current ddimer <150.     Problem/Plan - 5:  ·  Problem: HTN (hypertension).  Plan: home medications.     Problem/Plan - 6:  Problem: Diabetes. Plan: at home not on medication  FS and coverage ordered.    Problem/Plan - 7:  ·  Problem: Weight loss.  Plan: According to brother lost 20 lbs in last few months  Patient needs colonoscopy, to consult GI.     Problem/Plan - 8:  ·  Problem: Anemia.  Plan: macrocytic anemia  guiac negative  should have colonscopy  folate and b12 levels ordered.     Palliative :  asked for goc, case reviewed w/ med team today and chart reviewed. Pt is a resident at [facility]  . I met pt bedside, she is alert, converses, but not oriented to date or time. She looked comfortable, and she denied pain, has no sob.  Reports she did eat today. I called and spoke to her brother Madhu, he is her legal guardian and able to make all decisions for the pt.  We discussed her pmhx, some previous hospitalizations , and her current clinical condition, he says he was also updated by medical team today. We discussed pts advanced directives, we reviewed CPR and intubation, brother states he knows her wishes would be to have cpr performed. I discussed with him that her dementia will not improve but only continue to progress, and intubating may cause even further cognitive decline. He says he understood but believes these are her wishes. I explained that this conversation can always be re-visited in the future and when things change with his sister . He agreed but said for now, he wants full code. He has my contact info for any questions.  Recs pending pts clinical course.    
70 year old Female (poor historian) with a PMHx of alcoholism, CVA, HTN, bipolar disease, HLD, diabetes, CAD, anemia, Korsakoff dementia, and PE on lovenox who presented to the ED on 9/12 s/p fall by her toilet at [facility].  She also has had a 20 lb weight loss over the past few months, with increasing weakness and decreased appetite.  In the ED she was found to be anemic and her lovenox dose was noted to be supratherapeutic based on her current weight.  Her FOBT was negative and she was transfused 1u PRBC on 9/13 and an additional 1u PRBC on 9/14 with appropriate response in H/H.  An oncologic workup was also sent based on her weight loss which revealed a slightly elevated CA-19 and CEA.  A CT of the abdomen showed Wall thickening gastric antrum which may represent antritis.   - FOBT negative in ED, recommend repeating   - cont to trend H/H daily and transfuse as needed   - recommend PPI   - will d/w Dr. Rich regarding possible EGD to evaluate gastric antrum thickening   - c/w lovenox as per primary team, but would consider holding if bleeding continues
70 year old with a history of past alcoholism, CVA, HTN, bipolar disease, HLD, diabetes, CAD, anemia, Korsakoff dementia comes with a 20 lb weight loss, increasing weakness, decreased appetite, admitted after fall at [facility].   Patient hit left side of body.   Evaluation of Left lateral  chest hematoma.    Imp:  Anemia of acute blood loss           CVA, htn, bipolar, Hld DM2, CAD,           Korsakoff dementia     Plan:   W/u as per medicine            consultants input appreciated             Continue to monitor CBC             Discussed with Dr Abraham ? holding her therapeutic Lovenox -  discussed with hospitalist risk vs benefit            will continue Lovenox for now.            Check CbC in am and monitor size or change of hematoma in am

## 2020-09-16 NOTE — PROGRESS NOTE ADULT - SUBJECTIVE AND OBJECTIVE BOX
Patient is a 70y old  Female who presents with a chief complaint of weakness and fall (15 Sep 2020 12:24)    Patient seen and examined at bedside. No acute overnight events. Denies fever, chills, chest pain, shortness of breath.     ALLERGIES:  No Known Allergies    MEDICATIONS  (STANDING):  amLODIPine   Tablet 10 milliGRAM(s) Oral daily  aspirin  chewable 81 milliGRAM(s) Oral daily  atorvastatin 80 milliGRAM(s) Oral at bedtime  bisacodyl 5 milliGRAM(s) Oral at bedtime  dextrose 5%. 1000 milliLiter(s) (50 mL/Hr) IV Continuous <Continuous>  dextrose 50% Injectable 12.5 Gram(s) IV Push once  dextrose 50% Injectable 25 Gram(s) IV Push once  dextrose 50% Injectable 25 Gram(s) IV Push once  diVALproex  milliGRAM(s) Oral at bedtime  enoxaparin Injectable 60 milliGRAM(s) SubCutaneous every 12 hours  insulin lispro (HumaLOG) corrective regimen sliding scale   SubCutaneous three times a day before meals  insulin lispro (HumaLOG) corrective regimen sliding scale   SubCutaneous at bedtime  LORazepam     Tablet 0.5 milliGRAM(s) Oral two times a day  melatonin 6 milliGRAM(s) Oral at bedtime  metoprolol tartrate 25 milliGRAM(s) Oral two times a day  pantoprazole    Tablet 40 milliGRAM(s) Oral before breakfast  polyethylene glycol/electrolyte Solution. 4000 milliLiter(s) Oral once  potassium chloride  10 mEq/100 mL IVPB 10 milliEquivalent(s) IV Intermittent every 1 hour  thiamine 100 milliGRAM(s) Oral daily  traZODone 200 milliGRAM(s) Oral at bedtime    MEDICATIONS  (PRN):  acetaminophen   Tablet .. 650 milliGRAM(s) Oral every 6 hours PRN Mild Pain (1 - 3), Moderate Pain (4 - 6)  dextrose 40% Gel 15 Gram(s) Oral once PRN Blood Glucose LESS THAN 70 milliGRAM(s)/deciliter  glucagon  Injectable 1 milliGRAM(s) IntraMuscular once PRN Glucose LESS THAN 70 milligrams/deciliter    Vital Signs Last 24 Hrs  T(F): 98.4 (16 Sep 2020 13:05), Max: 98.4 (16 Sep 2020 11:34)  HR: 95 (16 Sep 2020 13:05) (87 - 102)  BP: 126/66 (16 Sep 2020 13:05) (100/74 - 126/66)  RR: 16 (16 Sep 2020 13:05) (16 - 19)  SpO2: 98% (16 Sep 2020 13:05) (98% - 100%)  I&O's Summary    15 Sep 2020 07:01  -  16 Sep 2020 07:00  --------------------------------------------------------  IN: 660 mL / OUT: 0 mL / NET: 660 mL    BMI (kg/m2): 22.4 (09-16-20 @ 13:20)    PHYSICAL EXAM:  General: NAD, A/O x 2  ENT: MMM, no tonsilar exudate  Neck: Supple, No JVD  Lungs: Clear to auscultation bilaterally, no wheezes. Good air entry bilaterally   Cardio: RRR, S1/S2, No murmurs  Abdomen: Soft, Nontender, Nondistended; Bowel sounds present  Extremities: No calf tenderness, No pitting edema    LABS:                        7.7    6.96  )-----------( 217      ( 16 Sep 2020 09:00 )             23.7       09-16    139  |  103  |  7   ----------------------------<  121  3.3   |  30  |  0.49    Ca    8.7      16 Sep 2020 09:00    TPro  5.2  /  Alb  2.1  /  TBili  0.9  /  DBili  x   /  AST  13  /  ALT  12  /  AlkPhos  41  09-15     eGFR if Non African American: 99 mL/min/1.73M2 (09-16-20 @ 09:00)  eGFR if : 115 mL/min/1.73M2 (09-16-20 @ 09:00)    POCT Blood Glucose.: 113 mg/dL (16 Sep 2020 12:56)  POCT Blood Glucose.: 197 mg/dL (16 Sep 2020 09:23)  POCT Blood Glucose.: 145 mg/dL (15 Sep 2020 20:57)  POCT Blood Glucose.: 118 mg/dL (15 Sep 2020 17:42)    Culture - Urine (collected 12 Sep 2020 15:10)  Source: .Urine Clean Catch (Midstream)  Final Report (14 Sep 2020 17:19):    >=3 organisms. Probable collection contamination.    Culture - Blood (collected 12 Sep 2020 14:20)  Source: .Blood Blood-Peripheral  Preliminary Report (13 Sep 2020 21:01):    No growth to date.    Culture - Blood (collected 12 Sep 2020 14:20)  Source: .Blood Blood-Peripheral  Preliminary Report (13 Sep 2020 21:01):    No growth to date.        RADIOLOGY & ADDITIONAL TESTS: EKG, CXR    Care Discussed with Consultants/Other Providers: Yes

## 2020-09-16 NOTE — PROGRESS NOTE ADULT - SUBJECTIVE AND OBJECTIVE BOX
Patient discussed on morning rounds with Dr. Rich    SUBJECTIVE ASSESSMENT:    Pt denies abdominal pain, nausea.vomitting, dizziness/lightheadedness, melena or BRBPR    Vital Signs Last 24 Hrs  T(C): 36.9 (16 Sep 2020 13:05), Max: 36.9 (16 Sep 2020 11:34)  T(F): 98.4 (16 Sep 2020 13:05), Max: 98.4 (16 Sep 2020 11:34)  HR: 95 (16 Sep 2020 13:05) (87 - 102)  BP: 126/66 (16 Sep 2020 13:05) (100/74 - 126/66)  BP(mean): --  RR: 16 (16 Sep 2020 13:05) (16 - 19)  SpO2: 98% (16 Sep 2020 13:05) (98% - 100%)  I&O's Detail    15 Sep 2020 07:01  -  16 Sep 2020 07:00  --------------------------------------------------------  IN:    Oral Fluid: 660 mL  Total IN: 660 mL    OUT:  Total OUT: 0 mL    Total NET: 660 mL    PHYSICAL EXAM:    General: NAD  Neurological: A&Ox3  Cardiovascular: s1S2 RRR  Respiratory: CTA b/l no W/R/R  Gastrointestinal: soft NT/ND +BS  Extremities: no edema  Vascular: warm and well perfused bilaterally    LABS:                        7.7    6.96  )-----------( 217      ( 16 Sep 2020 09:00 )             23.7       09-16    139  |  103  |  7   ----------------------------<  121<H>  3.3<L>   |  30  |  0.49<L>    Ca    8.7      16 Sep 2020 09:00    TPro  5.2<L>  /  Alb  2.1<L>  /  TBili  0.9  /  DBili  x   /  AST  13  /  ALT  12  /  AlkPhos  41  09-15    MEDICATIONS  (STANDING):  amLODIPine   Tablet 10 milliGRAM(s) Oral daily  aspirin  chewable 81 milliGRAM(s) Oral daily  atorvastatin 80 milliGRAM(s) Oral at bedtime  bisacodyl 5 milliGRAM(s) Oral at bedtime  dextrose 5%. 1000 milliLiter(s) (50 mL/Hr) IV Continuous <Continuous>  dextrose 50% Injectable 12.5 Gram(s) IV Push once  dextrose 50% Injectable 25 Gram(s) IV Push once  dextrose 50% Injectable 25 Gram(s) IV Push once  diVALproex  milliGRAM(s) Oral at bedtime  enoxaparin Injectable 60 milliGRAM(s) SubCutaneous every 12 hours  insulin lispro (HumaLOG) corrective regimen sliding scale   SubCutaneous three times a day before meals  insulin lispro (HumaLOG) corrective regimen sliding scale   SubCutaneous at bedtime  LORazepam     Tablet 0.5 milliGRAM(s) Oral two times a day  melatonin 6 milliGRAM(s) Oral at bedtime  metoprolol tartrate 25 milliGRAM(s) Oral two times a day  pantoprazole    Tablet 40 milliGRAM(s) Oral before breakfast  polyethylene glycol/electrolyte Solution. 4000 milliLiter(s) Oral once  potassium chloride  10 mEq/100 mL IVPB 10 milliEquivalent(s) IV Intermittent every 1 hour  thiamine 100 milliGRAM(s) Oral daily  traZODone 200 milliGRAM(s) Oral at bedtime    MEDICATIONS  (PRN):  acetaminophen   Tablet .. 650 milliGRAM(s) Oral every 6 hours PRN Mild Pain (1 - 3), Moderate Pain (4 - 6)  dextrose 40% Gel 15 Gram(s) Oral once PRN Blood Glucose LESS THAN 70 milliGRAM(s)/deciliter  glucagon  Injectable 1 milliGRAM(s) IntraMuscular once PRN Glucose LESS THAN 70 milligrams/deciliter

## 2020-09-16 NOTE — PROGRESS NOTE ADULT - ASSESSMENT
70 year old Female (poor historian) with a PMHx of alcoholism, CVA, HTN, bipolar disease, HLD, diabetes, CAD, anemia, Korsakoff dementia, and PE on lovenox who presented to the ED on 9/12 s/p fall by her toilet at [facility].  She also has had a 20 lb weight loss over the past few months, with increasing weakness and decreased appetite.  In the ED she was found to be anemic and her lovenox dose was noted to be supratherapeutic based on her current weight.  Her FOBT was negative and she was transfused 1u PRBC on 9/13 and an additional 1u PRBC on 9/14 with appropriate response in H/H.  An oncologic workup was also sent based on her weight loss which revealed a slightly elevated CA-19 and CEA.  A CT of the abdomen showed Wall thickening gastric antrum which may represent antritis.   - cont to trend H/H daily and transfuse as needed   - recommend PPI   - s/p EGD today just showing gastritis   - insufficient bowel prep for colonoscopy, will book procedure for Friday and re-attempt bowel prep tomorrow   - c/w lovenox as per primary team, but would consider holding if bleeding continues

## 2020-09-16 NOTE — PROVIDER CONTACT NOTE (OTHER) - NAME OF MD/NP/PA/DO NOTIFIED:
Progress Notes by Jerry Malave MD at 05/07/18 03:17 PM     Author:  Jerry Malave MD Service:  (none) Author Type:  Physician     Filed:  05/07/18 03:37 PM Encounter Date:  5/7/2018 Status:  Signed     :  Jerry Malave MD (Physician)            Roomed by: Viji Sands CMA  last visit[SC1.1T] 4/8/18[SC1.1M]    SUBJECTIVE:  Current outpatient prescriptions       Medication  Sig Dispense Refill   • Multiple Vitamins-Minerals (CENTRUM SILVER 50+WOMEN) TABS Take 1 Tab by mouth daily.     • Fish Oil-Cholecalciferol (FISH OIL + D3) 5831-9204 MG-UNIT CAPS Take 2 Caps by mouth daily.     • alendronate (FOSAMAX) 70 MG tablet Take 70 mg by mouth once a week. Take 1 tablet once weekly.  Take 1st thing in AM w/ full glass of water.  Do not eat or lie down for 30 min.     • Cetirizine HCl (ZYRTEC OR) Take  by mouth.     • Multiple Vitamins-Minerals (ZINC OR) Take  by mouth.     • ECHINACEA      • Probiotic Product (PROBIOTIC OR) Take  by mouth.     • Omega-3 Fatty Acids (FISH OIL OR) Take  by mouth.     • Vitamin Mixture (RUDI-C OR) Take  by mouth.     • fluocinolone (SYNALAR) 0.025 % ointment Use tid for 2 weeks then vaseline 60 g 1   • atorvastatin (LIPITOR) 10 MG tablet Take 10 mg by mouth daily.  10   • REMICADE IV every 8 weeks       Patient Active Problem List    Diagnosis    • Irritant contact dermatitis due to cosmetics     ROS:  --General: In good health   Ibuprofen      Irene Garcia is an 76 year old female who presents for[SC1.1C] follow up lesion on[SC1.1M]The left hand 5th digit.      Problem # 1: LESION   SUBJECTIVE: It has been present for several years and is stable - worsened  after treated as a wart with ointments, shots and bx reported as not cancer .  Previous treatments: liquid nitrogen.  Risk factors: no significant problems noted.  Symptoms: increasing thickness, tendency to be traumatized[SC1.1C]  Now using Aldara with improvement qod[FK1.1M]  ROS:  --General: In good health  --Heme: denies bleeding  Lucho NAGY problems, anemia, blood clots, thrombophlebitis, pulmonary embolsim, stasis dermatitis, lupus erythematosis  [SC1.1C]    Above phote Done in April 9  Below photo done[FK1.1M] 5/7/2018[FK1.2T]     [FK1.1M]          OBJECTIVE: KERATOSIS - Number: 1  Location: left 5th nail  Size: 12x19  mms  Description:[SC1.1C] less[FK1.1M] hyperkeralitic, scaly and sharply marginated[SC1.1C]  bx from Dr bella=wart[FK1.1M]  seborrheic keratosis flesh colored, evenly pigmented, symmetric, sharply marginated[FK1.1T] on neck-new problem  No problems[FK1.1M]  ASSESSMENT: warts   PLAN: --[SC1.1C]Aldara increased to daily  Back 6 weeks[FK1.1M]  Electronically Signed by:    Jerry Malave MD , 5/7/2018[FK1.3T]                    Revision History        User Key Date/Time User Provider Type Action    > FK1.3 05/07/18 03:37 PM Jerry Malave MD Physician Sign     FK1.2 05/07/18 03:28 PM Jerry Malave MD Physician      FK1.1 05/07/18 03:25 PM Jerry Malave MD Physician      SC1.1 05/07/18 03:22 PM Viji Sands CMA Registered Nurse Sign at close encounter    C - Copied, M - Manual, T - Template

## 2020-09-16 NOTE — CONSULT NOTE ADULT - SUBJECTIVE AND OBJECTIVE BOX
HPI:  70 year old with a history of past alcoholism, CVA, HTN, bipolar disease, HLD, diabetes, CAD, anemia, Korsakoff dementia comes with a 20 lb weight loss, increasing weakness, decreased appetite, and  was found on the floor by her toilet at her nursing facility [facility].   Due to patient's dementia unable to get further history.        PAST MEDICAL & SURGICAL HISTORY:  Diabetes type 2, controlled    Hyperlipidemia    Hypertension    Aphasia    Dementia    Bipolar 1 disorder    Dementia    Insomnia    Cerebral infarction    Atherosclerotic cardiovascular disease    Alcohol dependence    Alcohol dependence    Hyperlipemia    HTN (hypertension)    Depression    No significant past surgical history    Depression    Hyperlipemia    HTN (hypertension)        SOCIAL HISTORY:    Admitted from: [facility]   Substance abuse history:       yes       Tobacco hx:                  Alcohol hx:   yes           Home Opioid hx:  Mosque:                                    Preferred Language:    Surrogate/HCP/Guardian:      Brother Madhu       Phone#:  [phone]    FAMILY HISTORY:    Baseline ADLs (prior to admission):    Allergies    No Known Allergies    Intolerances      Present Symptoms:   Dyspnea:   Nausea/Vomiting:   Anxiety:  Depressed   Fatigue:  Loss of appetite:   Pain:       no                         location:          Review of Systems:  Unable to obtain due to poor mentation]    MEDICATIONS  (STANDING):  amLODIPine   Tablet 10 milliGRAM(s) Oral daily  aspirin  chewable 81 milliGRAM(s) Oral daily  atorvastatin 80 milliGRAM(s) Oral at bedtime  dextrose 5%. 1000 milliLiter(s) (50 mL/Hr) IV Continuous <Continuous>  dextrose 50% Injectable 12.5 Gram(s) IV Push once  dextrose 50% Injectable 25 Gram(s) IV Push once  dextrose 50% Injectable 25 Gram(s) IV Push once  diVALproex  milliGRAM(s) Oral at bedtime  enoxaparin Injectable 60 milliGRAM(s) SubCutaneous every 12 hours  insulin lispro (HumaLOG) corrective regimen sliding scale   SubCutaneous three times a day before meals  insulin lispro (HumaLOG) corrective regimen sliding scale   SubCutaneous at bedtime  LORazepam     Tablet 0.5 milliGRAM(s) Oral two times a day  melatonin 6 milliGRAM(s) Oral at bedtime  metoprolol tartrate 25 milliGRAM(s) Oral two times a day  thiamine 100 milliGRAM(s) Oral daily  traZODone 200 milliGRAM(s) Oral at bedtime    MEDICATIONS  (PRN):  dextrose 40% Gel 15 Gram(s) Oral once PRN Blood Glucose LESS THAN 70 milliGRAM(s)/deciliter  glucagon  Injectable 1 milliGRAM(s) IntraMuscular once PRN Glucose LESS THAN 70 milligrams/deciliter      PHYSICAL EXAM:    Vital Signs Last 24 Hrs  T(C): 36.8 (14 Sep 2020 13:00), Max: 36.8 (14 Sep 2020 13:00)  T(F): 98.3 (14 Sep 2020 13:00), Max: 98.3 (14 Sep 2020 13:00)  HR: 89 (14 Sep 2020 13:00) (78 - 89)  BP: 118/60 (14 Sep 2020 13:00) (99/60 - 118/60)  BP(mean): --  RR: 16 (14 Sep 2020 13:00) (14 - 16)  SpO2: 99% (14 Sep 2020 13:00) (97% - 100%)    General: alert  oriented x 1-2  verbal]  Karnofsky Performance Score/Palliative Performance Status Version2: 50    %  HEENT: normal , n/c, a/t , dry mouth   Lungs: dim to bases   CV: s1s2 normal    GI: normal , soft, n/t    : normal   walker  Musculoskeletal: normal  w/weakness no  edema   Skin: normal, w/d   Neuro: pt is alert, oriented to self, place, not month or year  Oral intake ability:  moderate full capability]  Diet: reg as cherise     LABS:                        6.7    6.71  )-----------( 149      ( 14 Sep 2020 07:00 )             20.5     09-13    139  |  106  |  17  ----------------------------<  121<H>  4.6   |  28  |  0.56    Ca    8.3<L>      13 Sep 2020 09:20    TPro  5.4<L>  /  Alb  2.4<L>  /  TBili  0.7  /  DBili  x   /  AST  18  /  ALT  16  /  AlkPhos  33<L>  0913    Urinalysis Basic - ( 12 Sep 2020 15:10 )    Color: Yellow / Appearance: Clear / S.025 / pH: x  Gluc: x / Ketone: Small  / Bili: Moderate / Urobili: 8   Blood: x / Protein: 100 / Nitrite: Negative   Leuk Esterase: Trace / RBC: 0-4 /HPF / WBC 3-5 /HPF   Sq Epi: x / Non Sq Epi: Neg.-Few / Bacteria: Few /HPF        RADIOLOGY & ADDITIONAL STUDIES:< from: CT Head No Cont (20 @ 15:05) >    EXAM:  CT BRAIN      PROCEDURE DATE:  2020        INTERPRETATION:  HISTORY: Injury  Comparison 19  Evaluation demonstrates no evidence of mass-effect or midline shift. No intraparenchymal mass lesions or hemorrhage is identified. There is generalized age typical  atrophy and chronic white matter degeneration.  There is no evidence of hydrocephalus. No extra-axial collections are noted.    The bone windows demonstrate no gross osseous abnormalities.    Impression:  1. Unremarkable noncontrast CT scan of the brain.        ADVANCE DIRECTIVES: MOLST   Full code  has legal guardian    Advanced Care Planning discussion total time spent:
  70 year old with a history of past alcoholism, CVA, HTN, bipolar disease, HLD, diabetes, CAD, anemia, Korsakoff dementia comes with a 20 lb weight loss, increasing weakness, decreased appetite, and today was found on the floor by her toilet at her nursing facility [facility].   Due to patient's dementia unable to get further history.  (12 Sep 2020 17:31)  Patient was admitted and is being  w/u .   Dr Abraham consulted for evaluation of Left Lateral chest/abdomen  hematoma.   Patient was seen and examined by me .   Patient unable to give any accurate hx .  She c/o pain on lateral chest and abdomen.    Vital Signs Last 24 Hrs  T(C): 36.8 (16 Sep 2020 16:27), Max: 36.9 (16 Sep 2020 11:34)  T(F): 98.3 (16 Sep 2020 16:27), Max: 98.4 (16 Sep 2020 11:34)  HR: 89 (16 Sep 2020 16:27) (87 - 102)  BP: 97/47 (16 Sep 2020 16:27) (97/47 - 126/66)  BP(mean): --  RR: 15 (16 Sep 2020 16:27) (15 - 19)  SpO2: 99% (16 Sep 2020 16:27) (98% - 100%)    PAST MEDICAL & SURGICAL HISTORY:  Diabetes type 2, controlled  Hyperlipidemia  Hypertension  Aphasia  Dementia  Bipolar 1 disorder  Dementia  Insomnia  Cerebral infarction  Atherosclerotic cardiovascular disease  Alcohol dependence  Hyperlipemia  HTN (hypertension)  Depression  No significant past surgical history  hx of PE     Labs:                          7.7    6.96  )-----------( 217      ( 16 Sep 2020 09:00 )             23.7   09-16    139  |  103  |  7   ----------------------------<  121<H>  3.3<L>   |  30  |  0.49<L>    Ca    8.7      16 Sep 2020 09:00    TPro  5.2<L>  /  Alb  2.1<L>  /  TBili  0.9  /  DBili  x   /  AST  13  /  ALT  12  /  AlkPhos  41  09-15    PE:  Lethargic and not oriented   Lungs:  cta  cor:  RR S1S2  Chest:  Lateral aspect of chest ecchymotic extending to spine  but soft to touch.             + tenderness on palpation of left lateral abdomen but less ecchymosis .                 Does not appear to be expanding further .  Abd:  soft, non tender + tenderness on lateral aspect   Ext:  w/o edema                              
HISTORY OF PRESENT ILLNESS (Need 4):    70 year old Female (poor historian) with a PMHx of alcoholism, CVA, HTN, bipolar disease, HLD, diabetes, CAD, anemia, Korsakoff dementia, and PE on lovenox who presented to the ED on 9/12 s/p fall by her toilet at [facility].  She also has had a 20 lb weight loss over the past few months, with increasing weakness and decreased appetite.  In the ED she was found to be anemic and her lovenox dose was noted to be supratherapeutic based on her current weight.  Her FOBT was negative and she was transfused 1u PRBC on 9/13 and an additional 1u PRBC on 9/14 with appropriate response in H/H.  An oncologic workup was also sent based on her weight loss which revealed a slightly elevated CA-19 and CEA.  A CT of the abdomen showed Wall thickening gastric antrum which may represent antritis.    PAST MEDICAL & SURGICAL HISTORY:  Diabetes type 2, controlled  Hyperlipidemia  Hypertension  Aphasia  Dementia  Bipolar 1 disorder  Dementia  Insomnia  Cerebral infarction  Atherosclerotic cardiovascular disease  Alcohol dependence  Depression    No significant past surgical history    MEDICATIONS  (STANDING):  amLODIPine   Tablet 10 milliGRAM(s) Oral daily  aspirin  chewable 81 milliGRAM(s) Oral daily  atorvastatin 80 milliGRAM(s) Oral at bedtime  dextrose 5%. 1000 milliLiter(s) (50 mL/Hr) IV Continuous <Continuous>  dextrose 50% Injectable 12.5 Gram(s) IV Push once  dextrose 50% Injectable 25 Gram(s) IV Push once  dextrose 50% Injectable 25 Gram(s) IV Push once  diVALproex  milliGRAM(s) Oral at bedtime  enoxaparin Injectable 60 milliGRAM(s) SubCutaneous every 12 hours  insulin lispro (HumaLOG) corrective regimen sliding scale   SubCutaneous three times a day before meals  insulin lispro (HumaLOG) corrective regimen sliding scale   SubCutaneous at bedtime  LORazepam     Tablet 0.5 milliGRAM(s) Oral two times a day  melatonin 6 milliGRAM(s) Oral at bedtime  metoprolol tartrate 25 milliGRAM(s) Oral two times a day  thiamine 100 milliGRAM(s) Oral daily  traZODone 200 milliGRAM(s) Oral at bedtime    MEDICATIONS  (PRN):  dextrose 40% Gel 15 Gram(s) Oral once PRN Blood Glucose LESS THAN 70 milliGRAM(s)/deciliter  glucagon  Injectable 1 milliGRAM(s) IntraMuscular once PRN Glucose LESS THAN 70 milligrams/deciliter      Allergies    No Known Allergies    Intolerances    SOCIAL HISTORY:  Smoker:   no  ETOH use:  NO (distant history of alcoholism as per HPI)       Ilicit Drug use:   NO  Live [facility]: [facility]    FAMILY HISTORY:    Review of Systems (Need 10):  CONSTITUTIONAL: Denies fevers / chills, sweats, fatigue, weight loss, weight gain                                       NEURO:  Denies parathesias, seizures, syncope, confusion                                                                                  EYES:  Denies blurry vision, discharge, pain, loss of vision                                                                                    ENMT:  Denies difficulty hearing, vertigo, dysphagia, epistaxis, recent dental work                                       CV:  Denies chest pain, palpitations, MARTINEZ, orthopnea                                                                                           RESPIRATORY:  Denies wWheezing, SOB, cough / sputum, hemoptysis                                                               GI:  Denies nausea, vomiting, diarrhea, constipation, melena                                                                          : Denies hematuria, dysuria, urgency, incontinence                                                                                          MUSKULOSKELETAL:  Denies arthritis, joint swelling, muscle weakness                                                             SKIN/BREAST:  Denies rash, itching, hair loss, masses                                                                                              PSYCH:  Denies depression, anxiety, suicidal ideation                                                                                                HEME/LYMPH:  Denies bruises easily, enlarged lymph nodes, tender lymph nodes                                          ENDOCRINE:  Denies cold intolerance, heat intolerance, polydipsia                                                                      Vital Signs Last 24 Hrs  T(C): 36.6 (15 Sep 2020 06:02), Max: 36.8 (14 Sep 2020 13:00)  T(F): 97.9 (15 Sep 2020 06:02), Max: 98.3 (14 Sep 2020 13:00)  HR: 89 (15 Sep 2020 06:02) (84 - 89)  BP: 122/61 (15 Sep 2020 06:02) (97/52 - 122/61)  BP(mean): --  RR: 16 (15 Sep 2020 06:02) (16 - 18)  SpO2: 98% (15 Sep 2020 06:02) (97% - 100%)    Physical Exam (Need 8)  General: NAD  Neurological: A&Ox2  Cardiovascular: s1S2 RRR  Respiratory: CTA b/l no W/R/R  Gastrointestinal: soft NT/ND +BS  Extremities: no edema  Vascular: warm and well perfused bilaterally                                                          LABS:                        7.5    5.66  )-----------( 173      ( 15 Sep 2020 05:30 )             22.6     09-15    140  |  105  |  12  ----------------------------<  113<H>  3.6   |  27  |  0.42<L>    Ca    8.5      15 Sep 2020 05:30    TPro  5.2<L>  /  Alb  2.1<L>  /  TBili  0.9  /  DBili  x   /  AST  13  /  ALT  12  /  AlkPhos  41  09-15    RADIOLOGY & ADDITIONAL STUDIES:    CT Scan: gastric antrum thickening

## 2020-09-16 NOTE — PRE-OP CHECKLIST - BP NONINVASIVE DIASTOLIC (MM HG)
"----- Message from Leatha North sent at 12/1/2017  3:01 PM CST -----  Contact: patient- [phone]  RX request - refill or new RX.  Is this a refill or new RX:  refill  RX name and strength: lisdexamfetamine (VYVANSE) 20 MG capsule  Directions:   Is this a 30 day or 90 day RX:    Pharmacy name and phone # (DON'T enter "on file" or "in chart"): [facility] [phone] (Phone)  [phone] (Fax)  Comments:  Need prior authorization         "
66

## 2020-09-17 LAB
ANION GAP SERPL CALC-SCNC: 7 MMOL/L — SIGNIFICANT CHANGE UP (ref 5–17)
BUN SERPL-MCNC: 6 MG/DL — LOW (ref 7–23)
CALCIUM SERPL-MCNC: 8.6 MG/DL — SIGNIFICANT CHANGE UP (ref 8.4–10.5)
CHLORIDE SERPL-SCNC: 103 MMOL/L — SIGNIFICANT CHANGE UP (ref 96–108)
CO2 SERPL-SCNC: 28 MMOL/L — SIGNIFICANT CHANGE UP (ref 22–31)
CREAT SERPL-MCNC: 0.41 MG/DL — LOW (ref 0.5–1.3)
CULTURE RESULTS: SIGNIFICANT CHANGE UP
CULTURE RESULTS: SIGNIFICANT CHANGE UP
GLUCOSE BLDC GLUCOMTR-MCNC: 115 MG/DL — HIGH (ref 70–99)
GLUCOSE BLDC GLUCOMTR-MCNC: 146 MG/DL — HIGH (ref 70–99)
GLUCOSE BLDC GLUCOMTR-MCNC: 86 MG/DL — SIGNIFICANT CHANGE UP (ref 70–99)
GLUCOSE SERPL-MCNC: 100 MG/DL — HIGH (ref 70–99)
HCT VFR BLD CALC: 19.5 % — CRITICAL LOW (ref 34.5–45)
HCT VFR BLD CALC: 28.4 % — LOW (ref 34.5–45)
HGB BLD-MCNC: 6.4 G/DL — CRITICAL LOW (ref 11.5–15.5)
HGB BLD-MCNC: 9.6 G/DL — LOW (ref 11.5–15.5)
MCHC RBC-ENTMCNC: 31.7 PG — SIGNIFICANT CHANGE UP (ref 27–34)
MCHC RBC-ENTMCNC: 32.5 PG — SIGNIFICANT CHANGE UP (ref 27–34)
MCHC RBC-ENTMCNC: 32.8 GM/DL — SIGNIFICANT CHANGE UP (ref 32–36)
MCHC RBC-ENTMCNC: 33.8 GM/DL — SIGNIFICANT CHANGE UP (ref 32–36)
MCV RBC AUTO: 93.7 FL — SIGNIFICANT CHANGE UP (ref 80–100)
MCV RBC AUTO: 99 FL — SIGNIFICANT CHANGE UP (ref 80–100)
NRBC # BLD: 0 /100 WBCS — SIGNIFICANT CHANGE UP (ref 0–0)
NRBC # BLD: 0 /100 WBCS — SIGNIFICANT CHANGE UP (ref 0–0)
PLATELET # BLD AUTO: 218 K/UL — SIGNIFICANT CHANGE UP (ref 150–400)
PLATELET # BLD AUTO: 244 K/UL — SIGNIFICANT CHANGE UP (ref 150–400)
POTASSIUM SERPL-MCNC: 3.8 MMOL/L — SIGNIFICANT CHANGE UP (ref 3.5–5.3)
POTASSIUM SERPL-SCNC: 3.8 MMOL/L — SIGNIFICANT CHANGE UP (ref 3.5–5.3)
RBC # BLD: 1.97 M/UL — LOW (ref 3.8–5.2)
RBC # BLD: 3.03 M/UL — LOW (ref 3.8–5.2)
RBC # FLD: 18.6 % — HIGH (ref 10.3–14.5)
RBC # FLD: 18.7 % — HIGH (ref 10.3–14.5)
SODIUM SERPL-SCNC: 138 MMOL/L — SIGNIFICANT CHANGE UP (ref 135–145)
SPECIMEN SOURCE: SIGNIFICANT CHANGE UP
SPECIMEN SOURCE: SIGNIFICANT CHANGE UP
WBC # BLD: 5.64 K/UL — SIGNIFICANT CHANGE UP (ref 3.8–10.5)
WBC # BLD: 7.25 K/UL — SIGNIFICANT CHANGE UP (ref 3.8–10.5)
WBC # FLD AUTO: 5.64 K/UL — SIGNIFICANT CHANGE UP (ref 3.8–10.5)
WBC # FLD AUTO: 7.25 K/UL — SIGNIFICANT CHANGE UP (ref 3.8–10.5)

## 2020-09-17 PROCEDURE — 99233 SBSQ HOSP IP/OBS HIGH 50: CPT

## 2020-09-17 PROCEDURE — 99222 1ST HOSP IP/OBS MODERATE 55: CPT

## 2020-09-17 RX ORDER — MULTIVIT WITH MIN/MFOLATE/K2 340-15/3 G
1 POWDER (GRAM) ORAL ONCE
Refills: 0 | Status: COMPLETED | OUTPATIENT
Start: 2020-09-17 | End: 2020-09-17

## 2020-09-17 RX ADMIN — Medication 650 MILLIGRAM(S): at 22:25

## 2020-09-17 RX ADMIN — Medication 6 MILLIGRAM(S): at 21:25

## 2020-09-17 RX ADMIN — ATORVASTATIN CALCIUM 80 MILLIGRAM(S): 80 TABLET, FILM COATED ORAL at 21:24

## 2020-09-17 RX ADMIN — DIVALPROEX SODIUM 500 MILLIGRAM(S): 500 TABLET, DELAYED RELEASE ORAL at 21:25

## 2020-09-17 RX ADMIN — ENOXAPARIN SODIUM 60 MILLIGRAM(S): 100 INJECTION SUBCUTANEOUS at 06:27

## 2020-09-17 RX ADMIN — Medication 650 MILLIGRAM(S): at 21:25

## 2020-09-17 RX ADMIN — Medication 25 MILLIGRAM(S): at 17:09

## 2020-09-17 RX ADMIN — Medication 200 MILLIGRAM(S): at 21:24

## 2020-09-17 RX ADMIN — Medication 0.5 MILLIGRAM(S): at 17:09

## 2020-09-17 RX ADMIN — AMLODIPINE BESYLATE 10 MILLIGRAM(S): 2.5 TABLET ORAL at 06:26

## 2020-09-17 RX ADMIN — Medication 100 MILLIGRAM(S): at 11:08

## 2020-09-17 RX ADMIN — Medication 5 MILLIGRAM(S): at 21:24

## 2020-09-17 RX ADMIN — Medication 0.5 MILLIGRAM(S): at 06:30

## 2020-09-17 NOTE — PROGRESS NOTE ADULT - SUBJECTIVE AND OBJECTIVE BOX
Patient is a 70y old  Female who presents with a chief complaint of weakness and fall (16 Sep 2020 16:27)      Patient seen and examined at bedside.    ALLERGIES:  No Known Allergies    MEDICATIONS:  acetaminophen   Tablet .. 650 milliGRAM(s) Oral every 6 hours PRN  amLODIPine   Tablet 10 milliGRAM(s) Oral daily  atorvastatin 80 milliGRAM(s) Oral at bedtime  bisacodyl 5 milliGRAM(s) Oral at bedtime  dextrose 40% Gel 15 Gram(s) Oral once PRN  dextrose 5%. 1000 milliLiter(s) IV Continuous <Continuous>  dextrose 50% Injectable 12.5 Gram(s) IV Push once  dextrose 50% Injectable 25 Gram(s) IV Push once  dextrose 50% Injectable 25 Gram(s) IV Push once  diVALproex  milliGRAM(s) Oral at bedtime  glucagon  Injectable 1 milliGRAM(s) IntraMuscular once PRN  insulin lispro (HumaLOG) corrective regimen sliding scale   SubCutaneous three times a day before meals  insulin lispro (HumaLOG) corrective regimen sliding scale   SubCutaneous at bedtime  LORazepam     Tablet 0.5 milliGRAM(s) Oral two times a day  melatonin 6 milliGRAM(s) Oral at bedtime  metoprolol tartrate 25 milliGRAM(s) Oral two times a day  pantoprazole    Tablet 40 milliGRAM(s) Oral before breakfast  polyethylene glycol/electrolyte Solution. 4000 milliLiter(s) Oral once  thiamine 100 milliGRAM(s) Oral daily  traZODone 200 milliGRAM(s) Oral at bedtime    Vital Signs Last 24 Hrs  T(F): 98.2 (17 Sep 2020 10:46), Max: 98.4 (16 Sep 2020 11:34)  HR: 87 (17 Sep 2020 10:46) (87 - 97)  BP: 92/54 (17 Sep 2020 10:46) (92/54 - 140/70)  RR: 15 (17 Sep 2020 10:46) (15 - 19)  SpO2: 97% (17 Sep 2020 10:46) (90% - 99%)  I&O's Summary      PHYSICAL EXAM:  General: NAD, A/O x 3  ENT: MMM  Neck:   Lungs: Clear to auscultation bilaterally (Anteriorly)   Cardio: RR, S1/S2, No murmurs  Abdomen: Soft, NT/ND, Normal active Bowel Sounds   Extremities: No cyanosis, No edema    LABS:                        6.4    5.64  )-----------( 218      ( 17 Sep 2020 05:30 )             19.5     09-17    138  |  103  |  6   ----------------------------<  100  3.8   |  28  |  0.41    Ca    8.6      17 Sep 2020 05:30    TPro  5.2  /  Alb  2.1  /  TBili  0.9  /  DBili  x   /  AST  13  /  ALT  12  /  AlkPhos  41  09-15    eGFR if Non : 106 mL/min/1.73M2 (09-17-20 @ 05:30)  eGFR if African American: 122 mL/min/1.73M2 (09-17-20 @ 05:30)                          POCT Blood Glucose.: 115 mg/dL (17 Sep 2020 08:00)  POCT Blood Glucose.: 106 mg/dL (16 Sep 2020 21:34)  POCT Blood Glucose.: 118 mg/dL (16 Sep 2020 17:37)  POCT Blood Glucose.: 113 mg/dL (16 Sep 2020 12:56)          Culture - Urine (collected 12 Sep 2020 15:10)  Source: .Urine Clean Catch (Midstream)  Final Report (14 Sep 2020 17:19):    >=3 organisms. Probable collection contamination.    Culture - Blood (collected 12 Sep 2020 14:20)  Source: .Blood Blood-Peripheral  Preliminary Report (13 Sep 2020 21:01):    No growth to date.    Culture - Blood (collected 12 Sep 2020 14:20)  Source: .Blood Blood-Peripheral  Preliminary Report (13 Sep 2020 21:01):    No growth to date.        RADIOLOGY & ADDITIONAL TESTS:    Care Discussed with Consultants/Other Providers:    Patient is a 70y old  Female who presents with a chief complaint of weakness and fall (16 Sep 2020 16:27)    Patient seen and examined at bedside.  S: Reports feeling tired this morning. Denies cp/sob/palpitations. No dizziness/focal weakness.    ALLERGIES:  No Known Allergies    MEDICATIONS:  acetaminophen   Tablet .. 650 milliGRAM(s) Oral every 6 hours PRN  amLODIPine   Tablet 10 milliGRAM(s) Oral daily  atorvastatin 80 milliGRAM(s) Oral at bedtime  bisacodyl 5 milliGRAM(s) Oral at bedtime  dextrose 40% Gel 15 Gram(s) Oral once PRN  dextrose 5%. 1000 milliLiter(s) IV Continuous <Continuous>  dextrose 50% Injectable 12.5 Gram(s) IV Push once  dextrose 50% Injectable 25 Gram(s) IV Push once  dextrose 50% Injectable 25 Gram(s) IV Push once  diVALproex  milliGRAM(s) Oral at bedtime  glucagon  Injectable 1 milliGRAM(s) IntraMuscular once PRN  insulin lispro (HumaLOG) corrective regimen sliding scale   SubCutaneous three times a day before meals  insulin lispro (HumaLOG) corrective regimen sliding scale   SubCutaneous at bedtime  LORazepam     Tablet 0.5 milliGRAM(s) Oral two times a day  melatonin 6 milliGRAM(s) Oral at bedtime  metoprolol tartrate 25 milliGRAM(s) Oral two times a day  pantoprazole    Tablet 40 milliGRAM(s) Oral before breakfast  polyethylene glycol/electrolyte Solution. 4000 milliLiter(s) Oral once  thiamine 100 milliGRAM(s) Oral daily  traZODone 200 milliGRAM(s) Oral at bedtime    Vital Signs Last 24 Hrs  T(F): 98.2 (17 Sep 2020 10:46), Max: 98.4 (16 Sep 2020 11:34)  HR: 87 (17 Sep 2020 10:46) (87 - 97)  BP: 92/54 (17 Sep 2020 10:46) (92/54 - 140/70)  RR: 15 (17 Sep 2020 10:46) (15 - 19)  SpO2: 97% (17 Sep 2020 10:46) (90% - 99%)  I&O's Summary      PHYSICAL EXAM:  General: NAD, A/O x 1 (to self only, reported place "on a xerox machine" date "I don't know")  ENT: Dry MM  Lungs: Clear to auscultation bilaterally (Anteriorly)- Very limited exam, pt. not participating in exam. L lateral chest/flank around to posterior hematoma (per Surgery stable in appearance from prior).  Cardio: RR, S1/S2, +murmur  Abdomen: Soft, NT/ND, Normal active Bowel Sounds   Extremities: No cyanosis, No edema    LABS:                        6.4    5.64  )-----------( 218      ( 17 Sep 2020 05:30 )             19.5     09-17    138  |  103  |  6   ----------------------------<  100  3.8   |  28  |  0.41    Ca    8.6      17 Sep 2020 05:30    TPro  5.2  /  Alb  2.1  /  TBili  0.9  /  DBili  x   /  AST  13  /  ALT  12  /  AlkPhos  41  09-15    eGFR if Non : 106 mL/min/1.73M2 (09-17-20 @ 05:30)  eGFR if African American: 122 mL/min/1.73M2 (09-17-20 @ 05:30)        POCT Blood Glucose.: 115 mg/dL (17 Sep 2020 08:00)  POCT Blood Glucose.: 106 mg/dL (16 Sep 2020 21:34)  POCT Blood Glucose.: 118 mg/dL (16 Sep 2020 17:37)  POCT Blood Glucose.: 113 mg/dL (16 Sep 2020 12:56)        Culture - Urine (collected 12 Sep 2020 15:10)  Source: .Urine Clean Catch (Midstream)  Final Report (14 Sep 2020 17:19):    >=3 organisms. Probable collection contamination.    Culture - Blood (collected 12 Sep 2020 14:20)  Source: .Blood Blood-Peripheral  Preliminary Report (13 Sep 2020 21:01):    No growth to date.    Culture - Blood (collected 12 Sep 2020 14:20)  Source: .Blood Blood-Peripheral  Preliminary Report (13 Sep 2020 21:01):    No growth to date.        RADIOLOGY & ADDITIONAL TESTS:  < from: CT Abdomen and Pelvis w/ IV Cont (09.14.20 @ 17:24) >  Leftposterior lateral lower chest/upper abdominal wall hematoma appearing subacute.  Wall thickening gastric antrum may represent antritis.    < from: CT Lumbar Spine No Cont (09.12.20 @ 15:05) >  No acute findings    < from: CT Thoracic Spine No Cont (09.12.20 @ 15:05) >  No acute findings    < from: CT Head No Cont (09.12.20 @ 15:05) >  1. Unremarkable noncontrast CT scan of the brain.    < from: CT Pelvis Bony Only No Cont (09.12.20 @ 15:04) >  Question mild partial-thickness sacral insufficiency fracture as above      Care Discussed with Consultants/Other Providers: Dr. Cid.

## 2020-09-17 NOTE — PROGRESS NOTE ADULT - ASSESSMENT
A/P: 69yo female with PMH Alcoholism, DMII, CAD CVA, HTN, HLD, Bipolar d/o, Anemia, Korsakoff dementia presents with 20lb unintentional weight loss, decreased appetite and was noted to be pale and weak the day of admission, also noted to have a fall after using the bathroom on 9/12.    *Symptomatic Anemia  s/p 1 Unit PRBCs 9.14   H/H downtrend today to 6.4/19.5  Anemia lab w/u noted  9.12 FOBT negative, f/u repeat   GI recs appreciated  s/p EGD today w. gastritis   Pending Colonoscopy tomorrow- CLD today, bowel prep & NPO p MN  Hold Lovenox/ASA today    *KATIE- Resolved   baseline Cr 0.4- at or near baseline s/p IVF  Avoid nephrotoxins, monitor renal indices    *History of PE  Failed Xarelto   Hold full dose Lovenox 60 BID today in s/o likely active bleed, drop in H/H     *Unintentional Weight Loss  CEA 4.4, CA 19-9 37, AFP 2.3  Will likely require outpatient malignancy workup  Palliative consult, follow up GI evaluation    *Essential HTN  Continue Metoprolol & Amlodipine     *Left posterior later lower chest/upper abd wall Hematoma noted on CT Abdomen/Pelvis  Monitoring site & H/H closely  Appreciate Surgery involvement   Transfuse as needed to keep Hg>7    *History of CVA  Cont Statin  Will hold ASA today in s/o cont drop in H/H, close f/u as when to resume    *DMII  HgbA1c 5.1  BG levels appear stable   Continue FS with correction insulin    *Bipolar Disorder  Chronic, stable  Continue Divalproex, Trazodone, home meds     *Dementia  Chronic, stable   Continue supportive care    *h/o Alcoholism  Cont Thiamine     *DVT ppx  Holding Lovenox today in s/o Anemia w. cont drop in H/H    *GO  Full Code   Pts status/plan d/w Madhu (Brother) [phone].    A/P: 71yo female with PMH Alcoholism, DMII, CAD CVA, HTN, HLD, Bipolar d/o, Anemia, Korsakoff dementia presents with 20lb unintentional weight loss, decreased appetite and was noted to be pale and weak the day of admission, also noted to have a fall after using the bathroom on 9/12.    *Symptomatic Anemia  s/p 1 Unit PRBCs 9.14   H/H downtrend today to 6.4/19.5  Anemia lab w/u noted  9.12 FOBT negative, f/u repeat   GI recs appreciated  s/p EGD yesterday showing gastritis   Pending Colonoscopy tomorrow- CLD today, bowel prep & NPO p MN  Hold Lovenox/ASA today    *KATIE- Resolved   baseline Cr 0.4- at or near baseline s/p IVF  Avoid nephrotoxins, monitor renal indices    *History of PE  Failed Xarelto   Hold full dose Lovenox 60 BID today in s/o likely active bleed, drop in H/H   Will carefully watch hemoglobin and plan to restart Lovenox  If Hg unstable, will consider Vascular/Hematology consult    *Unintentional Weight Loss  CEA 4.4, CA 19-9 37, AFP 2.3  Will likely require outpatient malignancy workup  Palliative consult, follow up GI evaluation    *Essential HTN  Continue Metoprolol & Amlodipine     *Left posterior later lower chest/upper abd wall Hematoma noted on CT Abdomen/Pelvis  Monitoring site & H/H closely  Appreciate Surgery involvement   Transfuse as needed to keep Hg>7    *History of CVA  Cont Statin  Will hold ASA today in s/o cont drop in H/H, close f/u as when to resume    *DMII  HgbA1c 5.1  BG levels appear stable   Continue FS with correction insulin    *Bipolar Disorder  Chronic, stable  Continue Divalproex, Trazodone, home meds     *Dementia  Chronic, stable   Continue supportive care    *h/o Alcoholism  Cont Thiamine     *DVT ppx  Holding Lovenox today in s/o Anemia w. cont drop in H/H    *GO  Full Code   Pts status/plan d/w Madhu (Brother) [phone].

## 2020-09-17 NOTE — PROGRESS NOTE ADULT - SUBJECTIVE AND OBJECTIVE BOX
NP  Medicine GI Progress Note    70 year old Female (poor historian) with a PMHx of alcoholism, CVA, HTN, bipolar disease, HLD, diabetes, CAD, anemia, Korsakoff dementia, and PE on lovenox who presented to the ED on 9/12 s/p fall by her toilet at [facility].  She also has had a 20 lb weight loss over the past few months, with increasing weakness and decreased appetite.  In the ED she was found to be anemic and her Lovenox dose was noted to be supra therapeutic based on her current weight.  Her FOBT was negative and she was transfused 1u PRBC on 9/13, 9/14 and 9/17.  An oncologic workup was also sent based on her weight loss which revealed a slightly elevated CA-19 and CEA.  A CT of the abdomen showed Wall thickening gastric antrum which may represent antritis.    Allergies    No Known Allergies    Intolerances      MEDICATIONS:  MEDICATIONS  (STANDING):  amLODIPine   Tablet 10 milliGRAM(s) Oral daily  atorvastatin 80 milliGRAM(s) Oral at bedtime  bisacodyl 5 milliGRAM(s) Oral at bedtime  dextrose 5%. 1000 milliLiter(s) (50 mL/Hr) IV Continuous <Continuous>  dextrose 50% Injectable 12.5 Gram(s) IV Push once  dextrose 50% Injectable 25 Gram(s) IV Push once  dextrose 50% Injectable 25 Gram(s) IV Push once  diVALproex  milliGRAM(s) Oral at bedtime  insulin lispro (HumaLOG) corrective regimen sliding scale   SubCutaneous three times a day before meals  insulin lispro (HumaLOG) corrective regimen sliding scale   SubCutaneous at bedtime  LORazepam     Tablet 0.5 milliGRAM(s) Oral two times a day  magnesium citrate Oral Solution 1 Bottle Oral once  melatonin 6 milliGRAM(s) Oral at bedtime  metoprolol tartrate 25 milliGRAM(s) Oral two times a day  pantoprazole    Tablet 40 milliGRAM(s) Oral before breakfast  polyethylene glycol/electrolyte Solution. 4000 milliLiter(s) Oral once  thiamine 100 milliGRAM(s) Oral daily  traZODone 200 milliGRAM(s) Oral at bedtime    MEDICATIONS  (PRN):  acetaminophen   Tablet .. 650 milliGRAM(s) Oral every 6 hours PRN Mild Pain (1 - 3), Moderate Pain (4 - 6)  dextrose 40% Gel 15 Gram(s) Oral once PRN Blood Glucose LESS THAN 70 milliGRAM(s)/deciliter  glucagon  Injectable 1 milliGRAM(s) IntraMuscular once PRN Glucose LESS THAN 70 milligrams/deciliter    Vital Signs Last 24 Hrs  T(C): 36.7 (17 Sep 2020 15:03), Max: 36.9 (17 Sep 2020 11:29)  T(F): 98 (17 Sep 2020 15:03), Max: 98.5 (17 Sep 2020 11:29)  HR: 80 (17 Sep 2020 15:03) (80 - 97)  BP: 119/59 (17 Sep 2020 15:03) (92/54 - 140/70)  BP(mean): --  RR: 15 (17 Sep 2020 15:03) (14 - 17)  SpO2: 98% (17 Sep 2020 15:03) (90% - 99%)    09-17 @ 07:01  -  09-17 @ 15:19  --------------------------------------------------------  IN: 50 mL / OUT: 0 mL / NET: 50 mL        I&O's Summary    17 Sep 2020 07:01  -  17 Sep 2020 15:19  --------------------------------------------------------  IN: 50 mL / OUT: 0 mL / NET: 50 mL        PHYSICAL EXAM:  General: NAD  Neurological: A&Ox1-2  Cardiovascular: s1S2 RRR  Respiratory: CTA b/l no W/R/R  Gastrointestinal: soft NT/ND +BS  Extremities: no edema  Vascular: warm and well perfused bilaterally  LABS:                        6.4    5.64  )-----------( 218      ( 17 Sep 2020 05:30 )             19.5     09-17    138  |  103  |  6<L>  ----------------------------<  100<H>  3.8   |  28  |  0.41<L>    Ca    8.6      17 Sep 2020 05:30      RADIOLOGY & ADDITIONAL STUDIES:    Assessment and Plan:  70 year old Female (poor historian) with a PMHx of alcoholism, CVA, HTN, bipolar disease, HLD, diabetes, CAD, anemia, Korsakoff dementia, and PE on lovenox who presented to the ED on 9/12 s/p fall by her toilet at [facility].  She also has had a 20 lb weight loss over the past few months, with increasing weakness and decreased appetite.  In the ED she was found to be anemic and her Lovenox dose was noted to be supra therapeutic based on her current weight.  Her FOBT was negative and she was transfused 1u PRBC on 9/13, 9/14 and 9/17.  An oncologic workup was also sent based on her weight loss which revealed a slightly elevated CA-19 and CEA.  A CT of the abdomen showed Wall thickening gastric antrum which may represent antritis.    #Anemia  1. H/H 6.4/19.5 - 1 unit of PRBC's infusing   2. cont to trend H/H daily and transfuse as needed  3. c/w PPI  4. s/p EGD (9/16/20) gastric antrum thickening  5. Plan for colonoscopy in AM 9/18/20   6. Lovenox on hold   7. NPO after midnight   8. Seen at bedside with Dr. Hilario Mendoza NP  Department of Gastroenterology   GI cell: [phone]

## 2020-09-17 NOTE — PROGRESS NOTE ADULT - ASSESSMENT
stable    hold lovenox till tomorrow    transfuse 1 unit prbce    cbc in am 9/18/2020      thank you    dr angélica gleason  cell#[phone]

## 2020-09-17 NOTE — PROGRESS NOTE ADULT - SUBJECTIVE AND OBJECTIVE BOX
without complaints    looks comfortable    bp 96/70  aferbrile    abdomen-benign    left flank/back-not tenderness-a little more ecchymotic but not a lot    hct 19(down from 23.4)

## 2020-09-18 LAB
ANION GAP SERPL CALC-SCNC: 8 MMOL/L — SIGNIFICANT CHANGE UP (ref 5–17)
BUN SERPL-MCNC: 7 MG/DL — SIGNIFICANT CHANGE UP (ref 7–23)
CALCIUM SERPL-MCNC: 8.7 MG/DL — SIGNIFICANT CHANGE UP (ref 8.4–10.5)
CHLORIDE SERPL-SCNC: 101 MMOL/L — SIGNIFICANT CHANGE UP (ref 96–108)
CO2 SERPL-SCNC: 26 MMOL/L — SIGNIFICANT CHANGE UP (ref 22–31)
CREAT SERPL-MCNC: 0.44 MG/DL — LOW (ref 0.5–1.3)
GLUCOSE BLDC GLUCOMTR-MCNC: 103 MG/DL — HIGH (ref 70–99)
GLUCOSE BLDC GLUCOMTR-MCNC: 108 MG/DL — HIGH (ref 70–99)
GLUCOSE BLDC GLUCOMTR-MCNC: 215 MG/DL — HIGH (ref 70–99)
GLUCOSE BLDC GLUCOMTR-MCNC: 82 MG/DL — SIGNIFICANT CHANGE UP (ref 70–99)
GLUCOSE SERPL-MCNC: 163 MG/DL — HIGH (ref 70–99)
HCT VFR BLD CALC: 30 % — LOW (ref 34.5–45)
HGB BLD-MCNC: 10 G/DL — LOW (ref 11.5–15.5)
MAGNESIUM SERPL-MCNC: 1.5 MG/DL — LOW (ref 1.6–2.6)
MCHC RBC-ENTMCNC: 31.5 PG — SIGNIFICANT CHANGE UP (ref 27–34)
MCHC RBC-ENTMCNC: 33.3 GM/DL — SIGNIFICANT CHANGE UP (ref 32–36)
MCV RBC AUTO: 94.6 FL — SIGNIFICANT CHANGE UP (ref 80–100)
NRBC # BLD: 0 /100 WBCS — SIGNIFICANT CHANGE UP (ref 0–0)
PLATELET # BLD AUTO: 233 K/UL — SIGNIFICANT CHANGE UP (ref 150–400)
POTASSIUM SERPL-MCNC: 3.8 MMOL/L — SIGNIFICANT CHANGE UP (ref 3.5–5.3)
POTASSIUM SERPL-SCNC: 3.8 MMOL/L — SIGNIFICANT CHANGE UP (ref 3.5–5.3)
RBC # BLD: 3.17 M/UL — LOW (ref 3.8–5.2)
RBC # FLD: 18.9 % — HIGH (ref 10.3–14.5)
SODIUM SERPL-SCNC: 135 MMOL/L — SIGNIFICANT CHANGE UP (ref 135–145)
SURGICAL PATHOLOGY STUDY: SIGNIFICANT CHANGE UP
WBC # BLD: 6.95 K/UL — SIGNIFICANT CHANGE UP (ref 3.8–10.5)
WBC # FLD AUTO: 6.95 K/UL — SIGNIFICANT CHANGE UP (ref 3.8–10.5)

## 2020-09-18 PROCEDURE — 99232 SBSQ HOSP IP/OBS MODERATE 35: CPT

## 2020-09-18 PROCEDURE — 99233 SBSQ HOSP IP/OBS HIGH 50: CPT

## 2020-09-18 RX ORDER — MAGNESIUM OXIDE 400 MG ORAL TABLET 241.3 MG
800 TABLET ORAL ONCE
Refills: 0 | Status: COMPLETED | OUTPATIENT
Start: 2020-09-18 | End: 2020-09-18

## 2020-09-18 RX ORDER — ENOXAPARIN SODIUM 100 MG/ML
40 INJECTION SUBCUTANEOUS DAILY
Refills: 0 | Status: DISCONTINUED | OUTPATIENT
Start: 2020-09-18 | End: 2020-09-20

## 2020-09-18 RX ADMIN — Medication 25 MILLIGRAM(S): at 17:32

## 2020-09-18 RX ADMIN — PANTOPRAZOLE SODIUM 40 MILLIGRAM(S): 20 TABLET, DELAYED RELEASE ORAL at 06:13

## 2020-09-18 RX ADMIN — Medication 650 MILLIGRAM(S): at 11:07

## 2020-09-18 RX ADMIN — Medication 200 MILLIGRAM(S): at 22:17

## 2020-09-18 RX ADMIN — ATORVASTATIN CALCIUM 80 MILLIGRAM(S): 80 TABLET, FILM COATED ORAL at 22:17

## 2020-09-18 RX ADMIN — Medication 25 MILLIGRAM(S): at 06:12

## 2020-09-18 RX ADMIN — Medication 650 MILLIGRAM(S): at 12:00

## 2020-09-18 RX ADMIN — Medication 650 MILLIGRAM(S): at 22:18

## 2020-09-18 RX ADMIN — DIVALPROEX SODIUM 500 MILLIGRAM(S): 500 TABLET, DELAYED RELEASE ORAL at 22:18

## 2020-09-18 RX ADMIN — MAGNESIUM OXIDE 400 MG ORAL TABLET 800 MILLIGRAM(S): 241.3 TABLET ORAL at 17:31

## 2020-09-18 RX ADMIN — Medication 5 MILLIGRAM(S): at 22:17

## 2020-09-18 RX ADMIN — Medication 0.5 MILLIGRAM(S): at 17:32

## 2020-09-18 RX ADMIN — AMLODIPINE BESYLATE 10 MILLIGRAM(S): 2.5 TABLET ORAL at 06:12

## 2020-09-18 RX ADMIN — Medication 0.5 MILLIGRAM(S): at 06:13

## 2020-09-18 RX ADMIN — Medication 2: at 12:29

## 2020-09-18 RX ADMIN — Medication 100 MILLIGRAM(S): at 11:06

## 2020-09-18 RX ADMIN — Medication 650 MILLIGRAM(S): at 23:18

## 2020-09-18 NOTE — PROGRESS NOTE ADULT - ASSESSMENT
doing well    stable    hct 30    can restart lovenox      I will sign off case.    thank you    dr angélica gleason  cell#[phone]

## 2020-09-18 NOTE — PROGRESS NOTE ADULT - ASSESSMENT
A/P: 69yo female with PMH Alcoholism, DMII, CAD CVA, HTN, HLD, Bipolar d/o, Anemia, Korsakoff dementia presents with 20lb unintentional weight loss, decreased appetite and was noted to be pale and weak the day of admission, also noted to have a fall after using the bathroom on 9/12. Significant L-sided flank/back Hematoma sustained.      *Symptomatic Anemia  s/p 1 Unit PRBCs 9.14   H/H downtrend today to 6.4/19.5  Anemia lab w/u noted  9.12 FOBT negative, f/u repeat   GI recs appreciated  s/p EGD showing gastritis   Plan was for Colonoscopy today, however pt. refused/ did not tolerated the bowel prep, will f/u w. GI if would cont to pursue scope   f/u resuming Lovenox/ASA today    *KATIE- Resolved   baseline Cr 0.4- at or near baseline s/p IVF  Avoid nephrotoxins, monitor renal indices    *History of PE  Failed Xarelto   f/u resuming Lovenox 60 BID today if H/H stable     *Unintentional Weight Loss  CEA 4.4, CA 19-9 37, AFP 2.3  Will likely require outpatient malignancy workup  Palliative consult, follow up GI evaluation    *Essential HTN  Continue Metoprolol & Amlodipine     *Left posterior later lower chest/upper abd wall Hematoma noted on CT Abdomen/Pelvis  Monitoring site & H/H closely- appears stable  Appreciate Surgery involvement   Transfuse as needed to keep Hg>7    *History of CVA  Cont Statin  f/u resuming ASA as H/H allows     *DMII  HgbA1c 5.1  BG levels appear stable   Continue FS with correction insulin    *Bipolar Disorder  Chronic, stable  Continue Divalproex, Trazodone, home meds     *Dementia  Chronic, stable   Continue supportive care    *h/o Alcoholism  Cont Thiamine     *DVT ppx  Tx dose Lovenox on hold, will resume as appropriate     *[facility]  Full Code   Pts updated status/plan to be d/w Madhu (Brother) [phone].

## 2020-09-18 NOTE — PROGRESS NOTE ADULT - NUTRITIONAL ASSESSMENT
This patient has been assessed with a concern for Malnutrition and has been determined to have a diagnosis/diagnoses of Severe protein-calorie malnutrition.    This patient is being managed with:   Diet Clear Liquid-  Entered: Sep 15 2020  3:48PM

## 2020-09-18 NOTE — PROGRESS NOTE ADULT - SUBJECTIVE AND OBJECTIVE BOX
INTERVAL HPI/OVERNIGHT EVENTS: No overnight events. Patient seen and examined at bedside. Patient refusing to take bowel prep for colonoscopy today. Patient refusing procedure as well. Patient has no complaints.      HPI:  70 year old with a history of past alcoholism, CVA, HTN, bipolar disease, HLD, diabetes, CAD, anemia, Korsakoff dementia comes with a 20 lb weight loss, increasing weakness, decreased appetite, and today was found on the floor by her toilet at her nursing facility [facility].   Due to patient's dementia unable to get further history.  (12 Sep 2020 17:31)    MEDICATIONS  (STANDING):  amLODIPine   Tablet 10 milliGRAM(s) Oral daily  atorvastatin 80 milliGRAM(s) Oral at bedtime  bisacodyl 5 milliGRAM(s) Oral at bedtime  dextrose 5%. 1000 milliLiter(s) (50 mL/Hr) IV Continuous <Continuous>  dextrose 50% Injectable 12.5 Gram(s) IV Push once  dextrose 50% Injectable 25 Gram(s) IV Push once  dextrose 50% Injectable 25 Gram(s) IV Push once  diVALproex  milliGRAM(s) Oral at bedtime  insulin lispro (HumaLOG) corrective regimen sliding scale   SubCutaneous three times a day before meals  insulin lispro (HumaLOG) corrective regimen sliding scale   SubCutaneous at bedtime  LORazepam     Tablet 0.5 milliGRAM(s) Oral two times a day  magnesium oxide 800 milliGRAM(s) Oral once  melatonin 6 milliGRAM(s) Oral at bedtime  metoprolol tartrate 25 milliGRAM(s) Oral two times a day  pantoprazole    Tablet 40 milliGRAM(s) Oral before breakfast  thiamine 100 milliGRAM(s) Oral daily  traZODone 200 milliGRAM(s) Oral at bedtime    MEDICATIONS  (PRN):  acetaminophen   Tablet .. 650 milliGRAM(s) Oral every 6 hours PRN Mild Pain (1 - 3), Moderate Pain (4 - 6)  dextrose 40% Gel 15 Gram(s) Oral once PRN Blood Glucose LESS THAN 70 milliGRAM(s)/deciliter  glucagon  Injectable 1 milliGRAM(s) IntraMuscular once PRN Glucose LESS THAN 70 milligrams/deciliter      Allergies    No Known Allergies    Intolerances          PHYSICAL EXAM:   Vital Signs:  Vital Signs Last 24 Hrs  T(C): 36.8 (18 Sep 2020 16:40), Max: 37 (18 Sep 2020 11:53)  T(F): 98.3 (18 Sep 2020 16:40), Max: 98.6 (18 Sep 2020 11:53)  HR: 86 (18 Sep 2020 16:40) (70 - 98)  BP: 113/56 (18 Sep 2020 16:40) (95/49 - 149/72)  BP(mean): --  RR: 16 (18 Sep 2020 16:40) (15 - 16)  SpO2: 100% (18 Sep 2020 16:40) (97% - 100%)  Daily     Daily Weight in k.5 (18 Sep 2020 13:43)I&O's Summary    17 Sep 2020 07:01  -  18 Sep 2020 07:00  --------------------------------------------------------  IN: 50 mL / OUT: 0 mL / NET: 50 mL    18 Sep 2020 07:01  -  18 Sep 2020 17:10  --------------------------------------------------------  IN: 440 mL / OUT: 0 mL / NET: 440 mL        GENERAL:  NAD  HEENT:  NC/AT,  conjunctivae clear and pink, no thyromegaly, nodules, adenopathy, no JVD, sclera -anicteric  CHEST:  Full & symmetric excursion, no increased effort, breath sounds clear  HEART:  Regular rhythm, S1, S2, no murmur/rub/S3/S4, no abdominal bruit, no edema  ABDOMEN:  Soft, non-tender, non-distended, normoactive bowel sounds,  no masses ,no hepato-splenomegaly, no signs of chronic liver disease  EXTEREMITIES:  no cyanosis,clubbing or edema  SKIN:  No rash/erythema/ecchymoses/petechiae/wounds/abscess/warm/dry  NEURO:  Alert, oriented, but confused at times      LABS:                        10.0   6.95  )-----------( 233      ( 18 Sep 2020 11:28 )             30.0     09-    135  |  101  |  7   ----------------------------<  163<H>  3.8   |  26  |  0.44<L>    Ca    8.7      18 Sep 2020 11:28  Mg     1.5               amylase   lipase  RADIOLOGY & ADDITIONAL TESTS:    bd< from: CT Abdomen and Pelvis w/ IV Cont (20 @ 17:24) >    EXAM:  CT ABDOMEN AND PELVIS IC      PROCEDURE DATE:  2020        INTERPRETATION:  CLINICAL INFORMATION: Symptomatic anemia, elevated tumor markers    COMPARISON: CT scan thoracic and lumbar spine of the previous day and CT abdomen of 2019    PROCEDURE:  CT of the Abdomen and Pelvis was performed with intravenous contrast.  Intravenous contrast: 90 ml Omnipaque 350. 10 ml discarded.  Oral contrast: None.  Sagittal and coronal reformats were performed.    FINDINGS:  LOWER CHEST: Linear atelectasis or scarring at the right lung base.    LIVER: Within normal limits.  BILE DUCTS: Normal caliber.  GALLBLADDER: Contracted  SPLEEN: A small rounded hypodensity is seen in the lateral mid spleen incompletely characterized however not definitely seen on the prior exam.  PANCREAS: Within normal limits.  ADRENALS: Within normal limits.  KIDNEYS/URETERS: Small hypodense lesions in the left kidney are too small to characterize.    BLADDER: Within normal limits.  REPRODUCTIVE ORGANS: Uterus and adnexa within normal limits.    BOWEL: No bowel obstruction. Appendix is not visualized. No evidence of inflammation in the pericecal region.. There is colonic diverticulosis without diverticulitis. There is wall thickening at the level of the gastric antrum  PERITONEUM: No ascites.  VESSELS: Atherosclerotic changes.  RETROPERITONEUM/LYMPH NODES: No lymphadenopathy.  ABDOMINAL WALL: There is fullness in the subcutaneous soft tissues/musculature of the left lower posterior lateral chest and upper abdominal wall suggestive of subacute hematoma. This measures up to 4 cm in thickness..  BONES: There are degenerative changes of the spine. Please refer to CAT scan of the spine of the previous day for more detailed evaluation    IMPRESSION:  Leftposterior lateral lower chest/upper abdominal wall hematoma appearing subacute.  Wall thickening gastric antrum may represent antritis.      JAKE VIAN M.D.,ATTENDING RADIOLOGIST  This document has been electronically signed. Sep 14 2020  6:48PM

## 2020-09-18 NOTE — DIETITIAN INITIAL EVALUATION ADULT. - PERTINENT MEDS FT
Lispro, Mag-ox, Ativan ,Tylenol, Dulcolax, Protonix, Lopressor, Norvasc, lipitor, Depakote , Melatonin

## 2020-09-18 NOTE — DIETITIAN INITIAL EVALUATION ADULT. - OTHER INFO
70 year old with a history of past alcoholism, CVA, HTN, bipolar disease, HLD, diabetes, CAD, anemia, Korsakoff dementia comes with a 20 lb weight loss, increasing weakness, decreased appetite, Patient s/p recent transfusion 3 units due to H/H(L). (+) large hematoma on  back . Patient refusing GI work up . (+) 20lb weight loss noted over few months asper H& P , Patient noted with  dementia & asphasia , patient noted with evidence of severe  malnutrition though physical assessment ,(+) Muscle wasting  & loss of body fat  observed.(temporal , clavicle , scapular, calf ) (+) Bm (9/17) fecal incontinence noted , No edema noted Suggest advance diet if patient refuses GI work up

## 2020-09-18 NOTE — PROGRESS NOTE ADULT - ASSESSMENT
70 year old Female (poor historian) with a PMHx of alcoholism, CVA, HTN, bipolar disease, HLD, diabetes, CAD, anemia, Korsakoff dementia, and PE on lovenox who presented to the ED on 9/12 s/p fall by her toilet at [facility].  She also has had a 20 lb weight loss over the past few months, with increasing weakness and decreased appetite.  In the ED she was found to be anemic and her Lovenox dose was noted to be supra therapeutic based on her current weight.  Her FOBT was negative and she was transfused 1u PRBC on 9/13, 9/14 and 9/17.  An oncologic workup was also sent based on her weight loss which revealed a slightly elevated CA-19 and CEA.  A CT of the abdomen showed Wall thickening gastric antrum which may represent antritis. Patient refusing bowel prep for colonoscopy today and also refusing procedure. Spoke with patient's HCP, Madhu Sandra, to discuss cancelation of colonoscopy and is in agreement at this time, but would like to perused at a later time. With H/H stabilizing it is reasonable to defer colonoscopy at this time.    #Anemia  - status post 1 unit of PRBC's  - H/H 10/30 today  - cont to trend H/H daily and transfuse as needed  - c/w PPI  - s/p EGD (9/16/20) gastric antrum thickening    - Patient seen on rounds with Dr. Hilario Rosales, NP  Department of Gastroenterology   GI cell: [phone]

## 2020-09-18 NOTE — PROGRESS NOTE ADULT - SUBJECTIVE AND OBJECTIVE BOX
Patient is a 70y old  Female who presents with a chief complaint of weakness and fall (18 Sep 2020 11:48)    Patient seen and examined at bedside.  S: Offers no complaints this morning.     ALLERGIES:  No Known Allergies    MEDICATIONS:  acetaminophen   Tablet .. 650 milliGRAM(s) Oral every 6 hours PRN  amLODIPine   Tablet 10 milliGRAM(s) Oral daily  atorvastatin 80 milliGRAM(s) Oral at bedtime  bisacodyl 5 milliGRAM(s) Oral at bedtime  dextrose 40% Gel 15 Gram(s) Oral once PRN  dextrose 5%. 1000 milliLiter(s) IV Continuous <Continuous>  dextrose 50% Injectable 12.5 Gram(s) IV Push once  dextrose 50% Injectable 25 Gram(s) IV Push once  dextrose 50% Injectable 25 Gram(s) IV Push once  diVALproex  milliGRAM(s) Oral at bedtime  glucagon  Injectable 1 milliGRAM(s) IntraMuscular once PRN  insulin lispro (HumaLOG) corrective regimen sliding scale   SubCutaneous three times a day before meals  insulin lispro (HumaLOG) corrective regimen sliding scale   SubCutaneous at bedtime  LORazepam     Tablet 0.5 milliGRAM(s) Oral two times a day  magnesium oxide 800 milliGRAM(s) Oral once  melatonin 6 milliGRAM(s) Oral at bedtime  metoprolol tartrate 25 milliGRAM(s) Oral two times a day  pantoprazole    Tablet 40 milliGRAM(s) Oral before breakfast  thiamine 100 milliGRAM(s) Oral daily  traZODone 200 milliGRAM(s) Oral at bedtime    Vital Signs Last 24 Hrs  T(F): 98.6 (18 Sep 2020 11:53), Max: 98.6 (18 Sep 2020 11:53)  HR: 98 (18 Sep 2020 11:53) (70 - 98)  BP: 95/49 (18 Sep 2020 11:53) (95/49 - 149/72)  RR: 16 (18 Sep 2020 11:53) (14 - 16)  SpO2: 99% (18 Sep 2020 11:53) (97% - 99%)  I&O's Summary    17 Sep 2020 07:01  -  18 Sep 2020 07:00  --------------------------------------------------------  IN: 50 mL / OUT: 0 mL / NET: 50 mL      PHYSICAL EXAM:  General: NAD, A/O x 1 (to self only, reported place "on a xerox machine" date "I don't know")  ENT: Dry MM  Lungs: Clear to auscultation bilaterally (Anteriorly)- Very limited exam, pt. not participating in exam. L lateral chest/flank around to posterior hematoma (per Surgery stable in appearance from prior).  Cardio: RR, S1/S2, +murmur  Abdomen: Soft, NT/ND, Normal active Bowel Sounds   Extremities: No cyanosis, No edema      LABS:                        10.0   6.95  )-----------( 233      ( 18 Sep 2020 11:28 )             30.0     09-18    135  |  101  |  7   ----------------------------<  163  3.8   |  26  |  0.44    Ca    8.7      18 Sep 2020 11:28  Mg     1.5     09-18      eGFR if Non African American: 103 mL/min/1.73M2 (09-18-20 @ 11:28)  eGFR if African American: 119 mL/min/1.73M2 (09-18-20 @ 11:28)      POCT Blood Glucose.: 215 mg/dL (18 Sep 2020 12:25)  POCT Blood Glucose.: 108 mg/dL (18 Sep 2020 07:42)  POCT Blood Glucose.: 86 mg/dL (17 Sep 2020 21:13)      Culture - Urine (collected 12 Sep 2020 15:10)  Source: .Urine Clean Catch (Midstream)  Final Report (14 Sep 2020 17:19):    >=3 organisms. Probable collection contamination.    Culture - Blood (collected 12 Sep 2020 14:20)  Source: .Blood Blood-Peripheral  Final Report (17 Sep 2020 21:00):    No Growth Final    Culture - Blood (collected 12 Sep 2020 14:20)  Source: .Blood Blood-Peripheral  Final Report (17 Sep 2020 21:00):    No Growth Final        RADIOLOGY & ADDITIONAL TESTS:  < from: CT Abdomen and Pelvis w/ IV Cont (09.14.20 @ 17:24) >  Leftposterior lateral lower chest/upper abdominal wall hematoma appearing subacute.  Wall thickening gastric antrum may represent antritis.    < from: CT Lumbar Spine No Cont (09.12.20 @ 15:05) >  No acute findings    < from: CT Thoracic Spine No Cont (09.12.20 @ 15:05) >  No acute findings    < from: CT Head No Cont (09.12.20 @ 15:05) >  1. Unremarkable noncontrast CT scan of the brain.    < from: CT Pelvis Bony Only No Cont (09.12.20 @ 15:04) >  Question mild partial-thickness sacral insufficiency fracture as above    Care Discussed with Consultants/Other Providers: Dr. Scherer.    Patient is a 70y old  Female who presents with a chief complaint of weakness and fall (18 Sep 2020 11:48)    Patient seen and examined at bedside.  S: Offers no complaints this morning.     ALLERGIES:  No Known Allergies    MEDICATIONS:  acetaminophen   Tablet .. 650 milliGRAM(s) Oral every 6 hours PRN  amLODIPine   Tablet 10 milliGRAM(s) Oral daily  atorvastatin 80 milliGRAM(s) Oral at bedtime  bisacodyl 5 milliGRAM(s) Oral at bedtime  dextrose 40% Gel 15 Gram(s) Oral once PRN  dextrose 5%. 1000 milliLiter(s) IV Continuous <Continuous>  dextrose 50% Injectable 12.5 Gram(s) IV Push once  dextrose 50% Injectable 25 Gram(s) IV Push once  dextrose 50% Injectable 25 Gram(s) IV Push once  diVALproex  milliGRAM(s) Oral at bedtime  glucagon  Injectable 1 milliGRAM(s) IntraMuscular once PRN  insulin lispro (HumaLOG) corrective regimen sliding scale   SubCutaneous three times a day before meals  insulin lispro (HumaLOG) corrective regimen sliding scale   SubCutaneous at bedtime  LORazepam     Tablet 0.5 milliGRAM(s) Oral two times a day  magnesium oxide 800 milliGRAM(s) Oral once  melatonin 6 milliGRAM(s) Oral at bedtime  metoprolol tartrate 25 milliGRAM(s) Oral two times a day  pantoprazole    Tablet 40 milliGRAM(s) Oral before breakfast  thiamine 100 milliGRAM(s) Oral daily  traZODone 200 milliGRAM(s) Oral at bedtime    Vital Signs Last 24 Hrs  T(F): 98.6 (18 Sep 2020 11:53), Max: 98.6 (18 Sep 2020 11:53)  HR: 98 (18 Sep 2020 11:53) (70 - 98)  BP: 95/49 (18 Sep 2020 11:53) (95/49 - 149/72)  RR: 16 (18 Sep 2020 11:53) (14 - 16)  SpO2: 99% (18 Sep 2020 11:53) (97% - 99%)  I&O's Summary    17 Sep 2020 07:01  -  18 Sep 2020 07:00  --------------------------------------------------------  IN: 50 mL / OUT: 0 mL / NET: 50 mL      PHYSICAL EXAM:  General: NAD, A/O x 1 (to self only, reported place "on a xerox machine" date "I don't know")  ENT: Dry MM  Lungs: Clear to auscultation bilaterally (Anteriorly)- Very limited exam, pt. not participating in exam. L lateral chest/flank around to posterior hematoma (per Surgery stable in appearance from prior).  Cardio: RR, S1/S2, +murmur  Abdomen: Soft, NT/ND, Normal active Bowel Sounds   Extremities: No cyanosis, No edema      LABS:                        10.0   6.95  )-----------( 233      ( 18 Sep 2020 11:28 )             30.0     09-18    135  |  101  |  7   ----------------------------<  163  3.8   |  26  |  0.44    Ca    8.7      18 Sep 2020 11:28  Mg     1.5     09-18      eGFR if Non African American: 103 mL/min/1.73M2 (09-18-20 @ 11:28)  eGFR if African American: 119 mL/min/1.73M2 (09-18-20 @ 11:28)      POCT Blood Glucose.: 215 mg/dL (18 Sep 2020 12:25)  POCT Blood Glucose.: 108 mg/dL (18 Sep 2020 07:42)  POCT Blood Glucose.: 86 mg/dL (17 Sep 2020 21:13)      Culture - Urine (collected 12 Sep 2020 15:10)  Source: .Urine Clean Catch (Midstream)  Final Report (14 Sep 2020 17:19):    >=3 organisms. Probable collection contamination.    Culture - Blood (collected 12 Sep 2020 14:20)  Source: .Blood Blood-Peripheral  Final Report (17 Sep 2020 21:00):    No Growth Final    Culture - Blood (collected 12 Sep 2020 14:20)  Source: .Blood Blood-Peripheral  Final Report (17 Sep 2020 21:00):    No Growth Final        RADIOLOGY & ADDITIONAL TESTS:  < from: CT Abdomen and Pelvis w/ IV Cont (09.14.20 @ 17:24) >  Leftposterior lateral lower chest/upper abdominal wall hematoma appearing subacute.  Wall thickening gastric antrum may represent antritis.    < from: CT Lumbar Spine No Cont (09.12.20 @ 15:05) >  No acute findings    < from: CT Thoracic Spine No Cont (09.12.20 @ 15:05) >  No acute findings    < from: CT Head No Cont (09.12.20 @ 15:05) >  1. Unremarkable noncontrast CT scan of the brain.    < from: CT Pelvis Bony Only No Cont (09.12.20 @ 15:04) >  Question mild partial-thickness sacral insufficiency fracture as above    Care Discussed with Consultants/Other Providers: Dr. Reid.

## 2020-09-19 LAB
ANION GAP SERPL CALC-SCNC: 7 MMOL/L — SIGNIFICANT CHANGE UP (ref 5–17)
ANION GAP SERPL CALC-SCNC: 7 MMOL/L — SIGNIFICANT CHANGE UP (ref 5–17)
BUN SERPL-MCNC: 7 MG/DL — SIGNIFICANT CHANGE UP (ref 7–23)
BUN SERPL-MCNC: 7 MG/DL — SIGNIFICANT CHANGE UP (ref 7–23)
CALCIUM SERPL-MCNC: 8.7 MG/DL — SIGNIFICANT CHANGE UP (ref 8.4–10.5)
CALCIUM SERPL-MCNC: 8.8 MG/DL — SIGNIFICANT CHANGE UP (ref 8.4–10.5)
CHLORIDE SERPL-SCNC: 101 MMOL/L — SIGNIFICANT CHANGE UP (ref 96–108)
CHLORIDE SERPL-SCNC: 101 MMOL/L — SIGNIFICANT CHANGE UP (ref 96–108)
CO2 SERPL-SCNC: 28 MMOL/L — SIGNIFICANT CHANGE UP (ref 22–31)
CO2 SERPL-SCNC: 28 MMOL/L — SIGNIFICANT CHANGE UP (ref 22–31)
CREAT SERPL-MCNC: 0.47 MG/DL — LOW (ref 0.5–1.3)
CREAT SERPL-MCNC: 0.5 MG/DL — SIGNIFICANT CHANGE UP (ref 0.5–1.3)
GLUCOSE BLDC GLUCOMTR-MCNC: 101 MG/DL — HIGH (ref 70–99)
GLUCOSE BLDC GLUCOMTR-MCNC: 101 MG/DL — HIGH (ref 70–99)
GLUCOSE BLDC GLUCOMTR-MCNC: 113 MG/DL — HIGH (ref 70–99)
GLUCOSE BLDC GLUCOMTR-MCNC: 115 MG/DL — HIGH (ref 70–99)
GLUCOSE SERPL-MCNC: 121 MG/DL — HIGH (ref 70–99)
GLUCOSE SERPL-MCNC: 85 MG/DL — SIGNIFICANT CHANGE UP (ref 70–99)
HCT VFR BLD CALC: 26.4 % — LOW (ref 34.5–45)
HGB BLD-MCNC: 8.9 G/DL — LOW (ref 11.5–15.5)
MAGNESIUM SERPL-MCNC: 1.7 MG/DL — SIGNIFICANT CHANGE UP (ref 1.6–2.6)
MCHC RBC-ENTMCNC: 32.1 PG — SIGNIFICANT CHANGE UP (ref 27–34)
MCHC RBC-ENTMCNC: 33.7 GM/DL — SIGNIFICANT CHANGE UP (ref 32–36)
MCV RBC AUTO: 95.3 FL — SIGNIFICANT CHANGE UP (ref 80–100)
NRBC # BLD: 0 /100 WBCS — SIGNIFICANT CHANGE UP (ref 0–0)
PLATELET # BLD AUTO: 260 K/UL — SIGNIFICANT CHANGE UP (ref 150–400)
POTASSIUM SERPL-MCNC: 3.7 MMOL/L — SIGNIFICANT CHANGE UP (ref 3.5–5.3)
POTASSIUM SERPL-MCNC: 3.8 MMOL/L — SIGNIFICANT CHANGE UP (ref 3.5–5.3)
POTASSIUM SERPL-SCNC: 3.7 MMOL/L — SIGNIFICANT CHANGE UP (ref 3.5–5.3)
POTASSIUM SERPL-SCNC: 3.8 MMOL/L — SIGNIFICANT CHANGE UP (ref 3.5–5.3)
RBC # BLD: 2.77 M/UL — LOW (ref 3.8–5.2)
RBC # FLD: 19 % — HIGH (ref 10.3–14.5)
SODIUM SERPL-SCNC: 136 MMOL/L — SIGNIFICANT CHANGE UP (ref 135–145)
SODIUM SERPL-SCNC: 136 MMOL/L — SIGNIFICANT CHANGE UP (ref 135–145)
WBC # BLD: 4.83 K/UL — SIGNIFICANT CHANGE UP (ref 3.8–10.5)
WBC # FLD AUTO: 4.83 K/UL — SIGNIFICANT CHANGE UP (ref 3.8–10.5)

## 2020-09-19 PROCEDURE — 99232 SBSQ HOSP IP/OBS MODERATE 35: CPT

## 2020-09-19 RX ADMIN — Medication 25 MILLIGRAM(S): at 06:05

## 2020-09-19 RX ADMIN — AMLODIPINE BESYLATE 10 MILLIGRAM(S): 2.5 TABLET ORAL at 06:06

## 2020-09-19 RX ADMIN — ATORVASTATIN CALCIUM 80 MILLIGRAM(S): 80 TABLET, FILM COATED ORAL at 22:10

## 2020-09-19 RX ADMIN — PANTOPRAZOLE SODIUM 40 MILLIGRAM(S): 20 TABLET, DELAYED RELEASE ORAL at 06:05

## 2020-09-19 RX ADMIN — Medication 650 MILLIGRAM(S): at 23:10

## 2020-09-19 RX ADMIN — Medication 0.5 MILLIGRAM(S): at 17:26

## 2020-09-19 RX ADMIN — Medication 5 MILLIGRAM(S): at 22:10

## 2020-09-19 RX ADMIN — Medication 0.5 MILLIGRAM(S): at 06:05

## 2020-09-19 RX ADMIN — Medication 25 MILLIGRAM(S): at 17:26

## 2020-09-19 RX ADMIN — ENOXAPARIN SODIUM 40 MILLIGRAM(S): 100 INJECTION SUBCUTANEOUS at 14:17

## 2020-09-19 RX ADMIN — DIVALPROEX SODIUM 500 MILLIGRAM(S): 500 TABLET, DELAYED RELEASE ORAL at 22:10

## 2020-09-19 RX ADMIN — Medication 650 MILLIGRAM(S): at 22:10

## 2020-09-19 RX ADMIN — Medication 6 MILLIGRAM(S): at 22:10

## 2020-09-19 RX ADMIN — Medication 200 MILLIGRAM(S): at 22:10

## 2020-09-19 NOTE — PROGRESS NOTE ADULT - ASSESSMENT
A/P: 69yo female with PMH Alcoholism, DMII, CAD CVA, HTN, HLD, Bipolar d/o, Anemia, Korsakoff dementia presents with 20lb unintentional weight loss, decreased appetite and was noted to be pale and weak the day of admission, also noted to have a fall after using the bathroom on 9/12. Significant L-sided flank/back Hematoma sustained.      *Symptomatic Anemia  s/p 1 Unit PRBCs 9/17  Hg today 8.9 (10 yesterday)  Anemia lab w/u noted  9.12 FOBT negative, f/u repeat   GI recs appreciated  s/p EGD showing gastritis   Plan was for Colonoscopy today, however pt. refused/ did not tolerated the bowel prep, will f/u w. GI if would cont to pursue scope     *KATIE- Resolved   baseline Cr 0.4- at or near baseline s/p IVF  Avoid nephrotoxins, monitor renal indices    *History of PE  Failed Xarelto   f/u resuming Lovenox 60 BID tomorrow if H/H stable     *Unintentional Weight Loss  CEA 4.4, CA 19-9 37, AFP 2.3  Will likely require outpatient malignancy workup  Palliative consult, follow up GI evaluation    *Essential HTN  Continue Metoprolol & Amlodipine     *Left posterior later lower chest/upper abd wall Hematoma noted on CT Abdomen/Pelvis  Monitoring site & H/H closely- appears stable  Appreciate Surgery involvement   Transfuse as needed to keep Hg>7    *History of CVA  Cont Statin  f/u resuming ASA as H/H allows     *DMII  HgbA1c 5.1  BG levels appear stable   Continue FS with correction insulin    *Bipolar Disorder  Chronic, stable  Continue Divalproex, Trazodone, home meds     *Dementia  Chronic, stable   Continue supportive care    *h/o Alcoholism  Cont Thiamine     *DVT ppx  C/w Lovenox 40mg daily for now, if Hg stable will re-start therapeutic Lovenox (history of PE)    *[facility]  Full Code   Pts updated status/plan to be d/w Madhu (Brother) [phone].

## 2020-09-19 NOTE — PROGRESS NOTE ADULT - SUBJECTIVE AND OBJECTIVE BOX
Patient is a 70y old  Female who presents with a chief complaint of weakness and fall (18 Sep 2020 17:10)    Patient seen and examined at bedside. No acute overnight events. Denies fever, chills, chest pain, shortness of breath.     ALLERGIES:  No Known Allergies    MEDICATIONS  (STANDING):  amLODIPine   Tablet 10 milliGRAM(s) Oral daily  atorvastatin 80 milliGRAM(s) Oral at bedtime  bisacodyl 5 milliGRAM(s) Oral at bedtime  dextrose 5%. 1000 milliLiter(s) (50 mL/Hr) IV Continuous <Continuous>  dextrose 50% Injectable 12.5 Gram(s) IV Push once  dextrose 50% Injectable 25 Gram(s) IV Push once  dextrose 50% Injectable 25 Gram(s) IV Push once  diVALproex  milliGRAM(s) Oral at bedtime  enoxaparin Injectable 40 milliGRAM(s) SubCutaneous daily  insulin lispro (HumaLOG) corrective regimen sliding scale   SubCutaneous three times a day before meals  insulin lispro (HumaLOG) corrective regimen sliding scale   SubCutaneous at bedtime  LORazepam     Tablet 0.5 milliGRAM(s) Oral two times a day  melatonin 6 milliGRAM(s) Oral at bedtime  metoprolol tartrate 25 milliGRAM(s) Oral two times a day  pantoprazole    Tablet 40 milliGRAM(s) Oral before breakfast  thiamine 100 milliGRAM(s) Oral daily  traZODone 200 milliGRAM(s) Oral at bedtime    MEDICATIONS  (PRN):  acetaminophen   Tablet .. 650 milliGRAM(s) Oral every 6 hours PRN Mild Pain (1 - 3), Moderate Pain (4 - 6)  dextrose 40% Gel 15 Gram(s) Oral once PRN Blood Glucose LESS THAN 70 milliGRAM(s)/deciliter  glucagon  Injectable 1 milliGRAM(s) IntraMuscular once PRN Glucose LESS THAN 70 milligrams/deciliter    Vital Signs Last 24 Hrs  T(F): 98.2 (19 Sep 2020 10:21), Max: 98.8 (19 Sep 2020 06:37)  HR: 88 (19 Sep 2020 10:21) (86 - 94)  BP: 112/- (19 Sep 2020 10:21) (112/- - 147/79)  RR: 18 (19 Sep 2020 10:21) (16 - 18)  SpO2: 98% (19 Sep 2020 10:21) (98% - 100%)  I&O's Summary    18 Sep 2020 07:01  -  19 Sep 2020 07:00  --------------------------------------------------------  IN: 440 mL / OUT: 0 mL / NET: 440 mL    19 Sep 2020 07:01  -  19 Sep 2020 14:56  --------------------------------------------------------  IN: 240 mL / OUT: 0 mL / NET: 240 mL    BMI (kg/m2): 22.4 (09-16-20 @ 13:20)    PHYSICAL EXAM:  General: NAD, A/O x 1  ENT: MMM, no tonsilar exudate  Neck: Supple, No JVD  Lungs: Clear to auscultation bilaterally, no wheezes. Good air entry bilaterally   Cardio: RRR, S1/S2, No murmurs. L lateral chest hematoma tender to touch  Abdomen: Soft, Nontender, Nondistended; Bowel sounds present  Extremities: No calf tenderness, No pitting edema    LABS:                        8.9    4.83  )-----------( 260      ( 19 Sep 2020 08:10 )             26.4       09-19    136  |  101  |  7   ----------------------------<  85  3.8   |  28  |  0.47    Ca    8.8      19 Sep 2020 08:10  Mg     1.7     09-19    eGFR if Non African American: 101 mL/min/1.73M2 (09-19-20 @ 08:10)  eGFR if African American: 117 mL/min/1.73M2 (09-19-20 @ 08:10)    POCT Blood Glucose.: 115 mg/dL (19 Sep 2020 11:38)  POCT Blood Glucose.: 101 mg/dL (19 Sep 2020 08:16)  POCT Blood Glucose.: 103 mg/dL (18 Sep 2020 21:35)  POCT Blood Glucose.: 82 mg/dL (18 Sep 2020 17:01)    Culture - Urine (collected 12 Sep 2020 15:10)  Source: .Urine Clean Catch (Midstream)  Final Report (14 Sep 2020 17:19):    >=3 organisms. Probable collection contamination.    RADIOLOGY & ADDITIONAL TESTS: EKG, CXR    Care Discussed with Consultants/Other Providers: Yes

## 2020-09-19 NOTE — PROGRESS NOTE ADULT - NUTRITIONAL ASSESSMENT
This patient has been assessed with a concern for Malnutrition and has been determined to have a diagnosis/diagnoses of Severe protein-calorie malnutrition.    This patient is being managed with:   Diet Mechanical Soft-  Entered: Sep 19 2020  9:29AM

## 2020-09-20 LAB
ANION GAP SERPL CALC-SCNC: 6 MMOL/L — SIGNIFICANT CHANGE UP (ref 5–17)
BUN SERPL-MCNC: 11 MG/DL — SIGNIFICANT CHANGE UP (ref 7–23)
CALCIUM SERPL-MCNC: 8.8 MG/DL — SIGNIFICANT CHANGE UP (ref 8.4–10.5)
CHLORIDE SERPL-SCNC: 102 MMOL/L — SIGNIFICANT CHANGE UP (ref 96–108)
CO2 SERPL-SCNC: 28 MMOL/L — SIGNIFICANT CHANGE UP (ref 22–31)
CREAT SERPL-MCNC: 0.46 MG/DL — LOW (ref 0.5–1.3)
GLUCOSE BLDC GLUCOMTR-MCNC: 118 MG/DL — HIGH (ref 70–99)
GLUCOSE BLDC GLUCOMTR-MCNC: 120 MG/DL — HIGH (ref 70–99)
GLUCOSE BLDC GLUCOMTR-MCNC: 132 MG/DL — HIGH (ref 70–99)
GLUCOSE SERPL-MCNC: 100 MG/DL — HIGH (ref 70–99)
HCT VFR BLD CALC: 26.8 % — LOW (ref 34.5–45)
HGB BLD-MCNC: 9 G/DL — LOW (ref 11.5–15.5)
MCHC RBC-ENTMCNC: 32.5 PG — SIGNIFICANT CHANGE UP (ref 27–34)
MCHC RBC-ENTMCNC: 33.6 GM/DL — SIGNIFICANT CHANGE UP (ref 32–36)
MCV RBC AUTO: 96.8 FL — SIGNIFICANT CHANGE UP (ref 80–100)
NRBC # BLD: 0 /100 WBCS — SIGNIFICANT CHANGE UP (ref 0–0)
PLATELET # BLD AUTO: 276 K/UL — SIGNIFICANT CHANGE UP (ref 150–400)
POTASSIUM SERPL-MCNC: 3.6 MMOL/L — SIGNIFICANT CHANGE UP (ref 3.5–5.3)
POTASSIUM SERPL-SCNC: 3.6 MMOL/L — SIGNIFICANT CHANGE UP (ref 3.5–5.3)
RBC # BLD: 2.77 M/UL — LOW (ref 3.8–5.2)
RBC # FLD: 19.7 % — HIGH (ref 10.3–14.5)
SODIUM SERPL-SCNC: 136 MMOL/L — SIGNIFICANT CHANGE UP (ref 135–145)
WBC # BLD: 5.97 K/UL — SIGNIFICANT CHANGE UP (ref 3.8–10.5)
WBC # FLD AUTO: 5.97 K/UL — SIGNIFICANT CHANGE UP (ref 3.8–10.5)

## 2020-09-20 PROCEDURE — 99232 SBSQ HOSP IP/OBS MODERATE 35: CPT

## 2020-09-20 RX ORDER — ENOXAPARIN SODIUM 100 MG/ML
55 INJECTION SUBCUTANEOUS EVERY 12 HOURS
Refills: 0 | Status: DISCONTINUED | OUTPATIENT
Start: 2020-09-20 | End: 2020-09-21

## 2020-09-20 RX ADMIN — Medication 200 MILLIGRAM(S): at 22:09

## 2020-09-20 RX ADMIN — Medication 650 MILLIGRAM(S): at 23:13

## 2020-09-20 RX ADMIN — DIVALPROEX SODIUM 500 MILLIGRAM(S): 500 TABLET, DELAYED RELEASE ORAL at 22:52

## 2020-09-20 RX ADMIN — ATORVASTATIN CALCIUM 80 MILLIGRAM(S): 80 TABLET, FILM COATED ORAL at 22:09

## 2020-09-20 RX ADMIN — ENOXAPARIN SODIUM 55 MILLIGRAM(S): 100 INJECTION SUBCUTANEOUS at 22:11

## 2020-09-20 RX ADMIN — Medication 0.5 MILLIGRAM(S): at 17:49

## 2020-09-20 RX ADMIN — Medication 650 MILLIGRAM(S): at 22:13

## 2020-09-20 RX ADMIN — Medication 6 MILLIGRAM(S): at 22:09

## 2020-09-20 RX ADMIN — Medication 100 MILLIGRAM(S): at 12:18

## 2020-09-20 RX ADMIN — Medication 5 MILLIGRAM(S): at 22:09

## 2020-09-20 NOTE — PROGRESS NOTE ADULT - SUBJECTIVE AND OBJECTIVE BOX
Patient is a 70y old  Female who presents with a chief complaint of weakness and fall (19 Sep 2020 14:56)    Patient seen and examined at bedside.  no acute events overnight    ALLERGIES:  No Known Allergies        Vital Signs Last 24 Hrs  T(F): 98.5 (20 Sep 2020 10:50), Max: 98.5 (20 Sep 2020 10:50)  HR: 87 (20 Sep 2020 10:50) (82 - 93)  BP: 100/47 (20 Sep 2020 10:50) (99/50 - 138/74)  RR: 14 (20 Sep 2020 10:50) (14 - 17)  SpO2: 100% (20 Sep 2020 10:50) (98% - 100%)  I&O's Summary    19 Sep 2020 07:01  -  20 Sep 2020 07:00  --------------------------------------------------------  IN: 420 mL / OUT: 0 mL / NET: 420 mL    20 Sep 2020 07:01  -  20 Sep 2020 11:14  --------------------------------------------------------  IN: 120 mL / OUT: 0 mL / NET: 120 mL      MEDICATIONS:  acetaminophen   Tablet .. 650 milliGRAM(s) Oral every 6 hours PRN  amLODIPine   Tablet 10 milliGRAM(s) Oral daily  atorvastatin 80 milliGRAM(s) Oral at bedtime  bisacodyl 5 milliGRAM(s) Oral at bedtime  dextrose 40% Gel 15 Gram(s) Oral once PRN  dextrose 5%. 1000 milliLiter(s) IV Continuous <Continuous>  dextrose 50% Injectable 12.5 Gram(s) IV Push once  dextrose 50% Injectable 25 Gram(s) IV Push once  dextrose 50% Injectable 25 Gram(s) IV Push once  diVALproex  milliGRAM(s) Oral at bedtime  enoxaparin Injectable 55 milliGRAM(s) SubCutaneous every 12 hours  glucagon  Injectable 1 milliGRAM(s) IntraMuscular once PRN  insulin lispro (HumaLOG) corrective regimen sliding scale   SubCutaneous three times a day before meals  insulin lispro (HumaLOG) corrective regimen sliding scale   SubCutaneous at bedtime  LORazepam     Tablet 0.5 milliGRAM(s) Oral two times a day  melatonin 6 milliGRAM(s) Oral at bedtime  metoprolol tartrate 25 milliGRAM(s) Oral two times a day  pantoprazole    Tablet 40 milliGRAM(s) Oral before breakfast  thiamine 100 milliGRAM(s) Oral daily  traZODone 200 milliGRAM(s) Oral at bedtime      PHYSICAL EXAM:  General: NAD, Alert but confused at times  ENT: MMM, no oral thrush  Neck: Supple, No JVD  Lungs: Clear to auscultation bilaterally, non labored breathing  Cardio: RRR, S1/S2, No murmurs  Abdomen: Soft, Nontender, Nondistended; Bowel sounds present  Extremities: No cyanosis, No edema    LABS:                        9.0    5.97  )-----------( 276      ( 20 Sep 2020 09:00 )             26.8     09-20    136  |  102  |  11  ----------------------------<  100  3.6   |  28  |  0.46    Ca    8.8      20 Sep 2020 09:00  Mg     1.7     09-19      eGFR if Non African American: 101 mL/min/1.73M2 (09-20-20 @ 09:00)  eGFR if : 117 mL/min/1.73M2 (09-20-20 @ 09:00)                          POCT Blood Glucose.: 101 mg/dL (19 Sep 2020 22:05)  POCT Blood Glucose.: 113 mg/dL (19 Sep 2020 16:57)  POCT Blood Glucose.: 115 mg/dL (19 Sep 2020 11:38)            RADIOLOGY & ADDITIONAL TESTS:    Care Discussed with Consultants/Other Providers:    Patient is a 70y old  Female who presents with a chief complaint of weakness and fall (19 Sep 2020 14:56)    Patient seen and examined at bedside.  no acute events overnight    ALLERGIES:  No Known Allergies    Vital Signs Last 24 Hrs  T(F): 98.5 (20 Sep 2020 10:50), Max: 98.5 (20 Sep 2020 10:50)  HR: 87 (20 Sep 2020 10:50) (82 - 93)  BP: 100/47 (20 Sep 2020 10:50) (99/50 - 138/74)  RR: 14 (20 Sep 2020 10:50) (14 - 17)  SpO2: 100% (20 Sep 2020 10:50) (98% - 100%)  I&O's Summary    19 Sep 2020 07:01  -  20 Sep 2020 07:00  --------------------------------------------------------  IN: 420 mL / OUT: 0 mL / NET: 420 mL    20 Sep 2020 07:01  -  20 Sep 2020 11:14  --------------------------------------------------------  IN: 120 mL / OUT: 0 mL / NET: 120 mL    MEDICATIONS:  acetaminophen   Tablet .. 650 milliGRAM(s) Oral every 6 hours PRN  amLODIPine   Tablet 10 milliGRAM(s) Oral daily  atorvastatin 80 milliGRAM(s) Oral at bedtime  bisacodyl 5 milliGRAM(s) Oral at bedtime  dextrose 40% Gel 15 Gram(s) Oral once PRN  dextrose 5%. 1000 milliLiter(s) IV Continuous <Continuous>  dextrose 50% Injectable 12.5 Gram(s) IV Push once  dextrose 50% Injectable 25 Gram(s) IV Push once  dextrose 50% Injectable 25 Gram(s) IV Push once  diVALproex  milliGRAM(s) Oral at bedtime  enoxaparin Injectable 55 milliGRAM(s) SubCutaneous every 12 hours  glucagon  Injectable 1 milliGRAM(s) IntraMuscular once PRN  insulin lispro (HumaLOG) corrective regimen sliding scale   SubCutaneous three times a day before meals  insulin lispro (HumaLOG) corrective regimen sliding scale   SubCutaneous at bedtime  LORazepam     Tablet 0.5 milliGRAM(s) Oral two times a day  melatonin 6 milliGRAM(s) Oral at bedtime  metoprolol tartrate 25 milliGRAM(s) Oral two times a day  pantoprazole    Tablet 40 milliGRAM(s) Oral before breakfast  thiamine 100 milliGRAM(s) Oral daily  traZODone 200 milliGRAM(s) Oral at bedtime    PHYSICAL EXAM:  General: NAD, Alert but confused at times  ENT: MMM, no oral thrush  Neck: Supple, No JVD  Lungs: Clear to auscultation bilaterally, non labored breathing  Cardio: RRR, S1/S2, No murmurs  Abdomen: Soft, Nontender, Nondistended; Bowel sounds present  Extremities: No cyanosis, No edema    LABS:                        9.0    5.97  )-----------( 276      ( 20 Sep 2020 09:00 )             26.8     09-20    136  |  102  |  11  ----------------------------<  100  3.6   |  28  |  0.46    Ca    8.8      20 Sep 2020 09:00  Mg     1.7     09-19    eGFR if Non African American: 101 mL/min/1.73M2 (09-20-20 @ 09:00)  eGFR if : 117 mL/min/1.73M2 (09-20-20 @ 09:00)    POCT Blood Glucose.: 101 mg/dL (19 Sep 2020 22:05)  POCT Blood Glucose.: 113 mg/dL (19 Sep 2020 16:57)  POCT Blood Glucose.: 115 mg/dL (19 Sep 2020 11:38)    RADIOLOGY & ADDITIONAL TESTS:    Care Discussed with Consultants/Other Providers: GI, Surgery

## 2020-09-20 NOTE — PROGRESS NOTE ADULT - ASSESSMENT
70 female with pmh etoh abuse, t2dm, cad, cva, htn, hld, bipolar d/o, anemia, korsakoff dementia presents with 20lb unintentional weight loss, decreased appetite, found to be anemic course c/b fall after using bathroom on 9/12 resulting in left sided flank/back hematoma.    # Symptomatic Anemia  GI following, cbc now stable  s/p prbcs, fobt negative, EGD showed gastritis, plan for colonoscopy however pt refusing - did not tolerate bowel prep  restarted treatment dose lovenox for hx PE  if no further GI workup dc planning    # KATIE  resolved, Cr at baseline  avoid nephrotoxins, continue to monitor    # H/O PE  failed xarelto  restarted therapeutic lovenox 55 BID    # Unintentional Weight Loss  CEA 4.4  CA 19-9 37  AFP 2.3  palliative following  Ct a/p on 9/14 showed left posterior lateral lower chest/upper abdominal wall hematoma and wall thickening gastric antrum  may require additional malignancy workup as outpatient - follow up final GI recs    # HTN  continue metoprolol and norvasc  blood pressure appears stable, continue to monitor    # Hematoma  left posterior lateral lower chst/upper abd wall hematoma  cbc stable, appreciate surgery consult  appears stable, transfuse hgb < 7    # H/O CVA   continue statin therapy  restart asa 81 daily    # T2DM  blood sugar appears stable  A1C 5.1  continue FS with correction    # Bipolar Disorder  chronic stable  continue divalproex, trazodone    # Korsakoff Dementia  chronic stable condition  supportive care    # ETOH abuse  no signs of withdrawal  continue thiamine    # Severe Protein Calorie Malnutrition  continue [facility] soft diet with protein supplementation    # GOC  full code  HCP brother Madhu [phone]    # Dispo  dc planning if no further GI interventions and cbc stable   70 female with pmh etoh abuse, t2dm, cad, cva, htn, hld, bipolar d/o, anemia, korsakoff dementia presents with 20lb unintentional weight loss, decreased appetite, found to be anemic course c/b fall after using bathroom on 9/12 resulting in left sided flank/back hematoma.    # Symptomatic Anemia  GI following, cbc now stable  s/p pRBC, fobt negative, EGD showed gastritis, plan for colonoscopy however pt refusing - did not tolerate bowel prep  Outpatient GI follow up   restarted treatment dose Lovenox for hx PE  if no further GI workup dc planning    # KATIE  resolved, Cr at baseline  avoid nephrotoxins, continue to monitor    # H/O PE  failed xarelto  restarted therapeutic lovenox 55 BID    # Unintentional Weight Loss  CEA 4.4  CA 19-9 37  AFP 2.3  palliative following  Ct a/p on 9/14 showed left posterior lateral lower chest/upper abdominal wall hematoma and wall thickening gastric antrum  may require additional malignancy workup as outpatient - follow up final GI recs    # HTN  continue metoprolol and norvasc  blood pressure appears stable, continue to monitor    # Hematoma  left posterior lateral lower chst/upper abd wall hematoma  cbc stable, appreciate surgery consult  appears stable, transfuse hgb < 7    # H/O CVA   continue statin therapy  restart asa 81 daily    # T2DM  blood sugar appears stable  A1C 5.1  continue FS with correction    # Bipolar Disorder  chronic stable  continue divalproex, trazodone    # Korsakoff Dementia  chronic stable condition  supportive care    # ETOH abuse  no signs of withdrawal  continue thiamine    # Severe Protein Calorie Malnutrition  continue [facility] soft diet with protein supplementation    # GOC  full code  HCP brother Madhu [phone] updated    # Dispo  dc planning if no further GI interventions and cbc stable

## 2020-09-21 ENCOUNTER — TRANSCRIPTION ENCOUNTER (OUTPATIENT)
Age: 70
End: 2020-09-21

## 2020-09-21 VITALS
OXYGEN SATURATION: 97 % | SYSTOLIC BLOOD PRESSURE: 107 MMHG | DIASTOLIC BLOOD PRESSURE: 53 MMHG | HEART RATE: 69 BPM | RESPIRATION RATE: 16 BRPM | TEMPERATURE: 98 F

## 2020-09-21 LAB
ANION GAP SERPL CALC-SCNC: 5 MMOL/L — SIGNIFICANT CHANGE UP (ref 5–17)
BUN SERPL-MCNC: 9 MG/DL — SIGNIFICANT CHANGE UP (ref 7–23)
CALCIUM SERPL-MCNC: 8.6 MG/DL — SIGNIFICANT CHANGE UP (ref 8.4–10.5)
CHLORIDE SERPL-SCNC: 104 MMOL/L — SIGNIFICANT CHANGE UP (ref 96–108)
CO2 SERPL-SCNC: 30 MMOL/L — SIGNIFICANT CHANGE UP (ref 22–31)
CREAT SERPL-MCNC: 0.43 MG/DL — LOW (ref 0.5–1.3)
GLUCOSE BLDC GLUCOMTR-MCNC: 104 MG/DL — HIGH (ref 70–99)
GLUCOSE BLDC GLUCOMTR-MCNC: 115 MG/DL — HIGH (ref 70–99)
GLUCOSE SERPL-MCNC: 109 MG/DL — HIGH (ref 70–99)
HCT VFR BLD CALC: 27.4 % — LOW (ref 34.5–45)
HGB BLD-MCNC: 9.2 G/DL — LOW (ref 11.5–15.5)
MCHC RBC-ENTMCNC: 32.5 PG — SIGNIFICANT CHANGE UP (ref 27–34)
MCHC RBC-ENTMCNC: 33.6 GM/DL — SIGNIFICANT CHANGE UP (ref 32–36)
MCV RBC AUTO: 96.8 FL — SIGNIFICANT CHANGE UP (ref 80–100)
NRBC # BLD: 0 /100 WBCS — SIGNIFICANT CHANGE UP (ref 0–0)
PLATELET # BLD AUTO: 279 K/UL — SIGNIFICANT CHANGE UP (ref 150–400)
POTASSIUM SERPL-MCNC: 3.6 MMOL/L — SIGNIFICANT CHANGE UP (ref 3.5–5.3)
POTASSIUM SERPL-SCNC: 3.6 MMOL/L — SIGNIFICANT CHANGE UP (ref 3.5–5.3)
RBC # BLD: 2.83 M/UL — LOW (ref 3.8–5.2)
RBC # FLD: 20.2 % — HIGH (ref 10.3–14.5)
SODIUM SERPL-SCNC: 139 MMOL/L — SIGNIFICANT CHANGE UP (ref 135–145)
WBC # BLD: 5.67 K/UL — SIGNIFICANT CHANGE UP (ref 3.8–10.5)
WBC # FLD AUTO: 5.67 K/UL — SIGNIFICANT CHANGE UP (ref 3.8–10.5)

## 2020-09-21 PROCEDURE — 83036 HEMOGLOBIN GLYCOSYLATED A1C: CPT

## 2020-09-21 PROCEDURE — 82272 OCCULT BLD FECES 1-3 TESTS: CPT

## 2020-09-21 PROCEDURE — 83615 LACTATE (LD) (LDH) ENZYME: CPT

## 2020-09-21 PROCEDURE — 84484 ASSAY OF TROPONIN QUANT: CPT

## 2020-09-21 PROCEDURE — 85610 PROTHROMBIN TIME: CPT

## 2020-09-21 PROCEDURE — U0003: CPT

## 2020-09-21 PROCEDURE — 82728 ASSAY OF FERRITIN: CPT

## 2020-09-21 PROCEDURE — 83735 ASSAY OF MAGNESIUM: CPT

## 2020-09-21 PROCEDURE — 86850 RBC ANTIBODY SCREEN: CPT

## 2020-09-21 PROCEDURE — 99239 HOSP IP/OBS DSCHRG MGMT >30: CPT

## 2020-09-21 PROCEDURE — 80053 COMPREHEN METABOLIC PANEL: CPT

## 2020-09-21 PROCEDURE — 93306 TTE W/DOPPLER COMPLETE: CPT

## 2020-09-21 PROCEDURE — 82378 CARCINOEMBRYONIC ANTIGEN: CPT

## 2020-09-21 PROCEDURE — 76376 3D RENDER W/INTRP POSTPROCES: CPT

## 2020-09-21 PROCEDURE — 86901 BLOOD TYPING SEROLOGIC RH(D): CPT

## 2020-09-21 PROCEDURE — 83550 IRON BINDING TEST: CPT

## 2020-09-21 PROCEDURE — 72128 CT CHEST SPINE W/O DYE: CPT

## 2020-09-21 PROCEDURE — 86923 COMPATIBILITY TEST ELECTRIC: CPT

## 2020-09-21 PROCEDURE — 81001 URINALYSIS AUTO W/SCOPE: CPT

## 2020-09-21 PROCEDURE — 70450 CT HEAD/BRAIN W/O DYE: CPT

## 2020-09-21 PROCEDURE — 71045 X-RAY EXAM CHEST 1 VIEW: CPT

## 2020-09-21 PROCEDURE — 82105 ALPHA-FETOPROTEIN SERUM: CPT

## 2020-09-21 PROCEDURE — 72192 CT PELVIS W/O DYE: CPT

## 2020-09-21 PROCEDURE — 82746 ASSAY OF FOLIC ACID SERUM: CPT

## 2020-09-21 PROCEDURE — 85730 THROMBOPLASTIN TIME PARTIAL: CPT

## 2020-09-21 PROCEDURE — 82550 ASSAY OF CK (CPK): CPT

## 2020-09-21 PROCEDURE — 85027 COMPLETE CBC AUTOMATED: CPT

## 2020-09-21 PROCEDURE — 88305 TISSUE EXAM BY PATHOLOGIST: CPT

## 2020-09-21 PROCEDURE — 82607 VITAMIN B-12: CPT

## 2020-09-21 PROCEDURE — 36415 COLL VENOUS BLD VENIPUNCTURE: CPT

## 2020-09-21 PROCEDURE — 87040 BLOOD CULTURE FOR BACTERIA: CPT

## 2020-09-21 PROCEDURE — 88312 SPECIAL STAINS GROUP 1: CPT

## 2020-09-21 PROCEDURE — 83540 ASSAY OF IRON: CPT

## 2020-09-21 PROCEDURE — 74177 CT ABD & PELVIS W/CONTRAST: CPT

## 2020-09-21 PROCEDURE — 36430 TRANSFUSION BLD/BLD COMPNT: CPT

## 2020-09-21 PROCEDURE — 85045 AUTOMATED RETICULOCYTE COUNT: CPT

## 2020-09-21 PROCEDURE — 83605 ASSAY OF LACTIC ACID: CPT

## 2020-09-21 PROCEDURE — 72125 CT NECK SPINE W/O DYE: CPT

## 2020-09-21 PROCEDURE — P9016: CPT

## 2020-09-21 PROCEDURE — 86301 IMMUNOASSAY TUMOR CA 19-9: CPT

## 2020-09-21 PROCEDURE — 80048 BASIC METABOLIC PNL TOTAL CA: CPT

## 2020-09-21 PROCEDURE — 82962 GLUCOSE BLOOD TEST: CPT

## 2020-09-21 PROCEDURE — 85025 COMPLETE CBC W/AUTO DIFF WBC: CPT

## 2020-09-21 PROCEDURE — 83010 ASSAY OF HAPTOGLOBIN QUANT: CPT

## 2020-09-21 PROCEDURE — 87086 URINE CULTURE/COLONY COUNT: CPT

## 2020-09-21 PROCEDURE — 86803 HEPATITIS C AB TEST: CPT

## 2020-09-21 PROCEDURE — 72131 CT LUMBAR SPINE W/O DYE: CPT

## 2020-09-21 PROCEDURE — 99285 EMERGENCY DEPT VISIT HI MDM: CPT | Mod: 25

## 2020-09-21 PROCEDURE — 86769 SARS-COV-2 COVID-19 ANTIBODY: CPT

## 2020-09-21 PROCEDURE — 96365 THER/PROPH/DIAG IV INF INIT: CPT

## 2020-09-21 PROCEDURE — 86900 BLOOD TYPING SEROLOGIC ABO: CPT

## 2020-09-21 PROCEDURE — 93005 ELECTROCARDIOGRAM TRACING: CPT

## 2020-09-21 PROCEDURE — 85379 FIBRIN DEGRADATION QUANT: CPT

## 2020-09-21 RX ORDER — ENOXAPARIN SODIUM 100 MG/ML
55 INJECTION SUBCUTANEOUS
Qty: 0 | Refills: 0 | DISCHARGE
Start: 2020-09-21

## 2020-09-21 RX ORDER — ENOXAPARIN SODIUM 100 MG/ML
80 INJECTION SUBCUTANEOUS
Qty: 0 | Refills: 0 | DISCHARGE

## 2020-09-21 RX ORDER — PANTOPRAZOLE SODIUM 20 MG/1
1 TABLET, DELAYED RELEASE ORAL
Qty: 0 | Refills: 0 | DISCHARGE
Start: 2020-09-21

## 2020-09-21 RX ORDER — METOPROLOL TARTRATE 50 MG
1 TABLET ORAL
Qty: 0 | Refills: 0 | DISCHARGE
Start: 2020-09-21

## 2020-09-21 RX ORDER — ACETAMINOPHEN 500 MG
2 TABLET ORAL
Qty: 0 | Refills: 0 | DISCHARGE
Start: 2020-09-21

## 2020-09-21 RX ADMIN — ENOXAPARIN SODIUM 55 MILLIGRAM(S): 100 INJECTION SUBCUTANEOUS at 11:02

## 2020-09-21 RX ADMIN — Medication 100 MILLIGRAM(S): at 11:02

## 2020-09-21 RX ADMIN — Medication 25 MILLIGRAM(S): at 05:56

## 2020-09-21 RX ADMIN — AMLODIPINE BESYLATE 10 MILLIGRAM(S): 2.5 TABLET ORAL at 05:56

## 2020-09-21 RX ADMIN — PANTOPRAZOLE SODIUM 40 MILLIGRAM(S): 20 TABLET, DELAYED RELEASE ORAL at 05:56

## 2020-09-21 RX ADMIN — Medication 0.5 MILLIGRAM(S): at 05:56

## 2020-09-21 NOTE — DISCHARGE NOTE PROVIDER - HOSPITAL COURSE
70 female with ppm, etoh abuse, bipolar d/o, t2dm, cad, cva, htn, hld, korsakoff dementia presented with unintentional twenty pound weight loss, decreased appetite, admitted with symptomatic anemia hgb found to be 6.2 transfused two units PRBCs and admitted to the hospital.  GI consulted, fobt negative, no overt signs of active bleeding, EGD showed gastritis.  Plans were made for colonoscopy, but patient refused, refused to undergo bowel prep.  Tumor markers were obtained in the setting of recent unintentional weight loss, CEA 4.4,  Ca 19-9 37,  AFP 2.3, CT a/p revealed wall thickening of gastric antrum.  Course c/b fall after using the bathroom on 9/12 resulting on left sided flank/back hematoma.  Surgery consulted, therapeutic lovenox held for 24 hours, transfused one more unit PRBCs, no further acute interventions.  CBC has remained stable.  Clinically stable.  Medically cleared for discharge back to .  Dr. Reyes updated.      Palliative was consulted during hospitalization for GOC delineation - pt remains a full code. 70 female with ppm, etoh abuse, bipolar d/o, t2dm, cad, cva, htn, hld, korsakoff dementia presented with unintentional twenty pound weight loss, decreased appetite, admitted with symptomatic anemia hgb found to be 6.2 transfused two units PRBCs and admitted to the hospital.  GI consulted, fobt negative, no overt signs of active bleeding, EGD showed gastritis.  Plans were made for colonoscopy, but patient did not tolerate bowel prep and did not want to drink it so colonoscopy was unable to be performed. Tumor markers were obtained in the setting of recent unintentional weight loss, CEA 4.4,  Ca 19-9 37,  AFP 2.3, CT a/p revealed wall thickening of gastric antrum.  Course c/b fall after using the bathroom on 9/12 resulting on left sided flank/back hematoma.  Surgery consulted, therapeutic lovenox held for 24 hours, transfused one more unit PRBCs, no further acute interventions.  CBC has remained stable.  Clinically stable.  Medically cleared for discharge back to , GI in agreement.  Dr. Reyes updated.      Madhu, brother, updated of discharge plan.     Palliative was consulted during hospitalization for GOC delineation - pt remains a full code.    Time Spent: 40 minutes

## 2020-09-21 NOTE — DISCHARGE NOTE PROVIDER - NSDCMRMEDTOKEN_GEN_ALL_CORE_FT
acetaminophen 325 mg oral tablet: 2 tab(s) orally every 6 hours, As needed, Mild Pain (1 - 3), Moderate Pain (4 - 6)  amLODIPine 10 mg oral tablet: 1 tab(s) orally once a day  ascorbic acid 500 mg oral tablet: 1 tab(s) orally once a day  aspirin 81 mg oral tablet: 1 tab(s) orally once a day  atorvastatin 80 mg oral tablet: 1 tab(s) orally once a day (at bedtime)  divalproex sodium 500 mg oral tablet, extended release: 1 tab(s) orally once a day (at bedtime)  enoxaparin: 55 milligram(s) orally 2 times a day  LORazepam 0.5 mg oral tablet: 1 tab(s) orally 2 times a day  Melatonin 5 mg oral capsule: 1 cap(s) orally once a day (at bedtime)  metoprolol tartrate 25 mg oral tablet: 1 tab(s) orally 2 times a day  pantoprazole 40 mg oral delayed release tablet: 1 tab(s) orally once a day (before a meal)  potassium chloride 20 mEq oral granule, extended release: 1 application orally once a day  Senna 8.6 mg oral tablet: 1 tab(s) orally once a day (at bedtime)  thiamine 100 mg oral tablet: 1 tab(s) orally once a day  traZODone 100 mg oral tablet: 2 tab(s) orally once a day (at bedtime)

## 2020-09-21 NOTE — PROGRESS NOTE ADULT - REASON FOR ADMISSION
weakness and fall

## 2020-09-21 NOTE — PROGRESS NOTE ADULT - PROVIDER SPECIALTY LIST ADULT
Gastroenterology
Hospitalist
Surgery
Surgery
Hospitalist

## 2020-09-21 NOTE — PROGRESS NOTE ADULT - SUBJECTIVE AND OBJECTIVE BOX
Patient is a 70y old  Female who presents with a chief complaint of weakness and fall (20 Sep 2020 11:13)    Patient seen and examined at bedside.  no acute events overnight    ALLERGIES:  No Known Allergies        Vital Signs Last 24 Hrs  T(F): 98.1 (21 Sep 2020 09:19), Max: 99 (21 Sep 2020 05:52)  HR: 69 (21 Sep 2020 09:19) (69 - 98)  BP: 107/53 (21 Sep 2020 09:19) (90/49 - 115/62)  RR: 16 (21 Sep 2020 09:19) (14 - 17)  SpO2: 97% (21 Sep 2020 09:19) (97% - 100%)  I&O's Summary    20 Sep 2020 07:01  -  21 Sep 2020 07:00  --------------------------------------------------------  IN: 580 mL / OUT: 0 mL / NET: 580 mL      MEDICATIONS:  acetaminophen   Tablet .. 650 milliGRAM(s) Oral every 6 hours PRN  amLODIPine   Tablet 10 milliGRAM(s) Oral daily  atorvastatin 80 milliGRAM(s) Oral at bedtime  bisacodyl 5 milliGRAM(s) Oral at bedtime  dextrose 40% Gel 15 Gram(s) Oral once PRN  dextrose 5%. 1000 milliLiter(s) IV Continuous <Continuous>  dextrose 50% Injectable 12.5 Gram(s) IV Push once  dextrose 50% Injectable 25 Gram(s) IV Push once  dextrose 50% Injectable 25 Gram(s) IV Push once  diVALproex  milliGRAM(s) Oral at bedtime  enoxaparin Injectable 55 milliGRAM(s) SubCutaneous every 12 hours  glucagon  Injectable 1 milliGRAM(s) IntraMuscular once PRN  insulin lispro (HumaLOG) corrective regimen sliding scale   SubCutaneous three times a day before meals  insulin lispro (HumaLOG) corrective regimen sliding scale   SubCutaneous at bedtime  LORazepam     Tablet 0.5 milliGRAM(s) Oral two times a day  melatonin 6 milliGRAM(s) Oral at bedtime  metoprolol tartrate 25 milliGRAM(s) Oral two times a day  pantoprazole    Tablet 40 milliGRAM(s) Oral before breakfast  thiamine 100 milliGRAM(s) Oral daily  traZODone 200 milliGRAM(s) Oral at bedtime      PHYSICAL EXAM:  General: NAD, comfortable  ENT: MMM, no oral thrush  Neck: Supple, No JVD  Lungs: Clear to auscultation bilaterally, non labored breathing  Cardio: RRR, S1/S2, No murmurs  Abdomen: Soft, Nontender, Nondistended; Bowel sounds present  Extremities: No cyanosis, No edema    LABS:                        9.0    5.97  )-----------( 276      ( 20 Sep 2020 09:00 )             26.8     09-20    136  |  102  |  11  ----------------------------<  100  3.6   |  28  |  0.46    Ca    8.8      20 Sep 2020 09:00  Mg     1.7     09-19      eGFR if Non African American: 101 mL/min/1.73M2 (09-20-20 @ 09:00)  eGFR if : 117 mL/min/1.73M2 (09-20-20 @ 09:00)                          POCT Blood Glucose.: 104 mg/dL (21 Sep 2020 07:56)  POCT Blood Glucose.: 120 mg/dL (20 Sep 2020 22:08)  POCT Blood Glucose.: 132 mg/dL (20 Sep 2020 17:46)  POCT Blood Glucose.: 118 mg/dL (20 Sep 2020 12:18)            RADIOLOGY & ADDITIONAL TESTS:    < from: CT Abdomen and Pelvis w/ IV Cont (09.14.20 @ 17:24) >  IMPRESSION:  Leftposterior lateral lower chest/upper abdominal wall hematoma appearing subacute.  Wall thickening gastric antrum may represent antritis.                    JAKE IVAN M.D.,ATTENDING RADIOLOGIST  This document has been electronically signed. Sep 14 2020  6:48PM    < end of copied text >  Care Discussed with Consultants/Other Providers:   Dr. Rich   Patient is a 70y old  Female who presents with a chief complaint of weakness and fall (20 Sep 2020 11:13)    Patient seen and examined at bedside.  no acute events overnight    ALLERGIES:  No Known Allergies      Vital Signs Last 24 Hrs  T(F): 98.1 (21 Sep 2020 09:19), Max: 99 (21 Sep 2020 05:52)  HR: 69 (21 Sep 2020 09:19) (69 - 98)  BP: 107/53 (21 Sep 2020 09:19) (90/49 - 115/62)  RR: 16 (21 Sep 2020 09:19) (14 - 17)  SpO2: 97% (21 Sep 2020 09:19) (97% - 100%)  I&O's Summary    20 Sep 2020 07:01  -  21 Sep 2020 07:00  --------------------------------------------------------  IN: 580 mL / OUT: 0 mL / NET: 580 mL      MEDICATIONS:  acetaminophen   Tablet .. 650 milliGRAM(s) Oral every 6 hours PRN  amLODIPine   Tablet 10 milliGRAM(s) Oral daily  atorvastatin 80 milliGRAM(s) Oral at bedtime  bisacodyl 5 milliGRAM(s) Oral at bedtime  dextrose 40% Gel 15 Gram(s) Oral once PRN  dextrose 5%. 1000 milliLiter(s) IV Continuous <Continuous>  dextrose 50% Injectable 12.5 Gram(s) IV Push once  dextrose 50% Injectable 25 Gram(s) IV Push once  dextrose 50% Injectable 25 Gram(s) IV Push once  diVALproex  milliGRAM(s) Oral at bedtime  enoxaparin Injectable 55 milliGRAM(s) SubCutaneous every 12 hours  glucagon  Injectable 1 milliGRAM(s) IntraMuscular once PRN  insulin lispro (HumaLOG) corrective regimen sliding scale   SubCutaneous three times a day before meals  insulin lispro (HumaLOG) corrective regimen sliding scale   SubCutaneous at bedtime  LORazepam     Tablet 0.5 milliGRAM(s) Oral two times a day  melatonin 6 milliGRAM(s) Oral at bedtime  metoprolol tartrate 25 milliGRAM(s) Oral two times a day  pantoprazole    Tablet 40 milliGRAM(s) Oral before breakfast  thiamine 100 milliGRAM(s) Oral daily  traZODone 200 milliGRAM(s) Oral at bedtime      PHYSICAL EXAM: limited patient did not want to participate in PE  General: NAD, comfortable, thin frail female  ENT: MMM, no oral thrush  Neck: Supple, No JVD  Lungs: Clear to auscultation bilaterally, non labored breathing  Cardio: RRR, S1/S2, No murmurs  Abdomen: Soft, Nontender, Nondistended; Bowel sounds present  Extremities: No cyanosis, No edema  Neuro: A&Ox2 (able to state name, , location), grossly moves upper extremities    LABS:                        9.0    5.97  )-----------( 276      ( 20 Sep 2020 09:00 )             26.8         136  |  102  |  11  ----------------------------<  100  3.6   |  28  |  0.46    Ca    8.8      20 Sep 2020 09:00  Mg     1.7           eGFR if Non African American: 101 mL/min/1.73M2 (20 @ 09:00)  eGFR if : 117 mL/min/1.73M2 (20 @ 09:00)                          POCT Blood Glucose.: 104 mg/dL (21 Sep 2020 07:56)  POCT Blood Glucose.: 120 mg/dL (20 Sep 2020 22:08)  POCT Blood Glucose.: 132 mg/dL (20 Sep 2020 17:46)  POCT Blood Glucose.: 118 mg/dL (20 Sep 2020 12:18)            RADIOLOGY & ADDITIONAL TESTS:    < from: CT Abdomen and Pelvis w/ IV Cont (20 @ 17:24) >  IMPRESSION:  Leftposterior lateral lower chest/upper abdominal wall hematoma appearing subacute.  Wall thickening gastric antrum may represent antritis.                    JAKE IVAN M.D.,ATTENDING RADIOLOGIST  This document has been electronically signed. Sep 14 2020  6:48PM    < end of copied text >  Care Discussed with Consultants/Other Providers:   Dr. Rich

## 2020-09-21 NOTE — DISCHARGE NOTE PROVIDER - NSDCCPCAREPLAN_GEN_ALL_CORE_FT
PRINCIPAL DISCHARGE DIAGNOSIS  Diagnosis: Symptomatic anemia  Assessment and Plan of Treatment: you were admitted to the hospital with severe anemia, we gave you blood, and you were seen by a GI doctor.  He did an EGD that showed gastritis, you declined the colonoscopy.  Your bloodwork has been stable, no signs of active bleeding. You are cleared to go back to [facility].      SECONDARY DISCHARGE DIAGNOSES  Diagnosis: Hematoma  Assessment and Plan of Treatment: stable     PRINCIPAL DISCHARGE DIAGNOSIS  Diagnosis: Symptomatic anemia  Assessment and Plan of Treatment: You were admitted to the hospital with severe anemia.   -You received packed red blood cells with improvement in your anemia  -You were evaluated by a GI doctor, Dr. Rich  -An endoscopy was performed and showed gastritis. You declined the colonoscopy due to not tolerating bowel prep.   -Your bloodwork has been stable, no signs of active bleeding.   -Follow up with your primary care physician within the week  -Follow up with your gastroenterologist within the week      SECONDARY DISCHARGE DIAGNOSES  Diagnosis: Unintentional weight loss  Assessment and Plan of Treatment: You had a 20lb weight loss.   Add Glucerna shakes twice daily with your meals  -Follow up with your primary care physician within the week. You may need to follow up hematology outpatient if weight loss progresses

## 2020-09-21 NOTE — PROGRESS NOTE ADULT - ATTENDING COMMENTS
as above
as above
Patient seen and examined . Agree with assessment and plan as above.    She has no acute complaints.  Continues to be resistant to consuming bowel prep.  No abdominal pain.    VSS.  Abdomen soft, nontender    Monitor Hgb/Hct  Continue efforts for bowel prep.
Patient seen and examined.  Agree with assessment and plan as above.    EGD today showed gastritis, otherwise unremarkable.  Due to poor bowel prep could not proceed with colonoscopy.  She does not have any complaints at present time.    VSS.  Abdomen soft, nontender    Will encourage patient to consume bowel preparation and plan for colonoscopy of Friday
I have personally seen and examined patient on the above date.  I discussed the case with BAIRON Chapa and I agree with findings and plan as detailed per note above, which I have amended where appropriate.    #Symptomatic Anemia: h&h remains stable s/p PRBC transfusion.  will start DVT ppx now, consider resuming full dose AC if hb continues to remain stable.  GI follow up for colonoscopy  Hematology eval  Monitor hematoma size, monitor cbc. keep hb >7
Patient seen and examined.  Agree with assessment and plan as above.    She is adamantly opposed to taking bowel preparation despite efforts by nursing.  No abdominal pain, nausea or vomiting.  Hgb/Hct has remained stable.    VSS.  Abdomen soft, nontender to palpation    Conservative management.  Monitor Hgb/Hct.  Diet as tolerated
I have personally seen and examined patient on the above date. I discussed the case with LILO Caldwell and I agree with the findings and plan as detailed per note above, which I have amended where appropriate.
I have personally seen and examined patient on the above date.  I discussed the case with Jona Briseno NP and I agree with findings and plan as detailed per note above, which I have amended where appropriate.
I have personally seen and examined patient on the above date. I discussed the case with BAIRON Briseno and I agree with the findings and plan as detailed per note above, which I have amended where appropriate.
I have personally seen and examined patient on the above date.  I discussed the case with BAIRON Tenorio and I agree with findings and plan as detailed per note above, which I have amended where appropriate.      Symptomatic anemia: Inappropriate response to 1U PRBC, will give another 1U PRBC today, repeat CBC in AM. Repeat FOBT - would suggest to get serial stool samples to confirm truly negative.     Elevated CEA / : only borderline elevated, but in setting of weight loss and having never had screening oncologic exams, patient will need colonoscopy (inpatient vs outpatient). GI consulted. Additionally, will get CT A/P with IV contrast to look for occult tumor - will order today.    Case d/w Dr Short - palliative care team - patient's HCP (brother) states that patient is full code at this time

## 2020-09-21 NOTE — DISCHARGE NOTE NURSING/CASE MANAGEMENT/SOCIAL WORK - PATIENT PORTAL LINK FT
You can access the FollowMyHealth Patient Portal offered by [facility] by registering at the following website: [email or web address]. By joining [facility]’s FollowMyHealth portal, you will also be able to view your health information using other applications (apps) compatible with our system.

## 2020-09-21 NOTE — DISCHARGE NOTE PROVIDER - CARE PROVIDER_API CALL
Reyes, John A  INTERNAL MEDICINE  [address] [place] [address], [address]  [place] [address]  Phone: [phone]  Fax: [phone]  Follow Up Time:     VEGA BARNES  GASTROENTEROLOGY  [address] [facility], [place] [address]  Phone: [phone]  Fax: [phone]  Follow Up Time:

## 2020-09-21 NOTE — PROGRESS NOTE ADULT - ASSESSMENT
70 female with pmh etoh abuse, t2dm, cad, cva, htn, hld, bipolar d/o, anemia, korsakoff dementia presents with 20lb unintentional weight loss, decreased appetite, found to be anemic course c/b fall after using bathroom on 9/12 resulting in left sided flank/back hematoma.    # Symptomatic Anemia  GI following, cbc now stable  s/p pRBC, fobt negative, EGD showed gastritis, plan for colonoscopy however pt refusing - did not tolerate bowel prep  Outpatient GI follow up   restarted treatment dose Lovenox for hx PE  No further plans for GI interventions    # KATIE  resolved, Cr at baseline  avoid nephrotoxins, continue to monitor    # H/O PE  failed xarelto  restarted therapeutic lovenox 55 BID    # Unintentional Weight Loss  CEA 4.4  CA 19-9 37  AFP 2.3  palliative following  Ct a/p on 9/14 showed left posterior lateral lower chest/upper abdominal wall hematoma and wall thickening gastric antrum  may require additional malignancy workup as outpatient - follow up final GI recs    # HTN  continue metoprolol and norvasc  blood pressure appears stable, continue to monitor    # Hematoma  left posterior lateral lower chst/upper abd wall hematoma  cbc stable, appreciate surgery consult  appears stable, transfuse hgb < 7    # H/O CVA   continue statin therapy  restart asa 81 daily    # T2DM  blood sugar appears stable  A1C 5.1  continue FS with correction    # Bipolar Disorder  chronic stable  continue divalproex, trazodone    # Korsakoff Dementia  chronic stable condition  supportive care    # ETOH abuse  no signs of withdrawal  continue thiamine    # Severe Protein Calorie Malnutrition  continue [facility] soft diet with protein supplementation    # GOC  full code  HCP brother Madhu [phone] updated    # Dispo  dc planning back to [facility]    70 female with pmh etoh abuse, t2dm, cad, cva, htn, hld, bipolar d/o, anemia, korsakoff dementia presents with 20lb unintentional weight loss, decreased appetite, found to be anemic course c/b fall after using bathroom on 9/12 resulting in left sided flank/back hematoma.    # Symptomatic Anemia - likely due to hematoma  GI following, cbc now stable  s/p pRBC, FOBT negative, EGD showed gastritis, plan for colonoscopy however pt refusing - did not tolerate bowel prep  GI consulted, recommendations appreciated  restarted treatment dose Lovenox for hx PE. H/H stablized  No further plans for GI interventions, GI standpoint stable for discharge    # Hematoma  left posterior lateral lower chest/upper abd wall hematoma  cbc stable, appreciate surgery recommendations   stable, transfuse hgb < 7    # KATIE  resolved, Cr at baseline  avoid nephrotoxins, continue to monitor    # H/O PE  failed xarelto  restarted therapeutic Lovenox 55 BID    # Unintentional Weight Loss  CEA 4.4  CA 19-9 37  AFP 2.3  palliative following  Ct a/p on 9/14 showed left posterior lateral lower chest/upper abdominal wall hematoma and wall thickening gastric antrum    # HTN  low/normal  continue metoprolol and norvasc  Monitor vitals    # H/O CVA   continue statin therapy  restart asa 81 daily    # T2DM  A1C 5.1  continue FS with correction  blood glucose goal 100-180    # Bipolar Disorder  chronic stable  continue divalproex, trazodone    # Korsakoff Dementia  chronic stable condition  supportive care    # ETOH abuse  no signs of withdrawal  continue thiamine    # Severe Protein Calorie Malnutrition  continue mechanical soft diet with protein supplementation  Glucerna BID    # GOC  full code  HCP brother Madhu [phone] updated    # Dispo  dc planning back to     70 female with pmh etoh abuse, t2dm, cad, cva, htn, hld, bipolar d/o, anemia, korsakoff dementia presents with 20lb unintentional weight loss, decreased appetite, found to be anemic course c/b fall after using bathroom on 9/12 resulting in left sided flank/back hematoma.    # Symptomatic Anemia, acute blood loss anemia - likely due to hematoma  GI following, cbc now stable  s/p pRBC, FOBT negative, EGD showed gastritis, plan for colonoscopy however pt refusing - did not tolerate bowel prep  GI consulted, recommendations appreciated  restarted treatment dose Lovenox for hx PE. H/H stablized  No further plans for GI interventions, GI standpoint stable for discharge    # Hematoma  left posterior lateral lower chest/upper abd wall hematoma  cbc stable, appreciate surgery recommendations   stable, transfuse hgb < 7    # KATIE  resolved, Cr at baseline  avoid nephrotoxins, continue to monitor    # H/O PE  failed xarelto  restarted therapeutic Lovenox 55 BID    # Unintentional Weight Loss  CEA 4.4  CA 19-9 37  AFP 2.3  palliative following  Ct a/p on 9/14 showed left posterior lateral lower chest/upper abdominal wall hematoma and wall thickening gastric antrum    # HTN  low/normal  continue metoprolol and norvasc  Monitor vitals    # H/O CVA   continue statin therapy  restart asa 81 daily    # T2DM  A1C 5.1  continue FS with correction  blood glucose goal 100-180    # Bipolar Disorder  chronic stable  continue divalproex, trazodone    # Korsakoff Dementia  chronic stable condition  supportive care    # ETOH abuse  no signs of withdrawal  continue thiamine    # Severe Protein Calorie Malnutrition  continue mechanical soft diet with protein supplementation  Glucerna BID    # GOC  full code  HCP brother Madhu [phone] updated    # Dispo  dc planning back to

## 2020-09-21 NOTE — CHART NOTE - NSCHARTNOTEFT_GEN_A_CORE
Nutrition Follow Up Note  Hospital Course (Per Electronic Medical Record):   Source: Medical Record [X] Patient [X] Family [X] Nursing Staff [X]     Diet: Mechanical Soft     Patient noted to refuse prep for colonoscopy , H/H noted stable . Po intake noted 50-75% , noted requiring total assistance  with feeding . patient with evidence of severe malnutrition , suggest adding Glucerna Shake BID .     Current Weight: (9/21) 121.6/55.2kg- stable weight since admission.     Pertinent Medications: MEDICATIONS  (STANDING):  amLODIPine   Tablet 10 milliGRAM(s) Oral daily  atorvastatin 80 milliGRAM(s) Oral at bedtime  bisacodyl 5 milliGRAM(s) Oral at bedtime  dextrose 5%. 1000 milliLiter(s) (50 mL/Hr) IV Continuous <Continuous>  dextrose 50% Injectable 12.5 Gram(s) IV Push once  dextrose 50% Injectable 25 Gram(s) IV Push once  dextrose 50% Injectable 25 Gram(s) IV Push once  diVALproex  milliGRAM(s) Oral at bedtime  enoxaparin Injectable 55 milliGRAM(s) SubCutaneous every 12 hours  insulin lispro (HumaLOG) corrective regimen sliding scale   SubCutaneous three times a day before meals  insulin lispro (HumaLOG) corrective regimen sliding scale   SubCutaneous at bedtime  LORazepam     Tablet 0.5 milliGRAM(s) Oral two times a day  melatonin 6 milliGRAM(s) Oral at bedtime  metoprolol tartrate 25 milliGRAM(s) Oral two times a day  pantoprazole    Tablet 40 milliGRAM(s) Oral before breakfast  thiamine 100 milliGRAM(s) Oral daily  traZODone 200 milliGRAM(s) Oral at bedtime    MEDICATIONS  (PRN):  acetaminophen   Tablet .. 650 milliGRAM(s) Oral every 6 hours PRN Mild Pain (1 - 3), Moderate Pain (4 - 6)  dextrose 40% Gel 15 Gram(s) Oral once PRN Blood Glucose LESS THAN 70 milliGRAM(s)/deciliter  glucagon  Injectable 1 milliGRAM(s) IntraMuscular once PRN Glucose LESS THAN 70 milligrams/deciliter      Pertinent Labs:  09-20 Na136 mmol/L Glu 100 mg/dL<H> K+ 3.6 mmol/L Cr  0.46 mg/dL<L> BUN 11 mg/dL 09-15 Alb 2.1 g/dL<L>        Skin: ecchymotic     Edema: none    Last BM: on (9/17)    Estimated Needs:   [X] No Change since Previous Assessment      Previous Nutrition Diagnosis: Severe malnutrition    Nutrition Diagnosis is [X] Ongoing         New Nutrition Diagnosis: [X] Not Applicable    1. Recommend adding Po supplement        Monitoring & Evaluation: will monitor:  [X] Weights   [X] PO Intake   [X] Follow Up (Per Protocol)  [X] Tolerance to Diet Prescription       RD to follow as per Nutrition protocol  Radha Barboza RDN

## 2020-11-08 ENCOUNTER — EMERGENCY (EMERGENCY)
Facility: HOSPITAL | Age: 70
LOS: 1 days | Discharge: HOME | End: 2020-11-08
Attending: EMERGENCY MEDICINE | Admitting: EMERGENCY MEDICINE
Payer: MEDICARE

## 2020-11-08 VITALS
DIASTOLIC BLOOD PRESSURE: 68 MMHG | TEMPERATURE: 98 F | HEIGHT: 62 IN | OXYGEN SATURATION: 99 % | HEART RATE: 78 BPM | SYSTOLIC BLOOD PRESSURE: 112 MMHG | RESPIRATION RATE: 18 BRPM | WEIGHT: 119.93 LBS

## 2020-11-08 LAB
BASOPHILS # BLD AUTO: 0.04 K/UL — SIGNIFICANT CHANGE UP (ref 0–0.2)
BASOPHILS NFR BLD AUTO: 0.6 % — SIGNIFICANT CHANGE UP (ref 0–2)
EOSINOPHIL # BLD AUTO: 0.05 K/UL — SIGNIFICANT CHANGE UP (ref 0–0.5)
EOSINOPHIL NFR BLD AUTO: 0.7 % — SIGNIFICANT CHANGE UP (ref 0–6)
HCT VFR BLD CALC: 32.4 % — LOW (ref 34.5–45)
HGB BLD-MCNC: 10.9 G/DL — LOW (ref 11.5–15.5)
IMM GRANULOCYTES NFR BLD AUTO: 0.3 % — SIGNIFICANT CHANGE UP (ref 0–1.5)
LYMPHOCYTES # BLD AUTO: 1.36 K/UL — SIGNIFICANT CHANGE UP (ref 1–3.3)
LYMPHOCYTES # BLD AUTO: 19.7 % — SIGNIFICANT CHANGE UP (ref 13–44)
MCHC RBC-ENTMCNC: 33.6 GM/DL — SIGNIFICANT CHANGE UP (ref 32–36)
MCHC RBC-ENTMCNC: 35 PG — HIGH (ref 27–34)
MCV RBC AUTO: 104.2 FL — HIGH (ref 80–100)
MONOCYTES # BLD AUTO: 0.61 K/UL — SIGNIFICANT CHANGE UP (ref 0–0.9)
MONOCYTES NFR BLD AUTO: 8.8 % — SIGNIFICANT CHANGE UP (ref 2–14)
NEUTROPHILS # BLD AUTO: 4.83 K/UL — SIGNIFICANT CHANGE UP (ref 1.8–7.4)
NEUTROPHILS NFR BLD AUTO: 69.9 % — SIGNIFICANT CHANGE UP (ref 43–77)
NRBC # BLD: 0 /100 WBCS — SIGNIFICANT CHANGE UP (ref 0–0)
PLATELET # BLD AUTO: 210 K/UL — SIGNIFICANT CHANGE UP (ref 150–400)
RBC # BLD: 3.11 M/UL — LOW (ref 3.8–5.2)
RBC # FLD: 15.7 % — HIGH (ref 10.3–14.5)
WBC # BLD: 6.91 K/UL — SIGNIFICANT CHANGE UP (ref 3.8–10.5)
WBC # FLD AUTO: 6.91 K/UL — SIGNIFICANT CHANGE UP (ref 3.8–10.5)

## 2020-11-08 PROCEDURE — 73552 X-RAY EXAM OF FEMUR 2/>: CPT

## 2020-11-08 PROCEDURE — 93005 ELECTROCARDIOGRAM TRACING: CPT

## 2020-11-08 PROCEDURE — 72170 X-RAY EXAM OF PELVIS: CPT | Mod: 26

## 2020-11-08 PROCEDURE — 71045 X-RAY EXAM CHEST 1 VIEW: CPT | Mod: 26

## 2020-11-08 PROCEDURE — 99284 EMERGENCY DEPT VISIT MOD MDM: CPT | Mod: CS

## 2020-11-08 PROCEDURE — 73552 X-RAY EXAM OF FEMUR 2/>: CPT | Mod: 26,LT

## 2020-11-08 PROCEDURE — 87635 SARS-COV-2 COVID-19 AMP PRB: CPT

## 2020-11-08 PROCEDURE — 70450 CT HEAD/BRAIN W/O DYE: CPT | Mod: 26

## 2020-11-08 PROCEDURE — 85025 COMPLETE CBC W/AUTO DIFF WBC: CPT

## 2020-11-08 PROCEDURE — 82962 GLUCOSE BLOOD TEST: CPT

## 2020-11-08 PROCEDURE — 80053 COMPREHEN METABOLIC PANEL: CPT

## 2020-11-08 PROCEDURE — 93010 ELECTROCARDIOGRAM REPORT: CPT

## 2020-11-08 PROCEDURE — 70450 CT HEAD/BRAIN W/O DYE: CPT

## 2020-11-08 PROCEDURE — 36415 COLL VENOUS BLD VENIPUNCTURE: CPT

## 2020-11-08 PROCEDURE — 99284 EMERGENCY DEPT VISIT MOD MDM: CPT | Mod: 25

## 2020-11-08 PROCEDURE — 73590 X-RAY EXAM OF LOWER LEG: CPT | Mod: 26,LT

## 2020-11-08 PROCEDURE — 71045 X-RAY EXAM CHEST 1 VIEW: CPT

## 2020-11-08 PROCEDURE — 73590 X-RAY EXAM OF LOWER LEG: CPT

## 2020-11-08 PROCEDURE — 72170 X-RAY EXAM OF PELVIS: CPT

## 2020-11-08 NOTE — ED PROVIDER NOTE - MUSCULOSKELETAL, MLM
Spine appears normal, range of motion is not limited, LLE no gross deformity, pain with palpation all along LLE, but not consistently. pelvis stable, nontender

## 2020-11-08 NOTE — ED PROVIDER NOTE - OBJECTIVE STATEMENT
pt sent from NH for fall tonight, unwitnessed. pt does say she fell an hr PTA, but unable to give more detail. does not remember. unable to recall if she had LOC.  pt only c/o L leg pain/spasm for 5 days.  denies headache, chset pain, abd pain, sob, fever.

## 2020-11-08 NOTE — ED PROVIDER NOTE - CLINICAL SUMMARY MEDICAL DECISION MAKING FREE TEXT BOX
71 yo F from NH with h/o stroke, dementia, korsakoff syndrome, PE taking eliquis sent for unwitnessed fall. pt only c/o LLE pain for 5 days. exam not consistent. pt states LLE is paralyzed but will lift LLE up on exam. will check xray LLE r/o fx. check labs, ekg, ?syncope. check ct head r/o bleed/trauma. 71 yo F from NH with h/o stroke, dementia, korsakoff syndrome, PE taking eliquis sent for unwitnessed fall. pt only c/o LLE pain for 5 days. exam not consistent. pt states LLE is paralyzed but will lift LLE up on exam. will check xray LLE r/o fx. check labs, ekg, ?syncope. check ct head r/o bleed/trauma.    0020: CT head, chset , pelvis LLE, xrays unremarkable, no trauma. ekg normal. labs unremarkable. will dc to NH if covid neg (pending)

## 2020-11-08 NOTE — ED PROVIDER NOTE - CARE PLAN
Principal Discharge DX:	Pain of left lower extremity  Secondary Diagnosis:	Fall at nursing home, initial encounter

## 2020-11-08 NOTE — ED PROVIDER NOTE - NEUROLOGICAL, MLM
Alert and oriented, no focal deficits, no motor or sensory deficits. 5/5 strength all 4 extremities c nl gross sensation

## 2020-11-08 NOTE — ED PROVIDER NOTE - CONSTITUTIONAL, MLM
normal... Well appearing, awake, alert, oriented to person, place, time/situation and in no apparent distress. mildly confused

## 2020-11-08 NOTE — ED PROVIDER NOTE - PATIENT PORTAL LINK FT
You can access the FollowMyHealth Patient Portal offered by [facility] by registering at the following website: [email or web address]. By joining [employer]’s FollowMyHealth portal, you will also be able to view your health information using other applications (apps) compatible with our system.

## 2020-11-08 NOTE — ED PROVIDER NOTE - PSYCHIATRIC, MLM
Alert and oriented to person, place, time/situation. . no apparent risk to self or others. mildly confused, agitated

## 2020-11-08 NOTE — ED PROVIDER NOTE - NSFOLLOWUPINSTRUCTIONS_ED_ALL_ED_FT
Follow Up in 1-3 Days with your own doctor or with  [facility]  Family Medicine  [address] [place] [address]  Phone: [phone]      Fall Prevention for Older Adults    WHAT YOU NEED TO KNOW:    As you age, your muscles weaken and your risk for falls increases. Your risk also increases if you take medicines that make you sleepy or dizzy. You may also be at risk if you have vision or joint problems, have low blood pressure, or are not active.    DISCHARGE INSTRUCTIONS:    Call 911 or have someone else call if:   •You have fallen and are unconscious.      •You have fallen and cannot move part of your body.      Contact your healthcare provider if:   •You have fallen and have pain or a headache.      •You have questions or concerns about your condition or care.      Fall prevention tips:   •Stay active. Exercise can help strengthen your muscles and improve your balance. Your healthcare provider may recommend water aerobics, walking, or Jalen Chi. He or she may also recommend physical therapy to improve your coordination. Never start an exercise program without asking your healthcare provider first.  Water Aerobics for Seniors       Jalen Chi for Seniors           •Wear shoes that fit well and have soles that . Wear shoes both inside and outside. Use slippers with good . Avoid shoes with high heels.      •Use assistive devices as directed. Your healthcare provider may suggest that you use a cane or walker to help you keep your balance. You may need to have grab bars put in your bathroom near the toilet or in the shower.      •Stand or sit up slowly. This may help you keep your balance and prevent falls.      •Wear a personal alarm. This is a device that allows you to call 911 if you need help. Ask for more information on personal alarms.      •Manage your medical conditions.  Keep all appointments with your healthcare providers. Visit your eye doctor as directed.      Home safety tips:     Fall Prevention for Seniors     •Add items to prevent falls in the bathroom. Put nonslip strips on your bath or shower floor to prevent you from slipping. Use a bath mat if you do not have carpet in the bathroom. This will prevent you from falling when you step out of the bath or shower. Use a shower seat so you do not need to stand while you shower. Sit on the toilet or a chair in your bathroom to dry yourself and put on clothing. This will prevent you from losing your balance from drying or dressing yourself while you are standing.      •Keep paths clear. Remove books, shoes, and other objects from walkways and stairs. Place cords for telephones and lamps out of the way so that you do not need to walk over them. Tape them down if you cannot move them. Remove small rugs. If you cannot remove a rug, secure it with double-sided tape. This will prevent you from tripping.      •Install bright lights in your home. Use night lights to help light paths to the bathroom or kitchen. Always turn on the light before you start walking.      •Keep items you use often on shelves within reach. Do not use a step stool to help you reach an item.      •Paint or place reflective tape on the edges of your stairs. This will help you see the stairs better.      Follow up with your healthcare provider as directed: Write down your questions so you remember to ask them during your visits.       Leg Pain    WHAT YOU NEED TO KNOW:    Leg pain may be caused by a variety of health conditions. Your tests did not show any broken bones or blood clots.    DISCHARGE INSTRUCTIONS:    Return to the emergency department if:   •You have a fever.      •Your leg starts to swell.      •Your leg pain gets worse.      •You have numbness or tingling in your leg or toes.      •You cannot put any weight on or move your leg.      Contact your healthcare provider if:   •Your pain does not decrease, even after treatment.      •You have questions or concerns about your condition or care.      Medicines:   •NSAIDs, such as ibuprofen, help decrease swelling, pain, and fever. This medicine is available with or without a doctor's order. NSAIDs can cause stomach bleeding or kidney problems in certain people. If you take blood thinner medicine, always ask your healthcare provider if NSAIDs are safe for you. Always read the medicine label and follow directions.      •Take your medicine as directed. Contact your healthcare provider if you think your medicine is not helping or if you have side effects. Tell him of her if you are allergic to any medicine. Keep a list of the medicines, vitamins, and herbs you take. Include the amounts, and when and why you take them. Bring the list or the pill bottles to follow-up visits. Carry your medicine list with you in case of an emergency.      Follow up with your healthcare provider as directed: You may need more tests to find the cause of your leg pain. You may need to see an orthopedic specialist or a physical therapist. Write down your questions so you remember to ask them during your visits.    Manage your leg pain:   •Rest your injured leg so that it can heal. You may need an immobilizer, brace, or splint to limit the movement of your leg. You may need to avoid putting any weight on your leg for at least 48 hours. Return to normal activities as directed.      •Ice the injury for 20 minutes every 4 hours for up to 24 hours, or as directed. Use an ice pack, or put crushed ice in a plastic bag. Cover it with a towel to protect your skin. Ice helps prevent tissue damage and decreases swelling and pain.      •Elevate your injured leg above the level of your heart as often as you can. This will help decrease swelling and pain. If possible, prop your leg on pillows or blankets to keep the area elevated comfortably.       •Use assistive devices as directed. You may need to use a cane or crutches. Assistive devices help decrease pain and pressure on your leg when you walk. Ask your healthcare provider for more information about assistive devices and how to use them correctly.      •Maintain a healthy weight. Extra body weight can cause pressure and pain in your hip, knee, and ankle joints. Ask your healthcare provider how much you should weigh. Ask him to help you create a weight loss plan if you are overweight.

## 2020-11-09 VITALS
OXYGEN SATURATION: 99 % | DIASTOLIC BLOOD PRESSURE: 69 MMHG | RESPIRATION RATE: 18 BRPM | SYSTOLIC BLOOD PRESSURE: 110 MMHG | HEART RATE: 75 BPM

## 2020-11-09 LAB
ALBUMIN SERPL ELPH-MCNC: 3.1 G/DL — LOW (ref 3.3–5)
ALP SERPL-CCNC: 44 U/L — SIGNIFICANT CHANGE UP (ref 40–120)
ALT FLD-CCNC: 18 U/L — SIGNIFICANT CHANGE UP (ref 10–45)
ANION GAP SERPL CALC-SCNC: 7 MMOL/L — SIGNIFICANT CHANGE UP (ref 5–17)
AST SERPL-CCNC: 22 U/L — SIGNIFICANT CHANGE UP (ref 10–40)
BILIRUB SERPL-MCNC: 0.8 MG/DL — SIGNIFICANT CHANGE UP (ref 0.2–1.2)
BUN SERPL-MCNC: 23 MG/DL — SIGNIFICANT CHANGE UP (ref 7–23)
CALCIUM SERPL-MCNC: 10.2 MG/DL — SIGNIFICANT CHANGE UP (ref 8.4–10.5)
CHLORIDE SERPL-SCNC: 103 MMOL/L — SIGNIFICANT CHANGE UP (ref 96–108)
CO2 SERPL-SCNC: 29 MMOL/L — SIGNIFICANT CHANGE UP (ref 22–31)
CREAT SERPL-MCNC: 0.78 MG/DL — SIGNIFICANT CHANGE UP (ref 0.5–1.3)
GLUCOSE SERPL-MCNC: 142 MG/DL — HIGH (ref 70–99)
POTASSIUM SERPL-MCNC: 4.6 MMOL/L — SIGNIFICANT CHANGE UP (ref 3.5–5.3)
POTASSIUM SERPL-SCNC: 4.6 MMOL/L — SIGNIFICANT CHANGE UP (ref 3.5–5.3)
PROT SERPL-MCNC: 6.9 G/DL — SIGNIFICANT CHANGE UP (ref 6–8.3)
SARS-COV-2 RNA SPEC QL NAA+PROBE: SIGNIFICANT CHANGE UP
SODIUM SERPL-SCNC: 139 MMOL/L — SIGNIFICANT CHANGE UP (ref 135–145)

## 2020-11-09 NOTE — ED ADULT NURSE NOTE - OBJECTIVE STATEMENT
Pt is alert, brought to the ER from [facility] due to fall. Pt states she has pain on her left calf from the fall and at the same time feeling "numb' for 5 days now. Pt is able to move all upper and lower extremities. FS was normal upon Triage. Denies LOC or nausea or vomiting or chest pain/SOB.

## 2020-11-09 NOTE — ED ADULT NURSE NOTE - CHPI ED NUR SYMPTOMS NEG
no fever/no abrasion/no deformity/no tingling/no loss of consciousness/no bleeding/no vomiting/no weakness

## 2020-11-09 NOTE — ED ADULT NURSE NOTE - CHIEF COMPLAINT QUOTE
Pt is from [facility] who was sent here for a fall today. p[t verbalized her left leg is numb for 5 days now and she has pain on the same leg"

## 2020-11-12 DIAGNOSIS — M79.662 PAIN IN LEFT LOWER LEG: ICD-10-CM

## 2020-11-18 NOTE — PATIENT PROFILE ADULT - NSPROGENSOURCEINFO_GEN_A_NUR
patient
Additional Notes: Patient consent was obtained to proceed with the visit and recommended plan of care after discussion of all risks and benefits, including the risks of COVID-19 exposure.
Detail Level: Simple

## 2021-08-13 NOTE — PATIENT PROFILE ADULT - NSPROGENDIFFINTUB_GEN_A_NUR
Subjective:       Chief Complaint  The patient presents for follow up of diabetesand high cholesterol. Chronic back pain due to spinal stenosis        HPI  Regis Mahan is a 77 y.o. male seen for follow up of diabetes. Healso has  hyperlipidemia. Diabetes no significant medication side effects noted, well controlled, on Glucotrol XL 5 mg, hyperlipidemia borderline controlled, not on a statin and pt has significant h/o tobacco use. The 10-year ASCVD risk score (Pawan Diaz., et al., 2013) is: 52% would benefit from being on a statin but he is reluctant to add more medications to his regimen. Patient is followed by cardiology for aortic stenosis. Diet and Lifestyle: not attempting to follow a low fat, low cholesterol diet, does not rigorously follow a diabetic diet, sedentary    Diabetic Review of Systems - home glucose monitoring: is not performed. Other symptoms and concerns:  Patient does have peripheral neuropathy from diabetes and possibly carpal tunnel syndrome in his hands. Patient would need EMG with neurology to further evaluate his hands which he does not want to do at this time. Patient according to his wife who is with him today says he has problems with his memory and the concern is that he may have a early signs of Alzheimer's dementia. Discussed with patient and wife that we can refer him for neuropsych evaluation to see how significant problems this is. Patient also has a history of cluster headaches in the past which were treated with home oxygen. The headaches have resolved and he no longer has oxygen at home. He has seen neurology for the headaches but currently is not seeing a neurologist.  Patient also has a history of spinal stenosis and neurogenic claudication. He has had spinal surgery in the past which has helped but he still uses Percocet about 2 tablets he tells me a month it sounds about correct with his refill history seen on [facility].   We will do a urine drug screen in the near future and new pain contract. Discussed the patient's BMI with him. The BMI follow up plan is as follows: I have counseled this patient on diet and exercise regimens. Current Outpatient Medications   Medication Sig    glipiZIDE SR (GLUCOTROL XL) 5 mg CR tablet Take 1 Tablet by mouth daily.  oxyCODONE-acetaminophen (Percocet) 5-325 mg per tablet Take  by mouth every four (4) hours as needed for Pain.  soft lens rinse,store solution (UNISOL PLUS) by Does Not Apply route.  aspirin-acetaminophen-caffeine (EXCEDRIN ES) 250-250-65 mg per tablet Take 1 Tab by mouth every six (6) hours as needed for Headache. Indications: Patient takes 4 - 6 tablets daily    SUMAtriptan (IMITREX) 100 mg tablet 1 tablet by mouth as needed for headache, may repeat 1 tablet if headache is not resolved in 2 hours. Maximum of 200 mg per 24 hours. (Patient not taking: Reported on 6/2/2021)    tadalafil (CIALIS) 20 mg tablet 1 tablet at least 1 hour before intercourse (Patient not taking: Reported on 6/2/2021)    diphenhydrAMINE HCl (WAL-FRED, DIPHENHYDRAMINE,) 25 mg TbDi Take 25 mg by mouth nightly. (Patient not taking: Reported on 6/2/2021)    hydrocortisone (CORTISONE) 1 % topical cream Apply to face and scalp twice per day for two weeks (Patient not taking: Reported on 6/2/2021)     No current facility-administered medications for this visit.            Review of Systems  Respiratory: negative for dyspnea on exertion  Cardiovascular: negative for chest pain    Objective:     Visit Vitals  /71 (BP 1 Location: Right arm, BP Patient Position: Sitting, BP Cuff Size: Large adult)   Pulse 63   Temp 97.1 °F (36.2 °C) (Temporal)   Resp 16   Wt 182 lb 12.8 oz (82.9 kg)   SpO2 98%   BMI 26.99 kg/m²        Eyes - pupils equal and reactive, extraocular eye movements intact  Ears -decreased hearing  Chest - clear to auscultation, no wheezes, rales or rhonchi, symmetric air entry  Heart - normal rate and regular rhythm, systolic murmur 3/6 at 2nd left intercostal space  Extremities - no pedal edema noted      Results for orders placed or performed during the hospital encounter of 08/10/21   HEMOGLOBIN A1C W/O EAG   Result Value Ref Range    Hemoglobin A1c 6.7 (H) 4.2 - 5.6 %   METABOLIC PANEL, COMPREHENSIVE   Result Value Ref Range    Sodium 138 136 - 145 mmol/L    Potassium 4.2 3.5 - 5.5 mmol/L    Chloride 103 100 - 111 mmol/L    CO2 26 21 - 32 mmol/L    Anion gap 9 3.0 - 18 mmol/L    Glucose 113 (H) 74 - 99 mg/dL    BUN 20 (H) 7.0 - 18 MG/DL    Creatinine 1.15 0.6 - 1.3 MG/DL    BUN/Creatinine ratio 17 12 - 20      GFR est AA >60 >60 ml/min/1.73m2    GFR est non-AA >60 >60 ml/min/1.73m2    Calcium 9.3 8.5 - 10.1 MG/DL    Bilirubin, total 0.4 0.2 - 1.0 MG/DL    ALT (SGPT) 16 16 - 61 U/L    AST (SGOT) 14 10 - 38 U/L    Alk. phosphatase 83 45 - 117 U/L    Protein, total 7.6 6.4 - 8.2 g/dL    Albumin 3.9 3.4 - 5.0 g/dL    Globulin 3.7 2.0 - 4.0 g/dL    A-G Ratio 1.1 0.8 - 1.7     LIPID PANEL   Result Value Ref Range    LIPID PROFILE          Cholesterol, total 185 <200 MG/DL    Triglyceride 325 (H) <150 MG/DL    HDL Cholesterol 34 (L) 40 - 60 MG/DL    LDL, calculated 86 0 - 100 MG/DL    VLDL, calculated 65 MG/DL    CHOL/HDL Ratio 5.4 (H) 0 - 5.0             Assessment / Plan     Diabetes well controlled, on Glucotrol XL 5 mg. Hyperlipidemia poorly controlled, with patient's significant history of tobacco use as well as diabetes he would benefit from being on a statin but patient is reluctant to add more medications. ICD-10-CM ICD-9-CM    1. Type 2 diabetes mellitus with diabetic neuropathy, without long-term current use of insulin (HCC)  E11.40 250.60 HEMOGLOBIN A1C W/O EAG     357.2    2. Dyslipidemia  E78.5 272.4 LIPID PANEL   3. Essential hypertension  Z47 413.5 METABOLIC PANEL, COMPREHENSIVE   4. Numbness in both hands  R20.0 782.0  will refer to neurology when patient is ready   5.  Peripheral polyneuropathy G62.9 356.9  will refer to neurology when patient is ready              Diabetic issues reviewed with him: diabetic diet discussed in detail, and low cholesterol diet, weight control and daily exercise discussed. Follow-up and Dispositions    · Return in about 6 months (around 2/13/2022) for labs 1 week before. Reviewed plan of care. Patient has provided input and agrees with goals. never intubated

## 2021-08-30 NOTE — ED PROVIDER NOTE - CADM POA URETHRAL CATHETER
simvastatin (ZOCOR) 10 MG tablet 90 tablet 3 9/9/2020 9/5/2021    Sig - Route: Take 1 tablet by mouth nightly. - Oral      · Refill requested by: Pharmacy Interface  · Last office visit for cholesterol: 8/2/21  · Next office visit: 8/8/22    Date of last lipid:    Lab Results   Component Value Date    CHOLESTEROL 186 04/29/2021    HDL 70 04/29/2021    CALCLDL 74 04/29/2021    TRIGLYCERIDE 212 (H) 04/29/2021   ·   Refill if seen within the last 12 months  Filled per protocol       No

## 2022-03-09 NOTE — ED ADULT TRIAGE NOTE - CHIEF COMPLAINT QUOTE
Pt is from [facility] who was sent here for a fall today. p[t verbalized her left leg is numb for 5 days now and she has pain on the same leg"
Arm band on/IV intact

## 2022-03-16 NOTE — ED ADULT TRIAGE NOTE - NS ED NURSE DIRECT TO ROOM YN
ED Provider Note    Chief Complaint:   Headache    HPI:  Lily Nicole is a very pleasant 25-year-old female with past medical history of ESRD on HD Monday Wednesday Friday, SLE, chronic headaches who presents with slowly worsening posterior headache which radiates to the left neck and is moderate to severe intensity but is not the worst headache of her life or described as thunderclap.  Patient denies vision changes, unilateral weakness or numbness, facial droop, slurred speech.  Patient does endorse some mild nausea.    Review of Systems:  Review of Systems   Constitutional: Negative for chills and fever.   HENT: Negative for congestion and sore throat.    Eyes: Negative for blurred vision and double vision.   Respiratory: Negative for cough and shortness of breath.    Cardiovascular: Negative for chest pain and leg swelling.   Gastrointestinal: Positive for nausea. Negative for abdominal pain and vomiting.   Genitourinary: Negative for dysuria and hematuria.   Musculoskeletal: Negative for falls and neck pain.   Skin: Negative for itching and rash.   Neurological: Positive for headaches. Negative for loss of consciousness.       Past Medical History:   has a past medical history of Anemia (01/17/2018), AVF (arteriovenous fistula) ([facility]), Chest pain, Chest tightness, Daytime sleepiness, Dialysis patient ([facility]), Difficulty breathing, ESRD (end stage renal disease) on dialysis ([facility]) (01/17/2018), Gasping for breath, Heart burn, Hypertension (01/17/2018), Indigestion, Lupus ([facility]), Migraines (01/17/2018), Painful breathing, Palpitations, Seizure ([facility]) (2013), Shortness of breath, Swelling of lower extremity, and Wheezing.    Social History:  Social History     Tobacco Use   • Smoking status: Never Smoker   • Smokeless tobacco: Never Used   Vaping Use   • Vaping Use: Never used   Substance and Sexual Activity   • Alcohol use: No   • Drug use: No   • Sexual activity: Not on file       Surgical History:   has a past  "surgical history that includes roank by laparoscopy (4/5/2010); av fistula creation (Right); angioplasty (01/17/2018); other; other; gastroscopy-endo (12/9/2019); gastroscopy (N/A, 5/30/2020); gastroscopy-endo (9/18/2020); upper gi endoscopy,ctrl bleed (11/12/2020); upper gi endoscopy,biopsy (11/12/2020); gastroscopy-endo (11/12/2020); colonoscopy,diagnostic (1/8/2021); upper gi endoscopy,diagnosis (1/8/2021); upper gi endoscopy,ctrl bleed (1/8/2021); upper gi endoscopy,diagnosis (3/5/2021); upper gi endoscopy,diagnosis (N/A, 3/19/2021); upper gi endoscopy,ctrl bleed (3/19/2021); and bronchoscopy,diagnostic (Bilateral, 5/13/2021).    Allergies:  Allergies   Allergen Reactions   • Cephalexin Rash     Nausea and rash   Hives  .   • Clindamycin Rash     Nausea and rash     Hive  .   • Methylprednisolone      Anxious  Other reaction(s): Other-Reaction in Comments  Anxious   • Metoprolol Rash and Nausea     Nausea and rash     .   • Compazine      C/o anxiety   • Fentanyl And Related Anxiety   • Hydrocodone-Acetaminophen Rash and Nausea     Generalized rash  Generalized rash   • Maxipime [Cefepime] Itching   • Reglan [Metoclopramide] Anxiety   • Tape Rash     Paper tape is ok       Physical Exam:  Vital Signs: BP (!) 179/121   Pulse 72   Temp 36.1 °C (97 °F) (Oral)   Resp 12   Ht 1.651 m (5' 5\")   Wt 53.4 kg (117 lb 11.6 oz)   LMP 02/18/2022   SpO2 96%   BMI 19.59 kg/m²   Physical Exam  Vitals and nursing note reviewed.   Constitutional:       Comments: Patient is lying in bed supine, pleasant, conversant, speaking in complete sentences   HENT:      Head: Normocephalic and atraumatic.   Eyes:      Extraocular Movements: Extraocular movements intact.      Conjunctiva/sclera: Conjunctivae normal.      Pupils: Pupils are equal, round, and reactive to light.   Neck:      Comments: Mild left sided neck TTP  Cardiovascular:      Pulses: Normal pulses.      Comments: HR 72  Pulmonary:      Effort: Pulmonary effort is " normal. No respiratory distress.   Musculoskeletal:         General: No swelling. Normal range of motion.      Cervical back: Normal range of motion. No rigidity.      Comments: Right upper extremity fistula has palpable thrill   Skin:     General: Skin is warm and dry.      Capillary Refill: Capillary refill takes less than 2 seconds.   Neurological:      Mental Status: She is alert.      Comments: Patient moving all extremities         Medical records reviewed for continuity of care.     Results for orders placed or performed during the hospital encounter of 03/16/22   HCG QUAL SERUM   Result Value Ref Range    Beta-Hcg Qualitative Serum Negative Negative       Radiology:  No orders to display        MDM:  hCG to evaluate her pregnancy.  Intracranial hemorrhage, subarachnoid hemorrhage are inconsistent with patient presentation at this time patient is well-appearing, she is alert and oriented, no neurologic deficits identified.  Morphine, Zofran, Ativan for symptom control.  Disposition pending symptom control.    Electronically signed by: Nabeel Og M.D., 3/16/2022, 1:08 AM    Patient reports that the pain has improved and she is not feeling nauseous anymore. Repeat physical exam benign.  I doubt any serious emergency process at this time.  Patient and/or family, friends given strict return precautions and care instructions. They have demonstrated understanding of discharge instructions through teach back mechanism. Advised PCP follow-up in 1-2 days.  Patient/family/friend expresses understanding and agrees to plan.    This dictation has been created using voice recognition software. I am continuously working with the software to minimize the number of voice recognition errors and I have made every attempt to manually correct the errors within my dictation. However errors  related to this voice recognition software may still exist and should be interpreted within the appropriate context.      Electronically signed by: Nabeel Og M.D., 3/16/2022 2:28 AM      Disposition:  Home    Final Impression:  1. Other migraine without status migrainosus, not intractable    2. Strain of neck muscle, initial encounter          No

## 2022-07-28 NOTE — PATIENT PROFILE ADULT - LIVING ENVIRONMENT
Attempt to reach for pre procedure instructions   VM full on home number and unable to reach at work  number no

## 2022-12-12 NOTE — ED PROVIDER NOTE - CROS ED ENDOCRINE ALL NEG
acetaminophen 325 mg oral tablet: 2 tab(s) orally every 6 hours, As needed, Mild Pain  alfuzosin 10 mg oral tablet, extended release: 1 tab(s) orally once a day  aluminum hydroxide-magnesium hydroxide 200 mg-200 mg/5 mL oral suspension: 30 milliliter(s) orally every 4 hours, As needed, Dyspepsia  cholecalciferol: 4000 mcg by mouth daily  cyanocobalamin 1000 mcg/mL injectable solution: inject into the muscle every 30 days   ferrous sulfate 324 mg (65 mg elemental iron) oral tablet:   glipiZIDE 10 mg oral tablet: 1 tab(s) orally once a day  melatonin 3 mg oral tablet: 1 tab(s) orally once a day (at bedtime), As needed, Insomnia  metoclopramide 5 mg/mL injectable solution:  injectable   multivitamin: 1 tab(s) orally once a day  ondansetron 2 mg/mL injectable solution:  injectable   pantoprazole 40 mg intravenous injection: 40 milligram(s) intravenous 2 times a day  prochlorperazine 5 mg/mL injectable solution:  injectable    acetaminophen 325 mg oral tablet: 2 tab(s) orally every 6 hours, As needed, Mild Pain  alfuzosin 10 mg oral tablet, extended release: 1 tab(s) orally once a day  cholecalciferol: 4000 mcg by mouth daily  cyanocobalamin 1000 mcg/mL injectable solution: inject into the muscle every 30 days   ferrous sulfate 324 mg (65 mg elemental iron) oral tablet:   glipiZIDE 10 mg oral tablet: 1 tab(s) orally once a day  multivitamin: 1 tab(s) orally once a day   negative...

## 2023-01-27 NOTE — DISCHARGE NOTE PROVIDER - EXTENDED VTE YES NO FOR MLM ENOXAPARIN
Outpatient Care Management  Plan of Care Follow Up Visit    Patient: Christy Webber  MRN: [id number]  Date of Service: 01/26/2023  Completed by: Zoila Machado RN  Referral Date: 12/21/2022    Reason for Visit   Patient presents with    OPCM Chart Review    OPCM RN First Follow-Up Attempt    Nursing Assessment    Update Plan Of Care     01/27/23       Brief Summary: OPCM visit completed, care plan reviewed and updated.  Next Steps: Plan to follow up with daughter, Megan, in approximately one week.  VASYL Machado RN    
,

## 2023-03-28 NOTE — OCCUPATIONAL THERAPY INITIAL EVALUATION ADULT - PHYSICAL ASSIST/NONPHYSICAL ASSIST: STAND/SIT, REHAB EVAL
1 person assist/verbal cues
Persistent RV dilation and previous history of syncope/bradycardia w/ exercise. Minimal symptoms, but avoiding physical activity. Plan for RHC to r/o PH as etiology for RV dilation. May be normal variant. All questions answered. VSS, physical exam as above. Proceed w/ RHC.

## 2024-01-03 NOTE — OCCUPATIONAL THERAPY INITIAL EVALUATION ADULT - PRECAUTIONS/LIMITATIONS, REHAB EVAL
aspiration precautions/fall precautions/seizure precautions/At [facility] ED, pt presents with R nasolabial flattening only, NIHSS 1. VSS, labs grossly WNL. [Neck] : neck [9] : 9 [6] : 6 [Radiating] : radiating [Sharp] : sharp [Shooting] : shooting [Stabbing] : stabbing [Throbbing] : throbbing [Constant] : constant [Sleep] : sleep [Meds] : meds [Lying in bed] : lying in bed [FreeTextEntry1] : pt is following up for c spine pain , going into the shoulder into the left side, arm  [] : no [FreeTextEntry7] : left shoulder pain
